# Patient Record
Sex: FEMALE | Race: WHITE | Employment: UNEMPLOYED | URBAN - NONMETROPOLITAN AREA
[De-identification: names, ages, dates, MRNs, and addresses within clinical notes are randomized per-mention and may not be internally consistent; named-entity substitution may affect disease eponyms.]

---

## 2018-04-07 ENCOUNTER — APPOINTMENT (OUTPATIENT)
Dept: GENERAL RADIOLOGY | Age: 28
End: 2018-04-07
Payer: MEDICAID

## 2018-04-07 ENCOUNTER — HOSPITAL ENCOUNTER (EMERGENCY)
Age: 28
Discharge: HOME | End: 2018-04-07
Payer: MEDICAID

## 2018-04-07 VITALS
OXYGEN SATURATION: 98 % | RESPIRATION RATE: 18 BRPM | HEIGHT: 68 IN | HEART RATE: 93 BPM | DIASTOLIC BLOOD PRESSURE: 100 MMHG | BODY MASS INDEX: 44.41 KG/M2 | TEMPERATURE: 98.4 F | WEIGHT: 293 LBS | SYSTOLIC BLOOD PRESSURE: 150 MMHG

## 2018-04-07 DIAGNOSIS — S83.91XA SPRAIN OF RIGHT KNEE, UNSPECIFIED LIGAMENT, INITIAL ENCOUNTER: Primary | ICD-10-CM

## 2018-04-07 PROCEDURE — 99283 EMERGENCY DEPT VISIT LOW MDM: CPT

## 2018-04-07 PROCEDURE — 73562 X-RAY EXAM OF KNEE 3: CPT

## 2018-04-07 PROCEDURE — 99283 EMERGENCY DEPT VISIT LOW MDM: CPT | Performed by: NURSE PRACTITIONER

## 2018-04-07 RX ORDER — NAPROXEN 500 MG/1
500 TABLET ORAL 2 TIMES DAILY WITH MEALS
Qty: 60 TABLET | Refills: 0 | Status: SHIPPED | OUTPATIENT
Start: 2018-04-07

## 2018-04-07 ASSESSMENT — PAIN DESCRIPTION - LOCATION: LOCATION: KNEE

## 2018-04-07 ASSESSMENT — PAIN SCALES - GENERAL: PAINLEVEL_OUTOF10: 5

## 2018-04-07 ASSESSMENT — PAIN DESCRIPTION - ORIENTATION: ORIENTATION: RIGHT

## 2018-05-02 ENCOUNTER — OFFICE VISIT (OUTPATIENT)
Dept: BARIATRICS/WEIGHT MGMT | Facility: HOSPITAL | Age: 28
End: 2018-05-02

## 2018-05-02 ENCOUNTER — OFFICE VISIT (OUTPATIENT)
Dept: BARIATRICS/WEIGHT MGMT | Facility: CLINIC | Age: 28
End: 2018-05-02

## 2018-05-02 VITALS
WEIGHT: 293 LBS | BODY MASS INDEX: 45.99 KG/M2 | TEMPERATURE: 96.8 F | HEART RATE: 95 BPM | SYSTOLIC BLOOD PRESSURE: 156 MMHG | DIASTOLIC BLOOD PRESSURE: 100 MMHG | OXYGEN SATURATION: 98 % | HEIGHT: 67 IN

## 2018-05-02 DIAGNOSIS — R06.2 WHEEZING: ICD-10-CM

## 2018-05-02 DIAGNOSIS — R53.83 FATIGUE, UNSPECIFIED TYPE: ICD-10-CM

## 2018-05-02 DIAGNOSIS — F17.200 SMOKER: ICD-10-CM

## 2018-05-02 DIAGNOSIS — Z13.220 SCREENING FOR LIPID DISORDERS: ICD-10-CM

## 2018-05-02 DIAGNOSIS — R63.5 WEIGHT GAIN: ICD-10-CM

## 2018-05-02 DIAGNOSIS — Z76.89 ESTABLISHING CARE WITH NEW DOCTOR, ENCOUNTER FOR: ICD-10-CM

## 2018-05-02 DIAGNOSIS — E66.01 OBESITY, MORBID, BMI 50 OR HIGHER (HCC): Primary | ICD-10-CM

## 2018-05-02 DIAGNOSIS — R03.0 BLOOD PRESSURE ELEVATED WITHOUT HISTORY OF HTN: ICD-10-CM

## 2018-05-02 PROCEDURE — 97802 MEDICAL NUTRITION INDIV IN: CPT

## 2018-05-02 PROCEDURE — 99204 OFFICE O/P NEW MOD 45 MIN: CPT | Performed by: NURSE PRACTITIONER

## 2018-05-02 RX ORDER — ALBUTEROL SULFATE 90 UG/1
2 AEROSOL, METERED RESPIRATORY (INHALATION) EVERY 4 HOURS PRN
Qty: 1 INHALER | Refills: 0 | Status: SHIPPED | OUTPATIENT
Start: 2018-05-02 | End: 2018-07-13 | Stop reason: SDUPTHER

## 2018-05-02 RX ORDER — NAPROXEN 500 MG/1
500 TABLET ORAL
COMMUNITY
Start: 2018-04-07 | End: 2018-05-02

## 2018-05-02 RX ORDER — RANITIDINE HCL 75 MG
75 TABLET ORAL 2 TIMES DAILY
COMMUNITY
End: 2018-05-14 | Stop reason: SDUPTHER

## 2018-05-02 NOTE — PATIENT INSTRUCTIONS
Magda Palm is a 27 y.o. who has a history of morbid obesity and desires medically supervised weight loss. Baseline labs will be ordered today. Patient may be interested in some of the weight loss medications (prescription drug management) at the next visit such as: metformin, topamax, wellbutrin.  Patient will see the dietician today for make specific goals for diet, exercise, and lifestyle. Patient has received intensive behavioral therapy for obesity today. A referral has been made today to get established with Dr. Falcon as new PCP. Office will arrange. You may use the inhaler as needed and directed for wheezing. Smoking cessation has been discussed and encouraged to improve overall health and well being. Patient may want bariatric surgery in the future, but would like to try medically weight loss first.I will have them follow up in one month for a weight recheck and to further discuss options.

## 2018-05-02 NOTE — PROGRESS NOTES
"Subjective   Magda Palm is a 27 y.o. female.     History of Present Illness   Magda Palm is a 27 y.o. year-old who has morbid obesity and is interested in having medically supervised weight loss versus surgical weight loss. Patient has been overweight for the past 5 years. The most weight ever lost was 5 pounds from using low sugar diet and walking. She has been able to keep the weight off for about 2 months. Unsupervised diet attempts include: low fat, fasting, calorie counting, and low carb diet. Supervised diet attempts include: none.  Patient has tried the following OTC or prescription medications for weight loss: garcinia cambogia. Patient states she tends to eat due to physical hunger, anxiety, and loneliness. Patient is limited in activities due to: knee pain.   Vitals:    05/02/18 1337   BP: 156/100   BP Location: Right arm   Patient Position: Sitting   Cuff Size: Adult   Pulse: 95   Temp: 96.8 °F (36 °C)   SpO2: 98%   Weight: (!) 153 kg (336 lb 9.6 oz)   Height: 168.9 cm (66.5\")     She  has a past medical history of ADD (attention deficit disorder); Anxiety; Constipation; Diarrhea; Heart burn; Insomnia; and Pain.  She  does not have a problem list on file.  She  has a past surgical history that includes Cholecystectomy and Floyd tooth extraction.  Her family history includes Heart disease in her maternal grandmother; No Known Problems in her father and mother.  She  reports that she has been smoking.  She started smoking about 16 years ago. She has a 10.00 pack-year smoking history. She has never used smokeless tobacco. She reports that she drinks alcohol. She reports that she does not use drugs.  Current Outpatient Prescriptions   Medication Sig Dispense Refill   • raNITIdine (ZANTAC) 75 MG tablet Take 75 mg by mouth 2 (Two) Times a Day.     • albuterol (PROVENTIL HFA;VENTOLIN HFA) 108 (90 Base) MCG/ACT inhaler Inhale 2 puffs Every 4 (Four) Hours As Needed for Wheezing. 1 inhaler 0     No " current facility-administered medications for this visit.      She is allergic to penicillins.      The following portions of the patient's history were reviewed and updated as appropriate: allergies, current medications, past family history, past medical history, past social history, past surgical history and problem list.    Review of Systems   Constitutional: Positive for fatigue. Negative for activity change and appetite change.   HENT: Positive for postnasal drip.    Eyes: Negative.         Wears corrective lenses   Respiratory: Positive for cough. Negative for apnea, chest tightness and shortness of breath.    Cardiovascular: Negative.  Negative for chest pain, palpitations and leg swelling.   Gastrointestinal: Positive for constipation and diarrhea. Negative for abdominal pain, anal bleeding, nausea and vomiting.        Heartburn   Endocrine: Negative.         Night sweats; abnormal facial hair   Genitourinary: Positive for menstrual problem.   Musculoskeletal: Positive for arthralgias and back pain.   Skin: Negative.    Allergic/Immunologic: Negative.    Neurological: Negative.  Negative for seizures and syncope.   Hematological: Negative.  Does not bruise/bleed easily.   Psychiatric/Behavioral: Positive for sleep disturbance. Negative for dysphoric mood and self-injury.       Objective   Physical Exam   Constitutional: She is oriented to person, place, and time. Vital signs are normal. She appears well-developed and well-nourished. She is cooperative. No distress.   HENT:   Head: Normocephalic and atraumatic.   Nose: Nose normal.   Mouth/Throat: Oropharynx is clear and moist. No oropharyngeal exudate or tonsillar abscesses.   Eyes: Conjunctivae, EOM and lids are normal. Pupils are equal, round, and reactive to light. Right eye exhibits no discharge. Left eye exhibits no discharge.   Neck: Trachea normal. Neck supple. No JVD present. Carotid bruit is not present. No no neck rigidity. No tracheal deviation  present. No thyromegaly present.   Cardiovascular: Normal rate, regular rhythm, S1 normal, S2 normal and normal heart sounds.    Pulmonary/Chest: Effort normal. No stridor. No respiratory distress. She has wheezes in the right upper field, the right middle field, the right lower field, the left upper field, the left middle field and the left lower field. She has no rales.   Abdominal: Soft. Bowel sounds are normal. She exhibits no distension. There is no tenderness.   obese   Musculoskeletal: She exhibits no edema.        Right shoulder: She exhibits normal strength.   Lymphadenopathy:     She has no cervical adenopathy.   Neurological: She is alert and oriented to person, place, and time. She has normal strength. No cranial nerve deficit.   Skin: Skin is warm, dry and intact. No rash noted.   Psychiatric: She has a normal mood and affect. Her speech is normal and behavior is normal.   Alert and oriented x 3   Vitals reviewed.      Assessment/Plan   Magda was seen today for consult, obesity, weight loss and nutrition counseling.    Diagnoses and all orders for this visit:    Obesity, morbid, BMI 50 or higher  -     Bariatric Nutritional Counseling; Standing  -     CBC (No Diff); Future  -     Comprehensive Metabolic Panel; Future  -     Lipid Panel; Future  -     TSH; Future  -     Vitamin B12; Future  -     Vitamin D 25 Hydroxy; Future  -     Cancel: Ambulatory Referral to Internal Medicine  -     Ambulatory Referral to Internal Medicine    Smoker  -     Cancel: Ambulatory Referral to Internal Medicine  -     Ambulatory Referral to Internal Medicine    Blood pressure elevated without history of HTN  -     Cancel: Ambulatory Referral to Internal Medicine  -     Ambulatory Referral to Internal Medicine    Wheezing  -     Cancel: Ambulatory Referral to Internal Medicine  -     Ambulatory Referral to Internal Medicine    Weight gain  -     CBC (No Diff); Future  -     Comprehensive Metabolic Panel; Future  -     TSH;  Future  -     Vitamin B12; Future  -     Vitamin D 25 Hydroxy; Future    Fatigue, unspecified type  -     CBC (No Diff); Future  -     Comprehensive Metabolic Panel; Future  -     TSH; Future  -     Vitamin B12; Future  -     Vitamin D 25 Hydroxy; Future    Establishing care with new doctor, encounter for  -     Cancel: Ambulatory Referral to Internal Medicine  -     Ambulatory Referral to Internal Medicine    Screening for lipid disorders  -     Lipid Panel; Future    Other orders  -     albuterol (PROVENTIL HFA;VENTOLIN HFA) 108 (90 Base) MCG/ACT inhaler; Inhale 2 puffs Every 4 (Four) Hours As Needed for Wheezing.          Magda Palm is a 27 y.o. who has a history of morbid obesity and desires medically supervised weight loss. Baseline labs will be ordered today. Patient may be interested in some of the weight loss medications (prescription drug management) at the next visit such as: metformin, topamax, wellbutrin.  Patient will see the dietician today for make specific goals for diet, exercise, and lifestyle. Patient has received intensive behavioral therapy for obesity today. A referral has been made today to get established with Dr. Falcon as new PCP. Office will arrange. You may use the inhaler as needed and directed for wheezing. Smoking cessation has been discussed and encouraged to improve overall health and well being. Patient may want bariatric surgery in the future, but would like to try medically weight loss first.I will have them follow up in one month for a weight recheck and to further discuss options.

## 2018-05-02 NOTE — PROGRESS NOTES
"Nutrition Bariatric/[facility] Note     Visit   Initial Assessment     Anthropometrics   Height: 66.5\"  Weight: 336.5#  BMI: 53.5    Waist: 59\"  Hip: 60\"  Chest: 56\"  Thigh: 31\"  Arm: 19\"  Body Fat: 49.7%    Nutrition Recall  24 Hour recall:  (B) skips meal (L) skips meal (D) salad w/chicken/egg/cheese OR bagel, water  Eating 1 meals daily   Protein lacking at some meals  Snacking: hard boiled eggs  Limited sweet intake.  Decreased during the past 3 months  Large portions  Drinking with meals   Decreased to drinking 1-2 carbonated beverages per week.  Drinking greater to or equal to 64 fluid ounces    Exercise   Walkin minutes daily.  Began during past 3 months.    Habits:  Smoking    Education    Goal Setting and Information Packet    Nutrition Goals   Continue diet changes  Eat 3-4 meals per day with protein  Eat protein first at meals  Eliminate snacks  Healthier food choices  Portion control / Use smaller plate or measuring cup   Eliminate soda    Exercise Goals  Continue current exercise routine     Marcia Castellano RD, LD  2018  1:48 PM    "

## 2018-05-03 ENCOUNTER — TELEPHONE (OUTPATIENT)
Dept: BARIATRICS/WEIGHT MGMT | Facility: CLINIC | Age: 28
End: 2018-05-03

## 2018-05-03 ENCOUNTER — LAB (OUTPATIENT)
Dept: LAB | Facility: HOSPITAL | Age: 28
End: 2018-05-03
Attending: NURSE PRACTITIONER

## 2018-05-03 DIAGNOSIS — E66.01 OBESITY, MORBID, BMI 50 OR HIGHER (HCC): ICD-10-CM

## 2018-05-03 DIAGNOSIS — R53.83 FATIGUE, UNSPECIFIED TYPE: ICD-10-CM

## 2018-05-03 DIAGNOSIS — Z13.220 SCREENING FOR LIPID DISORDERS: ICD-10-CM

## 2018-05-03 DIAGNOSIS — R63.5 WEIGHT GAIN: ICD-10-CM

## 2018-05-03 LAB
25(OH)D3 SERPL-MCNC: 19.2 NG/ML (ref 30–100)
ALBUMIN SERPL-MCNC: 3.8 G/DL (ref 3.5–5)
ALBUMIN/GLOB SERPL: 1.2 G/DL (ref 1.1–2.5)
ALP SERPL-CCNC: 60 U/L (ref 24–120)
ALT SERPL W P-5'-P-CCNC: 36 U/L (ref 0–54)
ANION GAP SERPL CALCULATED.3IONS-SCNC: 9 MMOL/L (ref 4–13)
ARTICHOKE IGE QN: 138 MG/DL (ref 0–99)
AST SERPL-CCNC: 28 U/L (ref 7–45)
BILIRUB SERPL-MCNC: 0.5 MG/DL (ref 0.1–1)
BUN BLD-MCNC: 14 MG/DL (ref 5–21)
BUN/CREAT SERPL: 20 (ref 7–25)
CALCIUM SPEC-SCNC: 9.1 MG/DL (ref 8.4–10.4)
CHLORIDE SERPL-SCNC: 109 MMOL/L (ref 98–110)
CHOLEST SERPL-MCNC: 200 MG/DL (ref 130–200)
CO2 SERPL-SCNC: 25 MMOL/L (ref 24–31)
CREAT BLD-MCNC: 0.7 MG/DL (ref 0.5–1.4)
DEPRECATED RDW RBC AUTO: 38.4 FL (ref 40–54)
ERYTHROCYTE [DISTWIDTH] IN BLOOD BY AUTOMATED COUNT: 12.7 % (ref 12–15)
GFR SERPL CREATININE-BSD FRML MDRD: 100 ML/MIN/1.73
GLOBULIN UR ELPH-MCNC: 3.1 GM/DL
GLUCOSE BLD-MCNC: 98 MG/DL (ref 70–100)
HCT VFR BLD AUTO: 43.4 % (ref 37–47)
HDLC SERPL-MCNC: 29 MG/DL
HGB BLD-MCNC: 14.6 G/DL (ref 12–16)
LDLC/HDLC SERPL: 4.01 {RATIO}
MCH RBC QN AUTO: 27.8 PG (ref 28–32)
MCHC RBC AUTO-ENTMCNC: 33.6 G/DL (ref 33–36)
MCV RBC AUTO: 82.5 FL (ref 82–98)
PLATELET # BLD AUTO: 373 10*3/MM3 (ref 130–400)
PMV BLD AUTO: 9.8 FL (ref 6–12)
POTASSIUM BLD-SCNC: 4 MMOL/L (ref 3.5–5.3)
PROT SERPL-MCNC: 6.9 G/DL (ref 6.3–8.7)
RBC # BLD AUTO: 5.26 10*6/MM3 (ref 4.2–5.4)
SODIUM BLD-SCNC: 143 MMOL/L (ref 135–145)
TRIGL SERPL-MCNC: 274 MG/DL (ref 0–149)
TSH SERPL DL<=0.05 MIU/L-ACNC: 6.01 MIU/ML (ref 0.47–4.68)
VIT B12 BLD-MCNC: 471 PG/ML (ref 239–931)
WBC NRBC COR # BLD: 9.57 10*3/MM3 (ref 4.8–10.8)

## 2018-05-03 PROCEDURE — 82306 VITAMIN D 25 HYDROXY: CPT | Performed by: NURSE PRACTITIONER

## 2018-05-03 PROCEDURE — 36415 COLL VENOUS BLD VENIPUNCTURE: CPT

## 2018-05-03 PROCEDURE — 80061 LIPID PANEL: CPT | Performed by: NURSE PRACTITIONER

## 2018-05-03 PROCEDURE — 82607 VITAMIN B-12: CPT | Performed by: NURSE PRACTITIONER

## 2018-05-03 PROCEDURE — 80050 GENERAL HEALTH PANEL: CPT | Performed by: NURSE PRACTITIONER

## 2018-05-03 RX ORDER — ERGOCALCIFEROL 1.25 MG/1
50000 CAPSULE ORAL WEEKLY
Qty: 4 CAPSULE | Refills: 2 | Status: SHIPPED | OUTPATIENT
Start: 2018-05-03 | End: 2018-08-27 | Stop reason: ALTCHOICE

## 2018-05-03 NOTE — TELEPHONE ENCOUNTER
TSH is slightly elevated.  We will have patient follow up with Dr. Falcon or his nurse practitioner at her first visit with them.  They might want to just recheck her level in about 6 weeks.  Vitamin D level is low.  We will send in prescription for vitamin D to be taken 50,000 units once a week.  On the other 6 days of the week she can take vitamin D3 2000 units daily. She can purchase that over the counter. We will recheck level in 3 months. Triglycerides are elevated. Diet, exercise, and weight loss will help improve this number. Specifically, reducing starches, sweets, and alcohol (if applicable) in her diet. Attempted to call patient, but no answer and no option to leave voice message. Please, send her a letter.

## 2018-05-14 ENCOUNTER — OFFICE VISIT (OUTPATIENT)
Dept: INTERNAL MEDICINE | Facility: CLINIC | Age: 28
End: 2018-05-14

## 2018-05-14 VITALS
OXYGEN SATURATION: 96 % | TEMPERATURE: 98.1 F | SYSTOLIC BLOOD PRESSURE: 164 MMHG | HEIGHT: 67 IN | RESPIRATION RATE: 16 BRPM | HEART RATE: 79 BPM | DIASTOLIC BLOOD PRESSURE: 108 MMHG | BODY MASS INDEX: 45.99 KG/M2 | WEIGHT: 293 LBS

## 2018-05-14 DIAGNOSIS — N92.6 IRREGULAR MENSTRUAL BLEEDING: ICD-10-CM

## 2018-05-14 DIAGNOSIS — Z23 NEED FOR TETANUS BOOSTER: ICD-10-CM

## 2018-05-14 DIAGNOSIS — K21.9 GASTROESOPHAGEAL REFLUX DISEASE WITHOUT ESOPHAGITIS: ICD-10-CM

## 2018-05-14 DIAGNOSIS — E03.8 OTHER SPECIFIED HYPOTHYROIDISM: ICD-10-CM

## 2018-05-14 DIAGNOSIS — I10 ESSENTIAL HYPERTENSION: Primary | ICD-10-CM

## 2018-05-14 DIAGNOSIS — IMO0001 CLASS 3 OBESITY DUE TO EXCESS CALORIES WITHOUT SERIOUS COMORBIDITY WITH BODY MASS INDEX (BMI) OF 50.0 TO 59.9 IN ADULT: ICD-10-CM

## 2018-05-14 DIAGNOSIS — Z71.6 ENCOUNTER FOR SMOKING CESSATION COUNSELING: ICD-10-CM

## 2018-05-14 DIAGNOSIS — F33.41 RECURRENT MAJOR DEPRESSIVE DISORDER, IN PARTIAL REMISSION (HCC): ICD-10-CM

## 2018-05-14 DIAGNOSIS — F17.210 CIGARETTE SMOKER: ICD-10-CM

## 2018-05-14 PROCEDURE — 99215 OFFICE O/P EST HI 40 MIN: CPT | Performed by: NURSE PRACTITIONER

## 2018-05-14 PROCEDURE — 90471 IMMUNIZATION ADMIN: CPT | Performed by: NURSE PRACTITIONER

## 2018-05-14 PROCEDURE — 90715 TDAP VACCINE 7 YRS/> IM: CPT | Performed by: NURSE PRACTITIONER

## 2018-05-14 PROCEDURE — 99406 BEHAV CHNG SMOKING 3-10 MIN: CPT | Performed by: NURSE PRACTITIONER

## 2018-05-14 RX ORDER — LEVOTHYROXINE SODIUM 0.2 MG/1
200 TABLET ORAL DAILY
Qty: 30 TABLET | Refills: 6 | Status: SHIPPED | OUTPATIENT
Start: 2018-05-14 | End: 2019-03-28 | Stop reason: SDUPTHER

## 2018-05-14 RX ORDER — RANITIDINE HCL 75 MG
75 TABLET ORAL NIGHTLY
Qty: 30 TABLET | Refills: 6 | Status: SHIPPED | OUTPATIENT
Start: 2018-05-14 | End: 2018-10-29

## 2018-05-14 RX ORDER — BUPROPION HYDROCHLORIDE 75 MG/1
75 TABLET ORAL 2 TIMES DAILY
Qty: 60 TABLET | Refills: 6 | Status: SHIPPED | OUTPATIENT
Start: 2018-05-14 | End: 2018-06-14

## 2018-05-14 RX ORDER — LISINOPRIL 10 MG/1
20 TABLET ORAL DAILY
Qty: 30 TABLET | Refills: 6 | Status: SHIPPED | OUTPATIENT
Start: 2018-05-14 | End: 2018-05-15 | Stop reason: SDUPTHER

## 2018-05-14 RX ORDER — PANTOPRAZOLE SODIUM 40 MG/1
40 TABLET, DELAYED RELEASE ORAL DAILY
Qty: 30 TABLET | Refills: 6 | Status: SHIPPED | OUTPATIENT
Start: 2018-05-14 | End: 2019-01-03 | Stop reason: SDUPTHER

## 2018-05-14 NOTE — PROGRESS NOTES
CC: follow up on thyroid, elevated blood pressure, establish care with PCP.    History:  Magda Palm is a 27 y.o. female who presents today for evaluation of the above problems.    She presents today to establish care with primary care physician.  She is seeing bariatric surgery for weight loss.  She did have labs done by their office and was found to have an elevated TSH level.  She was also found to have elevated blood pressure at that visit.  She has elevated blood pressure level today as well.  She reports that she has never taken blood pressure medicine in the past.  Patient also complains of worsening heartburn.  She does take Zantac nightly.  This previously did help heartburn but recently has not been keeping it controlled.  Patient is a current cigarette smoker.  She would like to quit.  She has tried patches and gums in the past with no success.  Never tried Wellbutrin before for smoking cessation.  Patient does have a history of mood disorder and depression.  She is not currently being treated for mental health.  She tried Lamictal in the past with no success.  She reports that she does have bipolar tendencies.   Patient does report irregular periods.  At times she will go months between periods.  Patient is due for pap. Declines at this time.     ROS:  Review of Systems   Constitutional: Negative for fatigue and unexpected weight change.   HENT: Negative.         Heartburn   Eyes: Negative.    Respiratory: Negative.    Cardiovascular: Negative.    Gastrointestinal: Positive for diarrhea. Negative for abdominal pain and constipation.   Endocrine: Negative.    Genitourinary: Positive for menstrual problem. Negative for difficulty urinating, dyspareunia, genital sores, pelvic pain, vaginal bleeding, vaginal discharge and vaginal pain.   Musculoskeletal: Negative.    Skin: Negative.    Neurological: Negative.    Psychiatric/Behavioral: Negative.        Allergies   Allergen Reactions   • Penicillins Hives  "    Past Medical History:   Diagnosis Date   • ADD (attention deficit disorder)    • Anxiety    • Constipation    • Depression    • Diarrhea    • Essential hypertension 5/14/2018   • Heart burn    • Insomnia    • Pain     shoulder, knee, back     Past Surgical History:   Procedure Laterality Date   • CHOLECYSTECTOMY      lap   • WISDOM TOOTH EXTRACTION       Family History   Problem Relation Age of Onset   • No Known Problems Mother    • No Known Problems Father    • Heart disease Maternal Grandmother    • Cancer Maternal Grandmother    • Stroke Maternal Grandmother    • Diabetes Maternal Grandmother       reports that she has been smoking.  She started smoking about 16 years ago. She has a 10.00 pack-year smoking history. She has never used smokeless tobacco. She reports that she uses drugs, including Marijuana. She reports that she does not drink alcohol.      Current Outpatient Prescriptions:   •  albuterol (PROVENTIL HFA;VENTOLIN HFA) 108 (90 Base) MCG/ACT inhaler, Inhale 2 puffs Every 4 (Four) Hours As Needed for Wheezing., Disp: 1 inhaler, Rfl: 0  •  Aspirin-Acetaminophen-Caffeine (EXCEDRIN PO), Take 2 tablets by mouth As Needed (for headaches and migraines)., Disp: , Rfl:   •  ergocalciferol (ERGOCALCIFEROL) 80359 units capsule, Take 1 capsule by mouth 1 (One) Time Per Week., Disp: 4 capsule, Rfl: 2  •  raNITIdine (ZANTAC) 75 MG tablet, Take 75 mg by mouth 2 (Two) Times a Day., Disp: , Rfl:     OBJECTIVE:  BP (!) 164/108 (BP Location: Left arm, Patient Position: Sitting, Cuff Size: Adult)   Pulse 79   Temp 98.1 °F (36.7 °C) (Oral)   Resp 16   Ht 168.9 cm (66.5\")   Wt (!) 152 kg (335 lb 6.4 oz)   SpO2 96%   BMI 53.32 kg/m²    Physical Exam   Constitutional: She is oriented to person, place, and time. She appears well-developed and well-nourished.   HENT:   Head: Normocephalic and atraumatic.   Eyes: EOM are normal. Pupils are equal, round, and reactive to light.   Neck: Normal range of motion. Neck " supple.   Cardiovascular: Normal rate, regular rhythm and normal heart sounds.    Pulmonary/Chest: Effort normal and breath sounds normal.   Abdominal: Soft. Bowel sounds are normal.   Musculoskeletal: Normal range of motion.   Neurological: She is alert and oriented to person, place, and time.   Skin: Skin is warm and dry.   Psychiatric: She has a normal mood and affect.   Vitals reviewed.      Assessment/Plan    Diagnoses and all orders for this visit:    Essential hypertension  -     lisinopril (PRINIVIL,ZESTRIL) 10 MG tablet; Take 2 tablets by mouth Daily.  Patient to keep a blood pressure log.  Goal is a blood pressure below 140/90.  We will follow-up for blood pressure check in one month.    Class 3 obesity due to excess calories without serious comorbidity with body mass index (BMI) of 50.0 to 59.9 in adult  Continue appointments with bariatric surgery.  We will start Wellbutrin for depression and smoking cessation this may also assist with make weight loss.    Gastroesophageal reflux disease without esophagitis  -     pantoprazole (PROTONIX) 40 MG EC tablet; Take 1 tablet by mouth Daily.  -     raNITIdine (ZANTAC) 75 MG tablet; Take 1 tablet by mouth Every Night.  Education provided for diet changes and discharge summary.    Cigarette smoker  -     pneumococcal polysaccharide 23-valent (PNEUMOVAX-23) vaccine 0.5 mL; Inject 0.5 mL into the shoulder, thigh, or buttocks 1 (One) Time.    Other specified hypothyroidism  -     levothyroxine (SYNTHROID) 200 MCG tablet; Take 1 tablet by mouth Daily.  -     TSH; Future  -     T4, free; Future  -     T3, free; Future  We will recheck thyroid labs in 6 weeks.    Recurrent major depressive disorder, in partial remission  -     buPROPion (WELLBUTRIN) 75 MG tablet; Take 1 tablet by mouth 2 (Two) Times a Day.    Irregular menstrual bleeding  Advised that thyroid disorder can also cause menstrual irregularity.  As well as being overweight.    Need for tetanus booster  -      Tdap Vaccine Greater Than or Equal To 6yo IM    Encounter for smoking cessation counseling  -     buPROPion (WELLBUTRIN) 75 MG tablet; Take 1 tablet by mouth 2 (Two) Times a Day.  I advised the patient of the risks in continuing to use tobacco, and I provided this patient with smoking cessation educational materials.    During this visit, I spent 8 minutes counseling the patient regarding smoking cessation.      An After Visit Summary was printed and given to the patient at discharge.  Return in about 1 month (around 6/14/2018) for b/p check.         Vangie Monique APRN 5/14/2018

## 2018-05-15 DIAGNOSIS — I10 ESSENTIAL HYPERTENSION: ICD-10-CM

## 2018-05-15 RX ORDER — LISINOPRIL 20 MG/1
20 TABLET ORAL DAILY
Qty: 30 TABLET | Refills: 5 | Status: SHIPPED | OUTPATIENT
Start: 2018-05-15 | End: 2018-06-14 | Stop reason: SINTOL

## 2018-05-15 RX ORDER — LISINOPRIL 20 MG/1
20 TABLET ORAL DAILY
Qty: 30 TABLET | Refills: 5 | Status: CANCELLED | OUTPATIENT
Start: 2018-05-15

## 2018-05-25 ENCOUNTER — RESULTS ENCOUNTER (OUTPATIENT)
Dept: BARIATRICS/WEIGHT MGMT | Facility: CLINIC | Age: 28
End: 2018-05-25

## 2018-05-25 DIAGNOSIS — E66.01 OBESITY, MORBID, BMI 50 OR HIGHER (HCC): ICD-10-CM

## 2018-06-04 ENCOUNTER — OFFICE VISIT (OUTPATIENT)
Dept: BARIATRICS/WEIGHT MGMT | Facility: CLINIC | Age: 28
End: 2018-06-04

## 2018-06-04 VITALS
HEART RATE: 97 BPM | BODY MASS INDEX: 45.99 KG/M2 | WEIGHT: 293 LBS | DIASTOLIC BLOOD PRESSURE: 85 MMHG | TEMPERATURE: 98.2 F | HEIGHT: 67 IN | SYSTOLIC BLOOD PRESSURE: 125 MMHG | OXYGEN SATURATION: 97 %

## 2018-06-04 DIAGNOSIS — F17.210 CIGARETTE SMOKER: Primary | ICD-10-CM

## 2018-06-04 DIAGNOSIS — K21.9 GASTROESOPHAGEAL REFLUX DISEASE WITHOUT ESOPHAGITIS: ICD-10-CM

## 2018-06-04 PROCEDURE — 99213 OFFICE O/P EST LOW 20 MIN: CPT | Performed by: SURGERY

## 2018-06-04 PROCEDURE — 99406 BEHAV CHNG SMOKING 3-10 MIN: CPT | Performed by: SURGERY

## 2018-06-04 NOTE — PROGRESS NOTES
Patient Care Team:  ANA LILIA Sorto as PCP - General (Family Medicine)    Reason for Visit:  Surgical Weight loss    Subjective     Patient is a 27 y.o. female presents with morbid obesity and her Body mass index is 53.42 kg/m².     She is here for discussion of surgical weight loss options.  She stated she has been with the disease of obesity for year(s).  She stated she suffers from joint pain, heartburn and hypertension due to her weight gain.  She stated that weight loss helps alleviate these symptoms.   She stated that she has tried changing her eating fair and stopping the use of sodas as well as exercise to help with weight loss.  She stated that she has attempted these conservative methods for weight loss without maintaining long term success.  Today she would like to discuss surgical weight loss options such as the Laparoscopic Sleeve Gastrectomy or the Laparoscopic R - Y Gastric Bypass.     Review of Systems  General ROS: positive for  - sleep disturbance  Respiratory ROS: positive for - cough  negative for - hemoptysis, shortness of breath, tachypnea or wheezing  Cardiovascular ROS: no chest pain or dyspnea on exertion  Gastrointestinal ROS: no abdominal pain, change in bowel habits, or black or bloody stools  Musculoskeletal ROS: positive for - joint pain    History  Past Medical History:   Diagnosis Date   • ADD (attention deficit disorder)    • Anxiety    • Constipation    • Depression    • Diarrhea    • Essential hypertension 5/14/2018   • Heart burn    • Insomnia    • Pain     shoulder, knee, back     Past Surgical History:   Procedure Laterality Date   • CHOLECYSTECTOMY      lap   • WISDOM TOOTH EXTRACTION       Family History   Problem Relation Age of Onset   • No Known Problems Mother    • No Known Problems Father    • Heart disease Maternal Grandmother    • Cancer Maternal Grandmother    • Stroke Maternal Grandmother    • Diabetes Maternal Grandmother      Social History   Substance Use  Topics   • Smoking status: Current Every Day Smoker     Packs/day: 0.50     Years: 10.00     Start date: 5/2/2002   • Smokeless tobacco: Never Used      Comment: it is hard, stresses her out   • Alcohol use No       (Not in a hospital admission)  Allergies:  Penicillins      Current Outpatient Prescriptions:   •  albuterol (PROVENTIL HFA;VENTOLIN HFA) 108 (90 Base) MCG/ACT inhaler, Inhale 2 puffs Every 4 (Four) Hours As Needed for Wheezing., Disp: 1 inhaler, Rfl: 0  •  Aspirin-Acetaminophen-Caffeine (EXCEDRIN PO), Take 2 tablets by mouth As Needed (for headaches and migraines)., Disp: , Rfl:   •  buPROPion (WELLBUTRIN) 75 MG tablet, Take 1 tablet by mouth 2 (Two) Times a Day., Disp: 60 tablet, Rfl: 6  •  ergocalciferol (ERGOCALCIFEROL) 32863 units capsule, Take 1 capsule by mouth 1 (One) Time Per Week., Disp: 4 capsule, Rfl: 2  •  levothyroxine (SYNTHROID) 200 MCG tablet, Take 1 tablet by mouth Daily., Disp: 30 tablet, Rfl: 6  •  lisinopril (PRINIVIL,ZESTRIL) 20 MG tablet, Take 1 tablet by mouth Daily., Disp: 30 tablet, Rfl: 5  •  pantoprazole (PROTONIX) 40 MG EC tablet, Take 1 tablet by mouth Daily., Disp: 30 tablet, Rfl: 6  •  raNITIdine (ZANTAC) 75 MG tablet, Take 1 tablet by mouth Every Night. (Patient taking differently: Take 75 mg by mouth As Needed.), Disp: 30 tablet, Rfl: 6    Objective     Vital Signs  Temp:  [98.2 °F (36.8 °C)] 98.2 °F (36.8 °C)  Heart Rate:  [97] 97  BP: (125)/(85) 125/85  Body mass index is 53.42 kg/m².  1    06/04/18  0904   Weight: (!) 152 kg (336 lb)       Physical Exam:     HEENT: extra ocular movement intact   Neurologic: alert, oriented, normal speech, no focal findings or movement disorder noted       Results Review:   None        Assessment/Plan   Encounter Diagnoses   Name Primary?   • Cigarette smoker Yes   • Gastroesophageal reflux disease without esophagitis        A total of 15 minutes was spent face-to-face with this patient during the encounter and over half of that time  was spent on counseling and coordination of care of her disease of obesity.  The patient and I discussed the causes of her obesity and conservative as well as surgical methods to help reverse this comorbid condition.  I encouraged her to continue to eat 3 meals a day and continue to measure her meals.  She is consuming 64 ounces of fluid daily and exercising for 30 minutes twice a week.  I have explained that it is necessary for her consideration for surgery to stop the use of nicotine products and marijuana.  She is attempting to stop smoking and is currently on Wellbutrin to help facilitate this.  She states that the Wellbutrin has not been effective and is following up with her primary care provider.  Of note approximately 5 minutes was spent face-to-face with this patient during this encounter on counseling and coordination of nicotine cessation.  I provided education and recommendations for nicotine cessation.  She is to follow-up with our program in 1 month's time.    I discussed the patients findings and my recommendations with patient.     Dr. Benny Rueda MD [facility]    06/04/18  9:37 AM  Patient Care Team:  ANA LILIA Sorto as PCP - General (Family Medicine)

## 2018-06-14 ENCOUNTER — OFFICE VISIT (OUTPATIENT)
Dept: INTERNAL MEDICINE | Facility: CLINIC | Age: 28
End: 2018-06-14

## 2018-06-14 VITALS
HEIGHT: 67 IN | RESPIRATION RATE: 12 BRPM | WEIGHT: 293 LBS | BODY MASS INDEX: 45.99 KG/M2 | DIASTOLIC BLOOD PRESSURE: 90 MMHG | OXYGEN SATURATION: 98 % | HEART RATE: 88 BPM | TEMPERATURE: 98.2 F | SYSTOLIC BLOOD PRESSURE: 128 MMHG

## 2018-06-14 DIAGNOSIS — F33.1 MODERATE EPISODE OF RECURRENT MAJOR DEPRESSIVE DISORDER (HCC): ICD-10-CM

## 2018-06-14 DIAGNOSIS — Z71.6 ENCOUNTER FOR SMOKING CESSATION COUNSELING: ICD-10-CM

## 2018-06-14 DIAGNOSIS — F17.210 CIGARETTE SMOKER: ICD-10-CM

## 2018-06-14 DIAGNOSIS — I10 ESSENTIAL HYPERTENSION: Primary | ICD-10-CM

## 2018-06-14 DIAGNOSIS — M79.641 RIGHT HAND PAIN: ICD-10-CM

## 2018-06-14 DIAGNOSIS — R20.0 NUMBNESS OF RIGHT HAND: ICD-10-CM

## 2018-06-14 PROCEDURE — 99214 OFFICE O/P EST MOD 30 MIN: CPT | Performed by: NURSE PRACTITIONER

## 2018-06-14 RX ORDER — NICOTINE 21 MG/24HR
1 PATCH, TRANSDERMAL 24 HOURS TRANSDERMAL EVERY 24 HOURS
Qty: 14 PATCH | Refills: 0 | Status: SHIPPED | OUTPATIENT
Start: 2018-06-14 | End: 2018-07-02

## 2018-06-14 RX ORDER — LOSARTAN POTASSIUM 50 MG/1
50 TABLET ORAL DAILY
Qty: 30 TABLET | Refills: 3 | Status: SHIPPED | OUTPATIENT
Start: 2018-06-14 | End: 2018-10-18 | Stop reason: SDUPTHER

## 2018-06-14 RX ORDER — VENLAFAXINE 50 MG/1
50 TABLET ORAL 2 TIMES DAILY
Qty: 60 TABLET | Refills: 3 | Status: SHIPPED | OUTPATIENT
Start: 2018-06-14 | End: 2018-07-25 | Stop reason: SINTOL

## 2018-06-14 NOTE — PATIENT INSTRUCTIONS
Steps to Quit Smoking  Smoking tobacco can be bad for your health. It can also affect almost every organ in your body. Smoking puts you and people around you at risk for many serious long-lasting (chronic) diseases. Quitting smoking is hard, but it is one of the best things that you can do for your health. It is never too late to quit.  What are the benefits of quitting smoking?  When you quit smoking, you lower your risk for getting serious diseases and conditions. They can include:  · Lung cancer or lung disease.  · Heart disease.  · Stroke.  · Heart attack.  · Not being able to have children (infertility).  · Weak bones (osteoporosis) and broken bones (fractures).  If you have coughing, wheezing, and shortness of breath, those symptoms may get better when you quit. You may also get sick less often. If you are pregnant, quitting smoking can help to lower your chances of having a baby of low birth weight.  What can I do to help me quit smoking?  Talk with your doctor about what can help you quit smoking. Some things you can do (strategies) include:  · Quitting smoking totally, instead of slowly cutting back how much you smoke over a period of time.  · Going to in-person counseling. You are more likely to quit if you go to many counseling sessions.  · Using resources and support systems, such as:  ¨ Online chats with a counselor.  ¨ Phone quitlines.  ¨ Printed self-help materials.  ¨ Support groups or group counseling.  ¨ Text messaging programs.  ¨ Mobile phone apps or applications.  · Taking medicines. Some of these medicines may have nicotine in them. If you are pregnant or breastfeeding, do not take any medicines to quit smoking unless your doctor says it is okay. Talk with your doctor about counseling or other things that can help you.  Talk with your doctor about using more than one strategy at the same time, such as taking medicines while you are also going to in-person counseling. This can help make quitting  easier.  What things can I do to make it easier to quit?  Quitting smoking might feel very hard at first, but there is a lot that you can do to make it easier. Take these steps:  · Talk to your family and friends. Ask them to support and encourage you.  · Call phone quitlines, reach out to support groups, or work with a counselor.  · Ask people who smoke to not smoke around you.  · Avoid places that make you want (trigger) to smoke, such as:  ¨ Bars.  ¨ Parties.  ¨ Smoke-break areas at work.  · Spend time with people who do not smoke.  · Lower the stress in your life. Stress can make you want to smoke. Try these things to help your stress:  ¨ Getting regular exercise.  ¨ Deep-breathing exercises.  ¨ Yoga.  ¨ Meditating.  ¨ Doing a body scan. To do this, close your eyes, focus on one area of your body at a time from head to toe, and notice which parts of your body are tense. Try to relax the muscles in those areas.  · Download or buy apps on your mobile phone or tablet that can help you stick to your quit plan. There are many free apps, such as QuitGuide from the CDC (Centers for Disease Control and Prevention). You can find more support from smokefree.gov and other websites.  This information is not intended to replace advice given to you by your health care provider. Make sure you discuss any questions you have with your health care provider.  Document Released: 10/14/2010 Document Revised: 08/15/2017 Document Reviewed: 05/03/2016  [employer] Interactive Patient Education © 2017 [employer]  Varenicline oral tablets  What is this medicine?  VARENICLINE (agatha EN i kleen) is used to help people quit smoking. It can reduce the symptoms caused by stopping smoking. It is used with a patient support program recommended by your physician.  This medicine may be used for other purposes; ask your health care provider or pharmacist if you have questions.  COMMON BRAND NAME(S): Chantix  What should I tell my health care  provider before I take this medicine?  They need to know if you have any of these conditions:  -bipolar disorder, depression, schizophrenia or other mental illness  -heart disease  -if you often drink alcohol  -kidney disease  -peripheral vascular disease  -seizures  -stroke  -suicidal thoughts, plans, or attempt; a previous suicide attempt by you or a family member  -an unusual or allergic reaction to varenicline, other medicines, foods, dyes, or preservatives  -pregnant or trying to get pregnant  -breast-feeding  How should I use this medicine?  Take this medicine by mouth after eating. Take with a full glass of water. Follow the directions on the prescription label. Take your doses at regular intervals. Do not take your medicine more often than directed.  There are 3 ways you can use this medicine to help you quit smoking; talk to your health care professional to decide which plan is right for you:  1) you can choose a quit date and start this medicine 1 week before the quit date, or,  2) you can start taking this medicine before you choose a quit date, and then pick a quit date between day 8 and 35 days of treatment, or,  3) if you are not sure that you are able or willing to quit smoking right away, start taking this medicine and slowly decrease the amount you smoke as directed by your health care professional with the goal of being cigarette-free by week 12 of treatment.  Stick to your plan; ask about support groups or other ways to help you remain cigarette-free. If you are motivated to quit smoking and did not succeed during a previous attempt with this medicine for reasons other than side effects, or if you returned to smoking after this treatment, speak with your health care professional about whether another course of this medicine may be right for you.  A special MedGuide will be given to you by the pharmacist with each prescription and refill. Be sure to read this information carefully each time.  Talk  to your pediatrician regarding the use of this medicine in children. This medicine is not approved for use in children.  Overdosage: If you think you have taken too much of this medicine contact a poison control center or emergency room at once.  NOTE: This medicine is only for you. Do not share this medicine with others.  What if I miss a dose?  If you miss a dose, take it as soon as you can. If it is almost time for your next dose, take only that dose. Do not take double or extra doses.  What may interact with this medicine?  -alcohol or any product that contains alcohol  -insulin  -other stop smoking aids  -theophylline  -warfarin  This list may not describe all possible interactions. Give your health care provider a list of all the medicines, herbs, non-prescription drugs, or dietary supplements you use. Also tell them if you smoke, drink alcohol, or use illegal drugs. Some items may interact with your medicine.  What should I watch for while using this medicine?  Visit your doctor or health care professional for regular check ups. Ask for ongoing advice and encouragement from your doctor or healthcare professional, friends, and family to help you quit. If you smoke while on this medication, quit again  Your mouth may get dry. Chewing sugarless gum or sucking hard candy, and drinking plenty of water may help. Contact your doctor if the problem does not go away or is severe.  You may get drowsy or dizzy. Do not drive, use machinery, or do anything that needs mental alertness until you know how this medicine affects you. Do not stand or sit up quickly, especially if you are an older patient. This reduces the risk of dizzy or fainting spells.  Sleepwalking can happen during treatment with this medicine, and can sometimes lead to behavior that is harmful to you, other people, or property. Stop taking this medicine and tell your doctor if you start sleepwalking or have other unusual sleep-related activity.  Decrease  the amount of alcoholic beverages that you drink during treatment with this medicine until you know if this medicine affects your ability to tolerate alcohol. Some people have experienced increased drunkenness (intoxication), unusual or sometimes aggressive behavior, or no memory of things that have happened (amnesia) during treatment with this medicine.  The use of this medicine may increase the chance of suicidal thoughts or actions. Pay special attention to how you are responding while on this medicine. Any worsening of mood, or thoughts of suicide or dying should be reported to your health care professional right away.  What side effects may I notice from receiving this medicine?  Side effects that you should report to your doctor or health care professional as soon as possible:  -allergic reactions like skin rash, itching or hives, swelling of the face, lips, tongue, or throat  -acting aggressive, being angry or violent, or acting on dangerous impulses  -breathing problems  -changes in vision  -chest pain or chest tightness  -confusion, trouble speaking or understanding  -new or worsening depression, anxiety, or panic attacks  -extreme increase in activity and talking (zach)  -fast, irregular heartbeat  -feeling faint or lightheaded, falls  -fever  -pain in legs when walking  -problems with balance, talking, walking  -redness, blistering, peeling or loosening of the skin, including inside the mouth  -ringing in ears  -seeing or hearing things that aren't there (hallucinations)  -seizures  -sleepwalking  -sudden numbness or weakness of the face, arm or leg  -thoughts about suicide or dying, or attempts to commit suicide  -trouble passing urine or change in the amount of urine  -unusual bleeding or bruising  -unusually weak or tired  Side effects that usually do not require medical attention (report to your doctor or health care professional if they continue or are  bothersome):  -constipation  -headache  -nausea, vomiting  -strange dreams  -stomach gas  -trouble sleeping  This list may not describe all possible side effects. Call your doctor for medical advice about side effects. You may report side effects to FDA at 9-338-FDA-2113.  Where should I keep my medicine?  Keep out of the reach of children.  Store at room temperature between 15 and 30 degrees C (59 and 86 degrees F). Throw away any unused medicine after the expiration date.  NOTE: This sheet is a summary. It may not cover all possible information. If you have questions about this medicine, talk to your doctor, pharmacist, or health care provider.  © 2018 Elsevier/Gold Standard (2016-09-01 16:14:23)    Carpal Tunnel Syndrome  Carpal tunnel syndrome is a condition that causes pain in your hand and arm. The carpal tunnel is a narrow area that is on the palm side of your wrist. Repeated wrist motion or certain diseases may cause swelling in the tunnel. This swelling can pinch the main nerve in the wrist (median nerve).  Follow these instructions at home:  If you have a splint:   · Wear it as told by your doctor. Remove it only as told by your doctor.  · Loosen the splint if your fingers:  ¨ Become numb and tingle.  ¨ Turn blue and cold.  · Keep the splint clean and dry.  General instructions   · Take over-the-counter and prescription medicines only as told by your doctor.  · Rest your wrist from any activity that may be causing your pain. If needed, talk to your employer about changes that can be made in your work, such as getting a wrist pad to use while typing.  · If directed, apply ice to the painful area:  ¨ Put ice in a plastic bag.  ¨ Place a towel between your skin and the bag.  ¨ Leave the ice on for 20 minutes, 2-3 times per day.  · Keep all follow-up visits as told by your doctor. This is important.  · Do any exercises as told by your doctor, physical therapist, or occupational therapist.  Contact a doctor  if:  · You have new symptoms.  · Medicine does not help your pain.  · Your symptoms get worse.  This information is not intended to replace advice given to you by your health care provider. Make sure you discuss any questions you have with your health care provider.  Document Released: 12/06/2012 Document Revised: 05/25/2017 Document Reviewed: 05/04/2016  [employer] Interactive Patient Education © 2017 [employer] Inc.

## 2018-06-14 NOTE — PROGRESS NOTES
CC: follow up on hypertension, hand pain    History:  Magda Palm is a 27 y.o. female who presents today for follow-up for evaluation of the above:  Patient presents today for follow-up on hypertension.  She states that she did start the lisinopril.  Shortly after she did report a dry cough occurring.  Patient states that she was also recommended by her bariatric provider to start Chantix for smoking cessation.  She states she has been using Wellbutrin without noticing any benefit.  Patient also reports right hand and wrist pain.  She states this has started recently.  Her right fifth digit remains did not even know she is relaxing it.  She can move it without issue.  She states that also has numbness.  This pain and numbness is worse sometimes upon waking.    ROS:  Review of Systems   Respiratory: Positive for cough.    Musculoskeletal: Positive for arthralgias.   Neurological: Positive for numbness.   Psychiatric/Behavioral: The patient is nervous/anxious.        Ms. Palm  reports that she has been smoking.  She started smoking about 16 years ago. She has a 5.00 pack-year smoking history. She has never used smokeless tobacco. She reports that she uses drugs, including Marijuana. She reports that she does not drink alcohol.      Current Outpatient Prescriptions:   •  albuterol (PROVENTIL HFA;VENTOLIN HFA) 108 (90 Base) MCG/ACT inhaler, Inhale 2 puffs Every 4 (Four) Hours As Needed for Wheezing., Disp: 1 inhaler, Rfl: 0  •  buPROPion (WELLBUTRIN) 75 MG tablet, Take 1 tablet by mouth 2 (Two) Times a Day., Disp: 60 tablet, Rfl: 6  •  ergocalciferol (ERGOCALCIFEROL) 75103 units capsule, Take 1 capsule by mouth 1 (One) Time Per Week., Disp: 4 capsule, Rfl: 2  •  levothyroxine (SYNTHROID) 200 MCG tablet, Take 1 tablet by mouth Daily., Disp: 30 tablet, Rfl: 6  •  pantoprazole (PROTONIX) 40 MG EC tablet, Take 1 tablet by mouth Daily., Disp: 30 tablet, Rfl: 6  •  raNITIdine (ZANTAC) 75 MG tablet, Take 1 tablet by  "mouth Every Night. (Patient taking differently: Take 75 mg by mouth As Needed.), Disp: 30 tablet, Rfl: 6  •  Aspirin-Acetaminophen-Caffeine (EXCEDRIN PO), Take 2 tablets by mouth As Needed (for headaches and migraines)., Disp: , Rfl:       OBJECTIVE:  /90 (BP Location: Left arm, Patient Position: Sitting, Cuff Size: Large Adult)   Pulse 88   Temp 98.2 °F (36.8 °C) (Oral)   Resp 12   Ht 168.9 cm (66.5\")   Wt (!) 151 kg (333 lb 6 oz)   LMP 06/12/2018 (Exact Date)   SpO2 98%   BMI 53.00 kg/m²    Physical Exam   Constitutional: She is oriented to person, place, and time. She appears well-developed and well-nourished.   HENT:   Head: Normocephalic and atraumatic.   Eyes: EOM are normal. Pupils are equal, round, and reactive to light.   Neck: Normal range of motion. Neck supple.   Cardiovascular: Normal rate, regular rhythm and normal heart sounds.    Pulmonary/Chest: Effort normal and breath sounds normal.   Abdominal: Soft. Bowel sounds are normal.   Musculoskeletal: Normal range of motion.        Right hand: She exhibits normal range of motion, no tenderness, no bony tenderness and no laceration. Decreased sensation noted.        Hands:  Neurological: She is alert and oriented to person, place, and time.   Skin: Skin is warm and dry.   Psychiatric: She has a normal mood and affect.   Vitals reviewed.      Assessment/Plan    Magda was seen today for follow-up, hypertension and hand pain.    Diagnoses and all orders for this visit:    Essential hypertension  -     losartan (COZAAR) 50 MG tablet; Take 1 tablet by mouth Daily.  Discontinue lisinopril and start started in due to cough.      Moderate episode of recurrent major depressive disorder  -     venlafaxine (EFFEXOR) 50 MG tablet; Take 1 tablet by mouth 2 (Two) Times a Day.  Will discontinue Wellbutrin.  Start Effexor.  We will hold off on Chantix at this time due to depression.  She is agreeable to trying nicotine replacement therapy.    Cigarette " smoker  Encounter for smoking cessation counseling  -     nicotine (NICOTINE STEP 1) 21 MG/24HR patch; Place 1 patch on the skin Daily.  I advised Magda of the risks of continuing to use tobacco, and I provided her with tobacco cessation educational materials in the After Visit Summary.     During this visit, I spent 10 minutes counseling the patient regarding tobacco cessation.    Right hand pain  Numbness of right hand  Over-the-counter Aleve or Motrin daily for 3 days.  Discussed rest and icing the right wrist and hand.  Patient can wear wrist brace made for carpal tunnel.  Patient states she already has one of these at home.  I will up if no improvement        An After Visit Summary was printed and given to the patient at discharge.  Return in about 1 month (around 7/14/2018). Sooner if problems arise.         Vangie Monique APRN. 6/14/2018

## 2018-06-22 ENCOUNTER — RESULTS ENCOUNTER (OUTPATIENT)
Dept: BARIATRICS/WEIGHT MGMT | Facility: CLINIC | Age: 28
End: 2018-06-22

## 2018-06-22 DIAGNOSIS — E66.01 OBESITY, MORBID, BMI 50 OR HIGHER (HCC): ICD-10-CM

## 2018-07-02 ENCOUNTER — TELEPHONE (OUTPATIENT)
Dept: INTERNAL MEDICINE | Facility: CLINIC | Age: 28
End: 2018-07-02

## 2018-07-02 DIAGNOSIS — F17.210 CIGARETTE SMOKER: Primary | ICD-10-CM

## 2018-07-02 RX ORDER — NICOTINE 21 MG/24HR
1 PATCH, TRANSDERMAL 24 HOURS TRANSDERMAL EVERY 24 HOURS
Qty: 21 PATCH | Refills: 0 | Status: SHIPPED | OUTPATIENT
Start: 2018-07-02 | End: 2018-08-27 | Stop reason: ALTCHOICE

## 2018-07-02 NOTE — TELEPHONE ENCOUNTER
Patient called and left a message stating she would like to start Step 2 of nicotine patches for smoking cessation.

## 2018-07-12 ENCOUNTER — OFFICE VISIT (OUTPATIENT)
Dept: BARIATRICS/WEIGHT MGMT | Facility: CLINIC | Age: 28
End: 2018-07-12

## 2018-07-12 VITALS
OXYGEN SATURATION: 100 % | DIASTOLIC BLOOD PRESSURE: 97 MMHG | HEIGHT: 67 IN | SYSTOLIC BLOOD PRESSURE: 137 MMHG | TEMPERATURE: 97.8 F | BODY MASS INDEX: 45.99 KG/M2 | WEIGHT: 293 LBS | HEART RATE: 98 BPM

## 2018-07-12 DIAGNOSIS — K21.9 GASTROESOPHAGEAL REFLUX DISEASE WITHOUT ESOPHAGITIS: ICD-10-CM

## 2018-07-12 PROCEDURE — 99213 OFFICE O/P EST LOW 20 MIN: CPT | Performed by: SURGERY

## 2018-07-12 NOTE — PROGRESS NOTES
Patient Care Team:  ANA LILIA Sorto as PCP - General (Family Medicine)    Reason for Visit:  Surgical Weight loss    Subjective     Patient is a 27 y.o. female presents with morbid obesity and her Body mass index is 52.91 kg/m².     She is here for discussion about the esophagogastroduodenoscopy procedure.  She stated she has been with the disease of obesity for year(s).  She stated she suffers from retention, heartburn and joint pain due to her weight gain.  She stated that weight loss helps alleviate these symptoms.   She stated that she has tried multiple diet regimens to help with weight loss.  She stated that she has attempted these conservative methods for weight loss without maintaining long term success.        Review of Systems  General ROS: positive for  - fatigue and sleep disturbance  Respiratory ROS: no cough, shortness of breath, or wheezing  Cardiovascular ROS: no chest pain or dyspnea on exertion  Gastrointestinal ROS: no abdominal pain, change in bowel habits, or black or bloody stools  Musculoskeletal ROS: positive for - joint pain    History  Past Medical History:   Diagnosis Date   • ADD (attention deficit disorder)    • Anxiety    • Constipation    • Depression    • Diarrhea    • Essential hypertension 5/14/2018   • Heart burn    • Insomnia    • Pain     shoulder, knee, back     Past Surgical History:   Procedure Laterality Date   • CHOLECYSTECTOMY      lap   • WISDOM TOOTH EXTRACTION       Family History   Problem Relation Age of Onset   • No Known Problems Mother    • No Known Problems Father    • Heart disease Maternal Grandmother    • Cancer Maternal Grandmother    • Stroke Maternal Grandmother    • Diabetes Maternal Grandmother      Social History   Substance Use Topics   • Smoking status: Former Smoker     Packs/day: 0.50     Years: 10.00     Start date: 5/2/2002     Quit date: 7/10/2018   • Smokeless tobacco: Never Used      Comment: it is hard, stresses her out   • Alcohol use No        (Not in a hospital admission)  Allergies:  Lisinopril; Penicillins; and Norco [hydrocodone-acetaminophen]      Current Outpatient Prescriptions:   •  albuterol (PROVENTIL HFA;VENTOLIN HFA) 108 (90 Base) MCG/ACT inhaler, Inhale 2 puffs Every 4 (Four) Hours As Needed for Wheezing., Disp: 1 inhaler, Rfl: 0  •  Aspirin-Acetaminophen-Caffeine (EXCEDRIN PO), Take 2 tablets by mouth As Needed (for headaches and migraines)., Disp: , Rfl:   •  levothyroxine (SYNTHROID) 200 MCG tablet, Take 1 tablet by mouth Daily., Disp: 30 tablet, Rfl: 6  •  losartan (COZAAR) 50 MG tablet, Take 1 tablet by mouth Daily., Disp: 30 tablet, Rfl: 3  •  nicotine (NICODERM CQ) 14 MG/24HR patch, Place 1 patch on the skin Daily., Disp: 21 patch, Rfl: 0  •  pantoprazole (PROTONIX) 40 MG EC tablet, Take 1 tablet by mouth Daily., Disp: 30 tablet, Rfl: 6  •  raNITIdine (ZANTAC) 75 MG tablet, Take 1 tablet by mouth Every Night. (Patient taking differently: Take 75 mg by mouth As Needed.), Disp: 30 tablet, Rfl: 6  •  venlafaxine (EFFEXOR) 50 MG tablet, Take 1 tablet by mouth 2 (Two) Times a Day., Disp: 60 tablet, Rfl: 3  •  ergocalciferol (ERGOCALCIFEROL) 77456 units capsule, Take 1 capsule by mouth 1 (One) Time Per Week., Disp: 4 capsule, Rfl: 2    Objective     Vital Signs  Temp:  [97.8 °F (36.6 °C)] 97.8 °F (36.6 °C)  Heart Rate:  [98] 98  BP: (137)/(97) 137/97  Body mass index is 52.91 kg/m².  1    07/12/18  1424   Weight: (!) 151 kg (332 lb 12.8 oz)       Physical Exam:     HEENT: extra ocular movement intact  Respiratory: appears well, vitals normal, no respiratory distress, acyanotic, normal RR, chest clear, no wheezing, crepitations, rhonchi, normal symmetric air entry  Cardiovascular: Regular rate and rhythm, S1, S2 normal, no murmur, click, rub or gallop  GI: Soft, non-tender, normal bowel sounds; no bruits, organomegaly or masses.  Abnormal shape: obese  Musculoskeletal: inspection - no abnormality  Neurologic: alert, oriented, normal  speech, no focal findings or movement disorder noted       Results Review:   None        Assessment/Plan   Encounter Diagnoses   Name Primary?   • Body mass index (BMI) of 50-59.9 in adult (CMS/formerly [facility]) Yes   • Gastroesophageal reflux disease without esophagitis              1.  I believe this patient will be a good candidate for weight loss surgery.  I have discussed the Jeison - Y Gastric Bypass, laparoscopic sleeve gastrectomy and the Laparoscopic Gastric Band procedures.  We discussed the benefits of the surgeries including the benefit of weight loss and the possible reversal of co-morbid conditions associated with morbid obesity. I explained to the patient that prior to making a definitive decision on the type of surgery she will require an esophagogastroduodenoscopy with biopsies to assess for any contraindications for surgical weight loss.  I explained the alternatives  include not doing anything, or pursuing an UGI series which only offers a diagnosis with potential less accuracy compared to EGD, the benefits of the EGD such as identifying the pathology and anatomy of the upper GI system, and the risks and complications of the endoscopy were discussed in detail as well. I discussed the risk of perforation (one out of [id number], riskier with dilation), bleeding (one out of 500), and the rare risks of infection, adverse reaction to anesthesia, respiratory failure, cardiac failure including MI and adverse reaction to medications such as allergic reactions. We discussed consequences that could occur if a risk were to develop such as the need for hospitalization, blood transfusion, surgical intervention, medications, pain, disability and death. The patient verbalizes understanding and agrees to proceed. such as bleeding, perforation, swallowing difficulties and gas bloat can occur after this procedure.    Upon completion of our discussion and addressing and answering her questions to her satisfation, informed consent  was obtained.  She will be scheduled accordingly for the esophagogastroduodenoscopy procedure.    I discussed the patients findings and my recommendations with patient.     Dr. Benny Rueda MD [facility]    07/12/18  4:42 PM  Patient Care Team:  ANA LILIA Sorto as PCP - General (Family Medicine)

## 2018-07-13 ENCOUNTER — TELEPHONE (OUTPATIENT)
Dept: BARIATRICS/WEIGHT MGMT | Facility: CLINIC | Age: 28
End: 2018-07-13

## 2018-07-13 NOTE — TELEPHONE ENCOUNTER
Called unable to leave [facility] 08-10-18 9am   EGD 07-31-18 arrival 8:30 am

## 2018-07-16 RX ORDER — ALBUTEROL SULFATE 90 UG/1
AEROSOL, METERED RESPIRATORY (INHALATION)
Qty: 18 G | Refills: 0 | Status: SHIPPED | OUTPATIENT
Start: 2018-07-16 | End: 2019-05-24

## 2018-07-17 ENCOUNTER — TELEPHONE (OUTPATIENT)
Dept: BARIATRICS/WEIGHT MGMT | Facility: CLINIC | Age: 28
End: 2018-07-17

## 2018-07-17 NOTE — TELEPHONE ENCOUNTER
[facility]         EGD  Apt 07-31-18 8:30 am   [facility] August 10th 9am       07-13-18 called both numbers-unavailable     07-17-18 called both numbers unavailable     Letter sent to patient    07-17-18 patient called back notified of appointments

## 2018-07-20 ENCOUNTER — RESULTS ENCOUNTER (OUTPATIENT)
Dept: BARIATRICS/WEIGHT MGMT | Facility: CLINIC | Age: 28
End: 2018-07-20

## 2018-07-20 DIAGNOSIS — E66.01 OBESITY, MORBID, BMI 50 OR HIGHER (HCC): ICD-10-CM

## 2018-07-25 ENCOUNTER — OFFICE VISIT (OUTPATIENT)
Dept: INTERNAL MEDICINE | Facility: CLINIC | Age: 28
End: 2018-07-25

## 2018-07-25 VITALS
OXYGEN SATURATION: 96 % | RESPIRATION RATE: 12 BRPM | BODY MASS INDEX: 45.99 KG/M2 | SYSTOLIC BLOOD PRESSURE: 140 MMHG | HEART RATE: 92 BPM | TEMPERATURE: 98.3 F | DIASTOLIC BLOOD PRESSURE: 98 MMHG | WEIGHT: 293 LBS | HEIGHT: 67 IN

## 2018-07-25 DIAGNOSIS — F33.1 MODERATE EPISODE OF RECURRENT MAJOR DEPRESSIVE DISORDER (HCC): Primary | ICD-10-CM

## 2018-07-25 DIAGNOSIS — H65.91 MIDDLE EAR EFFUSION, RIGHT: ICD-10-CM

## 2018-07-25 PROCEDURE — 99214 OFFICE O/P EST MOD 30 MIN: CPT | Performed by: NURSE PRACTITIONER

## 2018-07-25 RX ORDER — FLUTICASONE PROPIONATE 50 MCG
2 SPRAY, SUSPENSION (ML) NASAL DAILY
Qty: 15.8 ML | Refills: 3 | Status: SHIPPED | OUTPATIENT
Start: 2018-07-25 | End: 2019-05-24

## 2018-07-25 RX ORDER — FLUOXETINE 10 MG/1
10 CAPSULE ORAL DAILY
Qty: 30 CAPSULE | Refills: 3 | Status: SHIPPED | OUTPATIENT
Start: 2018-07-25 | End: 2018-12-28 | Stop reason: DRUGHIGH

## 2018-07-25 NOTE — PROGRESS NOTES
CC: f/u depression    History:  Magda Palm is a 27 y.o. female who presents today for follow-up for evaluation of the above:    Patient presents today for follow-up on depression.  Patient states that she has stopped her Effexor due to side effect of sleepiness.  She states that she did not take her losartan today and her blood pressure is elevated due to this.  She states that she was trying to figure out which medication was causing her sleepiness.  Patient states that this time she is smoke free.  She was congratulated on her success.    ROS:  Review of Systems   Constitutional: Negative for fatigue and unexpected weight change.   HENT: Positive for ear pain.    Eyes: Negative.    Respiratory: Negative.    Cardiovascular: Negative.    Gastrointestinal: Negative for abdominal pain, constipation and diarrhea.   Endocrine: Negative.    Genitourinary: Negative for difficulty urinating, dyspareunia, genital sores, menstrual problem, pelvic pain, vaginal bleeding, vaginal discharge and vaginal pain.   Musculoskeletal: Negative.    Skin: Negative.    Neurological: Negative.    Psychiatric/Behavioral: Positive for dysphoric mood.       Ms. Palm  reports that she quit smoking about 2 weeks ago. She started smoking about 16 years ago. She has a 5.00 pack-year smoking history. She has never used smokeless tobacco. She reports that she uses drugs, including Marijuana. She reports that she does not drink alcohol.      Current Outpatient Prescriptions:   •  Aspirin-Acetaminophen-Caffeine (EXCEDRIN PO), Take 2 tablets by mouth As Needed (for headaches and migraines)., Disp: , Rfl:   •  levothyroxine (SYNTHROID) 200 MCG tablet, Take 1 tablet by mouth Daily., Disp: 30 tablet, Rfl: 6  •  losartan (COZAAR) 50 MG tablet, Take 1 tablet by mouth Daily., Disp: 30 tablet, Rfl: 3  •  pantoprazole (PROTONIX) 40 MG EC tablet, Take 1 tablet by mouth Daily., Disp: 30 tablet, Rfl: 6  •  raNITIdine (ZANTAC) 75 MG tablet, Take 1 tablet  "by mouth Every Night. (Patient taking differently: Take 75 mg by mouth As Needed.), Disp: 30 tablet, Rfl: 6  •  ergocalciferol (ERGOCALCIFEROL) 75441 units capsule, Take 1 capsule by mouth 1 (One) Time Per Week., Disp: 4 capsule, Rfl: 2  •  nicotine (NICODERM CQ) 14 MG/24HR patch, Place 1 patch on the skin Daily., Disp: 21 patch, Rfl: 0  •  VENTOLIN  (90 Base) MCG/ACT inhaler, INHALE 2 PUFFS BY MOUTH EVERY 4 HOURS AS NEEDED FOR WHEEZING, Disp: 18 g, Rfl: 0      OBJECTIVE:  /98 (BP Location: Left arm, Patient Position: Sitting, Cuff Size: Large Adult)   Pulse 92   Temp 98.3 °F (36.8 °C) (Oral)   Resp 12   Ht 168.9 cm (66.5\")   Wt (!) 150 kg (330 lb 2 oz)   SpO2 96%   BMI 52.49 kg/m²    Physical Exam   Constitutional: She is oriented to person, place, and time. She appears well-developed and well-nourished.   HENT:   Head: Normocephalic and atraumatic.   Right Ear: A middle ear effusion is present.   Eyes: Pupils are equal, round, and reactive to light. EOM are normal.   Neck: Normal range of motion. Neck supple.   Cardiovascular: Normal rate, regular rhythm and normal heart sounds.    Pulmonary/Chest: Effort normal and breath sounds normal.   Abdominal: Soft. Bowel sounds are normal.   Musculoskeletal: Normal range of motion.   Neurological: She is alert and oriented to person, place, and time.   Skin: Skin is warm and dry.   Psychiatric: She has a normal mood and affect.   Vitals reviewed.      Assessment/Plan    Magda was seen today for follow-up.    Diagnoses and all orders for this visit:    Moderate episode of recurrent major depressive disorder (CMS/HCC)  -     FLUoxetine (PROZAC) 10 MG capsule; Take 1 capsule by mouth Daily.  Patient to stop effexor and will start prozac. I advised that it will take 3-4 weeks to see some effect and 6-8 weeks to see full effect.  If the dose is ineffective or suboptimal, the patient should notify the clinic for a consideration of a dose increase.  The " patient was advised that starting this medication could worsen symptoms of SI/HI. We discussed this as an emergency and reason to seek care immediately. The patient expressed understanding.     Middle ear effusion, right  -     fluticasone (FLONASE) 50 MCG/ACT nasal spray; 2 sprays into the nostril(s) as directed by provider Daily.    Patient has follow-up with bariatric surgery next week.      An After Visit Summary was printed and given to the patient at discharge.  Return in about 1 month (around 8/25/2018). Sooner if problems arise.         Vangie Monique APRN. 7/25/2018

## 2018-07-31 ENCOUNTER — ANESTHESIA EVENT (OUTPATIENT)
Dept: GASTROENTEROLOGY | Facility: HOSPITAL | Age: 28
End: 2018-07-31

## 2018-07-31 ENCOUNTER — HOSPITAL ENCOUNTER (OUTPATIENT)
Facility: HOSPITAL | Age: 28
Setting detail: HOSPITAL OUTPATIENT SURGERY
Discharge: HOME | End: 2018-07-31
Attending: SURGERY | Admitting: ANESTHESIOLOGY

## 2018-07-31 ENCOUNTER — ANESTHESIA (OUTPATIENT)
Dept: GASTROENTEROLOGY | Facility: HOSPITAL | Age: 28
End: 2018-07-31

## 2018-07-31 VITALS
TEMPERATURE: 98.5 F | BODY MASS INDEX: 45.99 KG/M2 | OXYGEN SATURATION: 97 % | RESPIRATION RATE: 17 BRPM | WEIGHT: 293 LBS | SYSTOLIC BLOOD PRESSURE: 113 MMHG | DIASTOLIC BLOOD PRESSURE: 62 MMHG | HEART RATE: 83 BPM | HEIGHT: 67 IN

## 2018-07-31 LAB — B-HCG UR QL: NEGATIVE

## 2018-07-31 PROCEDURE — 87081 CULTURE SCREEN ONLY: CPT | Performed by: SURGERY

## 2018-07-31 PROCEDURE — 81025 URINE PREGNANCY TEST: CPT | Performed by: ANESTHESIOLOGY

## 2018-07-31 PROCEDURE — 25010000002 PROPOFOL 10 MG/ML EMULSION: Performed by: NURSE ANESTHETIST, CERTIFIED REGISTERED

## 2018-07-31 PROCEDURE — 43239 EGD BIOPSY SINGLE/MULTIPLE: CPT | Performed by: SURGERY

## 2018-07-31 PROCEDURE — 88305 TISSUE EXAM BY PATHOLOGIST: CPT | Performed by: SURGERY

## 2018-07-31 RX ORDER — PROPOFOL 10 MG/ML
VIAL (ML) INTRAVENOUS AS NEEDED
Status: DISCONTINUED | OUTPATIENT
Start: 2018-07-31 | End: 2018-07-31 | Stop reason: SURG

## 2018-07-31 RX ORDER — LIDOCAINE HYDROCHLORIDE 20 MG/ML
INJECTION, SOLUTION INFILTRATION; PERINEURAL AS NEEDED
Status: DISCONTINUED | OUTPATIENT
Start: 2018-07-31 | End: 2018-07-31 | Stop reason: SURG

## 2018-07-31 RX ORDER — SODIUM CHLORIDE 9 MG/ML
500 INJECTION, SOLUTION INTRAVENOUS CONTINUOUS PRN
Status: DISCONTINUED | OUTPATIENT
Start: 2018-07-31 | End: 2018-07-31 | Stop reason: HOSPADM

## 2018-07-31 RX ORDER — SODIUM CHLORIDE 0.9 % (FLUSH) 0.9 %
3 SYRINGE (ML) INJECTION AS NEEDED
Status: DISCONTINUED | OUTPATIENT
Start: 2018-07-31 | End: 2018-07-31 | Stop reason: HOSPADM

## 2018-07-31 RX ADMIN — LIDOCAINE HYDROCHLORIDE 100 MG: 20 INJECTION, SOLUTION INFILTRATION; PERINEURAL at 09:30

## 2018-07-31 RX ADMIN — SODIUM CHLORIDE: 0.9 INJECTION, SOLUTION INTRAVENOUS at 09:24

## 2018-07-31 RX ADMIN — PROPOFOL 100 MG: 10 INJECTION, EMULSION INTRAVENOUS at 09:31

## 2018-07-31 RX ADMIN — PROPOFOL 100 MG: 10 INJECTION, EMULSION INTRAVENOUS at 09:30

## 2018-07-31 NOTE — ANESTHESIA POSTPROCEDURE EVALUATION
Patient: Magda Palm    Procedure Summary     Date:  07/31/18 Room / Location:   [facility]    Anesthesia Start:  0927 Anesthesia Stop:  0940    Procedure:  ESOPHAGOGASTRODUODENOSCOPY WITH ANESTHESIA (N/A ) Diagnosis:       Body mass index (BMI) of 50-59.9 in adult (CMS/HCC)      (Body mass index (BMI) of 50-59.9 in adult (CMS/HCC) [Z68.43])    Surgeon:  Benny Rueda MD Provider:  Efrain Naylor CRNA    Anesthesia Type:  general ASA Status:  3          Anesthesia Type: general  Last vitals  BP   118/76 (07/31/18 0945)   Temp   98.5 °F (36.9 °C) (07/31/18 0832)   Pulse   104 (07/31/18 0945)   Resp   15 (07/31/18 0945)     SpO2   99 % (07/31/18 0945)     Anesthesia Post Evaluation

## 2018-07-31 NOTE — ANESTHESIA PREPROCEDURE EVALUATION
Anesthesia Evaluation     Patient summary reviewed and Nursing notes reviewed   no history of anesthetic complications:  NPO Solid Status: > 8 hours  NPO Liquid Status: > 8 hours           Airway   Mallampati: I  TM distance: >3 FB  Neck ROM: full  No difficulty expected  Dental      Pulmonary     breath sounds clear to auscultation  (+) a smoker Current,   Cardiovascular   Exercise tolerance: good (4-7 METS)    Rhythm: regular  Rate: normal    (+) hypertension,   (-) CAD      Neuro/Psych  (+) psychiatric history Anxiety and Depression,     (-) seizures, TIA, CVA  GI/Hepatic/Renal/Endo    (+) morbid obesity, GERD,  hypothyroidism,     Musculoskeletal     Abdominal    Substance History      OB/GYN          Other                        Anesthesia Plan    ASA 3     general     intravenous induction   Anesthetic plan and risks discussed with patient.

## 2018-08-01 LAB
CYTO UR: NORMAL
LAB AP CASE REPORT: NORMAL
PATH REPORT.FINAL DX SPEC: NORMAL
PATH REPORT.GROSS SPEC: NORMAL
UREASE TISS QL: NEGATIVE

## 2018-08-17 ENCOUNTER — RESULTS ENCOUNTER (OUTPATIENT)
Dept: BARIATRICS/WEIGHT MGMT | Facility: CLINIC | Age: 28
End: 2018-08-17

## 2018-08-17 DIAGNOSIS — E66.01 OBESITY, MORBID, BMI 50 OR HIGHER (HCC): ICD-10-CM

## 2018-08-27 ENCOUNTER — OFFICE VISIT (OUTPATIENT)
Dept: BARIATRICS/WEIGHT MGMT | Facility: CLINIC | Age: 28
End: 2018-08-27

## 2018-08-27 ENCOUNTER — OFFICE VISIT (OUTPATIENT)
Dept: INTERNAL MEDICINE | Facility: CLINIC | Age: 28
End: 2018-08-27

## 2018-08-27 VITALS
OXYGEN SATURATION: 97 % | WEIGHT: 293 LBS | SYSTOLIC BLOOD PRESSURE: 122 MMHG | RESPIRATION RATE: 12 BRPM | HEIGHT: 66 IN | HEART RATE: 72 BPM | BODY MASS INDEX: 47.09 KG/M2 | TEMPERATURE: 98.6 F | DIASTOLIC BLOOD PRESSURE: 80 MMHG

## 2018-08-27 VITALS
HEIGHT: 66 IN | DIASTOLIC BLOOD PRESSURE: 77 MMHG | OXYGEN SATURATION: 99 % | SYSTOLIC BLOOD PRESSURE: 125 MMHG | HEART RATE: 75 BPM | BODY MASS INDEX: 47.09 KG/M2 | TEMPERATURE: 98.6 F | WEIGHT: 293 LBS

## 2018-08-27 DIAGNOSIS — R53.83 FATIGUE, UNSPECIFIED TYPE: ICD-10-CM

## 2018-08-27 DIAGNOSIS — F17.210 CIGARETTE SMOKER: Primary | ICD-10-CM

## 2018-08-27 DIAGNOSIS — K21.9 GASTROESOPHAGEAL REFLUX DISEASE WITHOUT ESOPHAGITIS: ICD-10-CM

## 2018-08-27 DIAGNOSIS — F17.200 SMOKER: ICD-10-CM

## 2018-08-27 DIAGNOSIS — F32.1 MODERATE SINGLE CURRENT EPISODE OF MAJOR DEPRESSIVE DISORDER (HCC): ICD-10-CM

## 2018-08-27 DIAGNOSIS — G47.00 INSOMNIA, UNSPECIFIED TYPE: ICD-10-CM

## 2018-08-27 PROCEDURE — 99213 OFFICE O/P EST LOW 20 MIN: CPT | Performed by: SURGERY

## 2018-08-27 PROCEDURE — 99214 OFFICE O/P EST MOD 30 MIN: CPT | Performed by: NURSE PRACTITIONER

## 2018-08-27 RX ORDER — ZOLPIDEM TARTRATE 5 MG/1
5 TABLET ORAL NIGHTLY PRN
Qty: 10 TABLET | Refills: 0 | Status: SHIPPED | OUTPATIENT
Start: 2018-08-27 | End: 2018-09-07 | Stop reason: SDUPTHER

## 2018-08-27 NOTE — PATIENT INSTRUCTIONS
Zolpidem tablets  What is this medicine?  ZOLPIDEM (zole PI dem) is used to treat insomnia. This medicine helps you to fall asleep and sleep through the night.  This medicine may be used for other purposes; ask your health care provider or pharmacist if you have questions.  COMMON BRAND NAME(S): Veronika  What should I tell my health care provider before I take this medicine?  They need to know if you have any of these conditions:  -depression  -history of drug abuse or addiction  -if you often drink alcohol  -liver disease  -lung or breathing disease  -myasthenia gravis  -sleep apnea  -suicidal thoughts, plans, or attempt; a previous suicide attempt by you or a family member  -an unusual or allergic reaction to zolpidem, other medicines, foods, dyes, or preservatives  -pregnant or trying to get pregnant  -breast-feeding  How should I use this medicine?  Take this medicine by mouth with a glass of water. Follow the directions on the prescription label. It is better to take this medicine on an empty stomach and only when you are ready for bed. Do not take your medicine more often than directed. If you have been taking this medicine for several weeks and suddenly stop taking it, you may get unpleasant withdrawal symptoms. Your doctor or health care professional may want to gradually reduce the dose. Do not stop taking this medicine on your own. Always follow your doctor or health care professional's advice.  A special MedGuide will be given to you by the pharmacist with each prescription and refill. Be sure to read this information carefully each time.  Talk to your pediatrician regarding the use of this medicine in children. Special care may be needed.  Overdosage: If you think you have taken too much of this medicine contact a poison control center or emergency room at once.  NOTE: This medicine is only for you. Do not share this medicine with others.  What if I miss a dose?  This does not apply. This medicine should  "only be taken immediately before going to sleep. Do not take double or extra doses.  What may interact with this medicine?  -alcohol  -antihistamines for allergy, cough and cold  -certain medicines for anxiety or sleep  -certain medicines for depression, like amitriptyline, fluoxetine, sertraline  -certain medicines for fungal infections like ketoconazole and itraconazole  -certain medicines for seizures like phenobarbital, primidone  -ciprofloxacin  -dietary supplements for sleep, like valerian or kava kava  -general anesthetics like halothane, isoflurane, methoxyflurane, propofol  -local anesthetics like lidocaine, pramoxine, tetracaine  -medicines that relax muscles for surgery  -narcotic medicines for pain  -phenothiazines like chlorpromazine, mesoridazine, prochlorperazine, thioridazine  -rifampin  This list may not describe all possible interactions. Give your health care provider a list of all the medicines, herbs, non-prescription drugs, or dietary supplements you use. Also tell them if you smoke, drink alcohol, or use illegal drugs. Some items may interact with your medicine.  What should I watch for while using this medicine?  Visit your doctor or health care professional for regular checks on your progress. Keep a regular sleep schedule by going to bed at about the same time each night. Avoid caffeine-containing drinks in the evening hours. When sleep medicines are used every night for more than a few weeks, they may stop working. Talk to your doctor if you still have trouble sleeping.  After taking this medicine for sleep, you may get up out of bed while not being fully awake and do an activity that you do not know you are doing. The next morning, you may have no memory of the event. Activities such as driving a car (\"sleep-driving\"), making and eating food, talking on the phone, sexual activity, and sleep-walking have been reported. Call your doctor right away if you find out you have done any of these " activities. Do not take this medicine if you have used alcohol that evening or before bed or taken another medicine for sleep since your risk of doing these sleep-related activities will be increased.  Wait for at least 8 hours after you take a dose before driving or doing other activities that require full mental alertness. Do not take this medicine unless you are able to stay in bed for a full night (7 to 8 hours) before you must be active again. You may have a decrease in mental alertness the day after use, even if you feel that you are fully awake. Tell your doctor if you will need to perform activities requiring full alertness, such as driving, the next day. Do not stand or sit up quickly after taking this medicine, especially if you are an older patient. This reduces the risk of dizzy or fainting spells.  If you or your family notice any changes in your behavior, such as new or worsening depression, thoughts of harming yourself, anxiety, other unusual or disturbing thoughts, or memory loss, call your doctor right away.  After you stop taking this medicine, you may have trouble falling asleep. This is called rebound insomnia. This problem usually goes away on its own after 1 or 2 nights.  What side effects may I notice from receiving this medicine?  Side effects that you should report to your doctor or health care professional as soon as possible:  -allergic reactions like skin rash, itching or hives, swelling of the face, lips, or tongue  -breathing problems  -changes in vision  -confusion  -depressed mood or other changes in moods or emotions  -feeling faint or lightheaded, falls  -hallucinations  -loss of balance or coordination  -loss of memory  -numbness or tingling of the tongue  -restlessness, excitability, or feelings of anxiety or agitation  -signs and symptoms of liver injury like dark yellow or brown urine; general ill feeling or flu-like symptoms; light-colored stools; loss of appetite; nausea;  right upper belly pain; unusually weak or tired; yellowing of the eyes or skin  -suicidal thoughts  -unusual activities while asleep like driving, eating, making phone calls, or sexual activity  Side effects that usually do not require medical attention (report to your doctor or health care professional if they continue or are bothersome):  -dizziness  -drowsiness the day after you take this medicine  -headache  This list may not describe all possible side effects. Call your doctor for medical advice about side effects. You may report side effects to FDA at 0-565-FDA-7820.  Where should I keep my medicine?  Keep out of the reach of children. This medicine can be abused. Keep your medicine in a safe place to protect it from theft. Do not share this medicine with anyone. Selling or giving away this medicine is dangerous and against the law.  This medicine may cause accidental overdose and death if taken by other adults, children, or pets. Mix any unused medicine with a substance like cat litter or coffee grounds. Then throw the medicine away in a sealed container like a sealed bag or a coffee can with a lid. Do not use the medicine after the expiration date.  Store at room temperature between 20 and 25 degrees C (68 and 77 degrees F).  NOTE: This sheet is a summary. It may not cover all possible information. If you have questions about this medicine, talk to your doctor, pharmacist, or health care provider.  © 2018 [employer] (2017-03-22 14:38:20)

## 2018-08-27 NOTE — PROGRESS NOTES
CC: f/u depression      History:  Magda Palm is a 28 y.o. female who presents today for follow-up for evaluation of the above:  Patient presents today for f/u on anxiety and depression. She started Prozac approximately one month ago and states she is doing well. She also c/o fatigue during the day and insomnia at night. She states she feels more tired after she sleeps.   She is trying to schedule weight loss surgery and needs to quit smoking.      ROS:  Review of Systems   Constitutional: Positive for fatigue. Negative for unexpected weight change.   HENT: Negative.    Eyes: Negative.    Respiratory: Negative.    Cardiovascular: Negative.    Gastrointestinal: Negative for abdominal pain, constipation and diarrhea.   Endocrine: Negative.    Genitourinary: Negative for difficulty urinating, dyspareunia, genital sores, menstrual problem, pelvic pain, vaginal bleeding, vaginal discharge and vaginal pain.   Musculoskeletal: Negative.    Skin: Negative.    Neurological: Negative.    Psychiatric/Behavioral: Positive for sleep disturbance.       Ms. Palm  reports that she has quit smoking. Her smoking use included Cigarettes. She started smoking about 16 years ago. She has a 2.50 pack-year smoking history. She has never used smokeless tobacco. She reports that she uses drugs, including Marijuana. She reports that she does not drink alcohol.      Current Outpatient Prescriptions:   •  FLUoxetine (PROZAC) 10 MG capsule, Take 1 capsule by mouth Daily., Disp: 30 capsule, Rfl: 3  •  fluticasone (FLONASE) 50 MCG/ACT nasal spray, 2 sprays into the nostril(s) as directed by provider Daily., Disp: 15.8 mL, Rfl: 3  •  levothyroxine (SYNTHROID) 200 MCG tablet, Take 1 tablet by mouth Daily., Disp: 30 tablet, Rfl: 6  •  pantoprazole (PROTONIX) 40 MG EC tablet, Take 1 tablet by mouth Daily., Disp: 30 tablet, Rfl: 6  •  raNITIdine (ZANTAC) 75 MG tablet, Take 1 tablet by mouth Every Night. (Patient taking differently: Take 75 mg  "by mouth As Needed.), Disp: 30 tablet, Rfl: 6  •  VENTOLIN  (90 Base) MCG/ACT inhaler, INHALE 2 PUFFS BY MOUTH EVERY 4 HOURS AS NEEDED FOR WHEEZING, Disp: 18 g, Rfl: 0  •  Aspirin-Acetaminophen-Caffeine (EXCEDRIN PO), Take 2 tablets by mouth As Needed (for headaches and migraines)., Disp: , Rfl:   •  losartan (COZAAR) 50 MG tablet, Take 1 tablet by mouth Daily., Disp: 30 tablet, Rfl: 3      OBJECTIVE:  /80 (BP Location: Left arm, Patient Position: Sitting, Cuff Size: Large Adult)   Pulse 72   Temp 98.6 °F (37 °C) (Oral)   Resp 12   Ht 166.4 cm (65.5\")   Wt (!) 153 kg (336 lb 6 oz)   SpO2 97%   BMI 55.12 kg/m²    Physical Exam   Constitutional: She is oriented to person, place, and time. She appears well-developed and well-nourished.   HENT:   Head: Normocephalic and atraumatic.   Eyes: Pupils are equal, round, and reactive to light. EOM are normal.   Neck: Normal range of motion. Neck supple.   Cardiovascular: Normal rate, regular rhythm and normal heart sounds.    Pulmonary/Chest: Effort normal and breath sounds normal.   Abdominal: Soft. Bowel sounds are normal.   Musculoskeletal: Normal range of motion.   Neurological: She is alert and oriented to person, place, and time.   Skin: Skin is warm and dry.   Psychiatric: She has a normal mood and affect.   Vitals reviewed.      Assessment/Plan    Magda was seen today for follow-up, depression and hypertension.    Diagnoses and all orders for this visit:    Cigarette smoker  -     varenicline (CHANTIX STARTING MONTH PAK) 0.5 MG X 11 & 1 MG X 42 tablet; Take 0.5 mg one daily on days 1-3 and and 0.5 mg twice daily on days 4-7.Then 1 mg twice daily for a total of 12 weeks.   The patient was advised that starting this medication could worsen symptoms of SI/HI. We discussed this as an emergency and reason to seek care immediately. The patient expressed understanding.     Moderate single current episode of major depressive disorder (CMS/HCC)  Continue " Prozac at this time.       Fatigue, unspecified type  Insomnia, unspecified type  -     zolpidem (AMBIEN) 5 MG tablet; Take 1 tablet by mouth At Night As Needed for Sleep.    If no improvement on Ambien would recommend a sleep study.    An After Visit Summary was printed and given to the patient at discharge.  Return in about 1 month (around 9/27/2018). Sooner if problems arise.         Vangie Monique APRN. 8/27/2018

## 2018-08-27 NOTE — PROGRESS NOTES
Patient Care Team:  Vangie Monique APRN as PCP - General (Family Medicine)  Benny Rueda MD as Referring Physician (General Surgery)  Bg Chester MD as Cardiologist (Cardiology)    Reason for Visit:  Surgical Weight loss    Subjective     Patient is a 28 y.o. female presents with morbid obesity and her Body mass index is 56.08 kg/m².     She is here for discussion of surgical weight loss options.  She stated she has been with the disease of obesity for year(s).  She stated she suffers from heartburn, joint pain and hypertension due to her weight gain.  She stated that weight loss helps alleviate these symptoms.   She stated that she has tried multiple diet regimens to help with weight loss.  She stated that she has attempted these conservative methods for weight loss without maintaining long term success.  Today she would like to discuss surgical weight loss options such as the Laparoscopic Sleeve Gastrectomy or the Laparoscopic R - Y Gastric Bypass.     Review of Systems  General ROS: positive for  - sleep disturbance  Respiratory ROS: positive for - cough  negative for - hemoptysis, pleuritic pain or shortness of breath  Cardiovascular ROS: no chest pain or dyspnea on exertion  Gastrointestinal ROS: no abdominal pain, change in bowel habits, or black or bloody stools    History  Past Medical History:   Diagnosis Date   • ADD (attention deficit disorder)    • Anxiety    • Constipation    • Depression    • Diarrhea    • Essential hypertension 5/14/2018   • Heart burn    • Insomnia    • Pain     shoulder, knee, back     Past Surgical History:   Procedure Laterality Date   • CHOLECYSTECTOMY      lap   • ENDOSCOPY N/A 7/31/2018    Procedure: ESOPHAGOGASTRODUODENOSCOPY WITH ANESTHESIA;  Surgeon: Benny Rueda MD;  Location: [facility] ENDOSCOPY;  Service: General   • WISDOM TOOTH EXTRACTION       Family History   Problem Relation Age of Onset   • No Known Problems Mother    • No Known Problems Father     • Heart disease Maternal Grandmother    • Cancer Maternal Grandmother    • Stroke Maternal Grandmother    • Diabetes Maternal Grandmother      Social History   Substance Use Topics   • Smoking status: Former Smoker     Packs/day: 0.25     Years: 10.00     Types: Cigarettes     Start date: 5/2/2002   • Smokeless tobacco: Never Used      Comment: it is hard, stresses her out   • Alcohol use No       (Not in a hospital admission)  Allergies:  Lisinopril; Penicillins; and Norco [hydrocodone-acetaminophen]      Current Outpatient Prescriptions:   •  Aspirin-Acetaminophen-Caffeine (EXCEDRIN PO), Take 2 tablets by mouth As Needed (for headaches and migraines)., Disp: , Rfl:   •  FLUoxetine (PROZAC) 10 MG capsule, Take 1 capsule by mouth Daily., Disp: 30 capsule, Rfl: 3  •  fluticasone (FLONASE) 50 MCG/ACT nasal spray, 2 sprays into the nostril(s) as directed by provider Daily., Disp: 15.8 mL, Rfl: 3  •  levothyroxine (SYNTHROID) 200 MCG tablet, Take 1 tablet by mouth Daily., Disp: 30 tablet, Rfl: 6  •  losartan (COZAAR) 50 MG tablet, Take 1 tablet by mouth Daily., Disp: 30 tablet, Rfl: 3  •  pantoprazole (PROTONIX) 40 MG EC tablet, Take 1 tablet by mouth Daily., Disp: 30 tablet, Rfl: 6  •  raNITIdine (ZANTAC) 75 MG tablet, Take 1 tablet by mouth Every Night. (Patient taking differently: Take 75 mg by mouth As Needed.), Disp: 30 tablet, Rfl: 6  •  VENTOLIN  (90 Base) MCG/ACT inhaler, INHALE 2 PUFFS BY MOUTH EVERY 4 HOURS AS NEEDED FOR WHEEZING, Disp: 18 g, Rfl: 0    Objective     Vital Signs  Temp:  [98.6 °F (37 °C)] 98.6 °F (37 °C)  Heart Rate:  [75] 75  BP: (125)/(77) 125/77  Body mass index is 56.08 kg/m².  1    08/27/18  1122   Weight: (!) 155 kg (342 lb 3.2 oz)       Physical Exam:     HEENT: extra ocular movement intact  Respiratory: appears well, vitals normal, no respiratory distress, acyanotic, normal RR, chest clear, no wheezing, crepitations, rhonchi, normal symmetric air entry  Cardiovascular: Regular  rate and rhythm, S1, S2 normal, no murmur, click, rub or gallop  GI: Soft, non-tender, normal bowel sounds; no bruits, organomegaly or masses.  Abnormal shape: obese  Musculoskeletal: inspection - no abnormality  Neurologic: alert, oriented, normal speech, no focal findings or movement disorder noted       Results Review:   I reviewed the patient's new clinical results.        Assessment/Plan   Encounter Diagnoses   Name Primary?   • Body mass index (BMI) of 50-59.9 in adult (CMS/[facility]) Yes   • Gastroesophageal reflux disease without esophagitis    • Smoker        I believe this patient will be a good candidate for weight loss surgery.    She has chosen laparoscopic sleeve gastrectomy. I agree with this decision.  I have discussed the Jeison - Y Gastric Bypass, laparoscopic sleeve gastrectomy and the Laparoscopic Gastric Band procedures to provide the alternatives which includes non surgical weight loss options as well.  We discussed the benefits of the surgeries including the benefit of weight loss and the possible reversal of co-morbid conditions associated with morbid obesity.  She is aware that the Sleeve procedure is not reversible.  We discussed the complications and risks which include the risk of perforation, leakage,bleeding, intra-abdominal organ injury, stenosis or ulcerations, the risk of venous thrombosis formation in the lungs, mesentery veins or lower extremities  leading to possible organ injury and death.  I explained to her the possibility that this procedure may not be performed laparoscopically and may require being converted to an opened procedure or aborted due to abnormal anatomy.  Also postoperatively there is a risk of increased GERD symptoms after this procedure.  I have explained if she develops intestinal metaplasia of her esophagus consideration for conversion to another weight loss procedure may be necessary.  Upon completion of our discussion and addressing and answering her questions to  her satisfation, informed consent was obtained.   She will be scheduled accordingly for a laparoscopic Sleeve gastrectomy procedure.      I discussed the patient's findings and my recommendations with patient.     I have also recommended that she obtain  nicotine levels  prior to surgery.    Dr. Benny Rueda MD [facility]    08/27/18  1:02 PM  Patient Care Team:  Vangie Monique APRN as PCP - General (Family Medicine)  Benny Rueda MD as Referring Physician (General Surgery)  Bg Chester MD as Cardiologist (Cardiology)

## 2018-09-07 DIAGNOSIS — G47.00 INSOMNIA, UNSPECIFIED TYPE: ICD-10-CM

## 2018-09-07 RX ORDER — ZOLPIDEM TARTRATE 5 MG/1
5 TABLET ORAL NIGHTLY PRN
Qty: 30 TABLET | Refills: 0 | OUTPATIENT
Start: 2018-09-07

## 2018-09-07 RX ORDER — ZOLPIDEM TARTRATE 5 MG/1
5 TABLET ORAL NIGHTLY PRN
Qty: 30 TABLET | Refills: 1 | Status: SHIPPED | OUTPATIENT
Start: 2018-09-07 | End: 2018-12-28 | Stop reason: ALTCHOICE

## 2018-09-07 NOTE — TELEPHONE ENCOUNTER
Patient called to report to Vangie the [facility] is working and she would like a refill of the medication.    Refill request sent.

## 2018-09-07 NOTE — TELEPHONE ENCOUNTER
Please advise that we will be happy to refill when it is time, but it is currently too soon.  Was just started on 08/27.  Advise her to call back 3 days before she is out of medication for refill.

## 2018-09-14 ENCOUNTER — OFFICE VISIT (OUTPATIENT)
Dept: PSYCHIATRY | Age: 28
End: 2018-09-14
Payer: MEDICAID

## 2018-09-14 ENCOUNTER — RESULTS ENCOUNTER (OUTPATIENT)
Dept: BARIATRICS/WEIGHT MGMT | Facility: CLINIC | Age: 28
End: 2018-09-14

## 2018-09-14 DIAGNOSIS — F43.23 ADJUSTMENT DISORDER WITH MIXED ANXIETY AND DEPRESSED MOOD: Primary | ICD-10-CM

## 2018-09-14 DIAGNOSIS — E66.01 OBESITY, MORBID, BMI 50 OR HIGHER (HCC): ICD-10-CM

## 2018-09-14 PROCEDURE — 90791 PSYCH DIAGNOSTIC EVALUATION: CPT | Performed by: COUNSELOR

## 2018-09-14 NOTE — PROGRESS NOTES
Initial Session Note  [facility]  9/14/2018  11:57 AM      Time spent with Patient: 47 minutes  This is patient's first  Therapy appointment. Reason for Consult: Pre-Surgical psychological assessment  Referring Provider: No referring provider defined for this encounter. Pt provided informed consent for the behavioral health program. Discussed with patient model of service to include the limits of confidentiality (i.e. abuse reporting, suicide intervention, etc.) and short-term intervention focused approach. Discussed no show and late cancellation policy. Pt indicated understanding. Christelle Monaco ,a 29 y.o. female, for initial evaluation visit. Reason:    Pt is here for pre-surgical psychological assessment for bariatric surgery. Pt has been in the program for a couple of months and has been trying to eat 3 meals per day and quit smoking 8 days ago with the help of Chantix. Pt states her wife and her in-laws are supportive of her. Pt expresses concern for post-surgery- having excess skin and worrying if she will gain it back over time. Therapist offered follow up therapy. Denies SI, HI and AVH at this time. Social History:  Born in [place] and raised in [place] then moved here when she was 25. Has been living off and on in [place] and here in [facility]. Raised by her mother and grandmother. Pt is technically  but has been  since 2009. She has a wife here now but they aren't technically  yet until her divorce goes through. Pt has 3 children ages 9, 10 and 2 (they're all her partners/wife's children). Current Medications:  Scheduled Meds:   Current Outpatient Prescriptions:     naproxen (NAPROXEN) 500 MG EC tablet, Take 1 tablet by mouth 2 times daily (with meals), Disp: 60 tablet, Rfl: 0      History:   Previous attempts at weight management:  Non-surgical attempts: Has tried all the fad diets and weight loss pills.   Weight and Diet history: states as a young child she was \"skinny\". In middle school and high school she remembers being bigger than everyone else but wasn't obese until 8 years ago. Behavioral/emotional factors of eating habits: \"I like don't eat at all and then eat late at night. \"    Maladaptive patterns of eating:  Binge eating: denies  Overeating: sometimes  Grazing: no  Night Eating (Syndrome): yes    Physical Activity and Inactivity:  Engaged in physical activity: was walking 30 minutes a day and is having knee issues now she it's decreased  What is the pt's physical activity plan pre-surgery: walk as much as possible  What is the pt's plan post-surgery: \"keep walking and do way more. I want to get into the gym. \"  Is it a reasonable/realistic plan: yes    Health-Related Risk-Taking Behavior:  Impulsive behaviors: no  Compulsive behaviors: no  Eating habit (mindful eating vs unregulated eating): trying to be more mindful but still getting educated on everything. History of Medication Compliance (psychiatric and/or medical):  Medication compliant. Knowledge of Morbid Obesity and Surgical Interventions:  Does the pt have a good understanding of the nature and mechanics of their particular surgery as well as the possible risks and complications of the procedure? \"yes. \"    Do they have a good understanding of what is expected post-op, including diet, exercise, follow-up, support group attendance, etc? \"yes. \"   How has the pt obtained this info: bariatric office  Reason pt wants surgery (health related issues vs cosmetic issues): \"So I can live for my kids and for my health. \"    History of Chronic Pain: denies    Body Dysmorphia: no    Past Psychiatric History:   Previous therapy: 1 time and stopped going  Previous psychiatric treatment and medication trials: yes for depression and ADD  Previous psychiatric hospitalizations: denies  Previous diagnoses: depression   Previous suicide attempts:no; self harmed by cutting.   Family history of mental where  Guilty/hopeless: \"yes, all the time. \"  S.I.B.s/risky behavior: no  any drugs: yes  alcohol: no     Mental Status Evaluation:     Appearance:  casually dressed, overweight, piercings and tattooed   Behavior:  Within Normal Limits   Speech:  normal pitch and normal volume   Mood:  anxious and depressed   Affect:  normal   Thought Process:  within normal limits   Thought Content: Within normal limits   Sensorium:  person, place, time/date and situation   Cognition:  grossly intact   Insight:  good   Judgment:  good     Suicidal Intentions: No  Suicidal Plan:  No    Other Pertinent Information:  Social Support system:   Current relationship:yes   Relies on others for help:no   Independent self care:yes   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 Scheduled to see NP for 2nd half of bariatric assessment  2.  Recommended therapy- pt did not schedule an appointment today but states she will call if she needs it in the future    Pt interventions:  Provided education, Discussed self-care (sleep, nutrition, rewarding activities, social support, exercise), Established rapport, Conducted functional assessment, [place]-setting to identify pt's primary goals for [facility] visit / overall health, Supportive techniques and Emphasized self-care as important for managing overall health       Curt Camargo, [facility]

## 2018-09-20 ENCOUNTER — OFFICE VISIT (OUTPATIENT)
Dept: CARDIOLOGY | Facility: CLINIC | Age: 28
End: 2018-09-20

## 2018-09-20 VITALS
HEIGHT: 68 IN | HEART RATE: 84 BPM | BODY MASS INDEX: 44.41 KG/M2 | WEIGHT: 293 LBS | DIASTOLIC BLOOD PRESSURE: 82 MMHG | OXYGEN SATURATION: 99 % | SYSTOLIC BLOOD PRESSURE: 130 MMHG

## 2018-09-20 DIAGNOSIS — I10 ESSENTIAL HYPERTENSION: ICD-10-CM

## 2018-09-20 DIAGNOSIS — Z01.818 PRE-OP EVALUATION: Primary | ICD-10-CM

## 2018-09-20 PROCEDURE — 93000 ELECTROCARDIOGRAM COMPLETE: CPT | Performed by: INTERNAL MEDICINE

## 2018-09-20 PROCEDURE — 99204 OFFICE O/P NEW MOD 45 MIN: CPT | Performed by: INTERNAL MEDICINE

## 2018-09-20 RX ORDER — MULTIVIT-MIN/IRON/FOLIC ACID/K 18-600-40
4000 CAPSULE ORAL DAILY
COMMUNITY
End: 2018-09-28

## 2018-09-20 NOTE — PROGRESS NOTES
Subjective:     Encounter Date:09/20/2018      Patient ID: Magda Palm is a 28 y.o. female with no prior cardiac history but with a history of hypertension and former tobacco abuse who is referred here for preoperative risk assessment    Referring provider: Justo Rueda M.D.    Reason for referral: Preoperative risk assessment    Chief Complaint: Preoperative examination    Hypertension   This is a chronic problem. The current episode started more than 1 month ago. The problem is unchanged. The problem is controlled. Associated symptoms include anxiety and shortness of breath. Pertinent negatives include no blurred vision, chest pain, headaches, neck pain, orthopnea, palpitations or PND. Agents associated with hypertension include NSAIDs and thyroid hormones. Risk factors for coronary artery disease include obesity, smoking/tobacco exposure and stress. Current antihypertension treatment includes angiotensin blockers. The current treatment provides significant improvement. There are no compliance problems.  There is no history of angina, kidney disease, CAD/MI, CVA, heart failure or PVD.     This is a 28-year-old female with no personal history of cardiovascular disease but with a history of hypertension and previous tobacco abuse who presents for preoperative risk assessment in the setting of possible upcoming gastric surgery.  The patient says that she has never had any personal history of cardiovascular disease.  She denies any significant chest pain.  She denies having any increase in what she describes as baseline mild shortness of breath and dyspnea on exertion which she attributes to obesity and previous smoking status.  As stated, patient was a previous smoker but has started Chantix and is no longer smoking.  The patient denies orthopnea, PND, edema, palpitations, lightheadedness, dizziness, syncope.  The patient says that she can easily climb up a flight of steps or even 2 without having any  significant symptoms other than her baseline shortness of breath.  She does not have any history of diabetes, renal disease, previous stroke.  She says that overall she has been feeling well lately.    The following portions of the patient's history were reviewed and updated as appropriate: allergies, current medications, past family history, past medical history, past social history, past surgical history and problem list.     Past Medical History:   Diagnosis Date   • ADD (attention deficit disorder)    • Anxiety    • Constipation    • Depression    • Diarrhea    • Essential hypertension 5/14/2018   • Heart burn    • Insomnia    • Pain     shoulder, knee, back     Past Surgical History:   Procedure Laterality Date   • CHOLECYSTECTOMY      lap   • ENDOSCOPY N/A 7/31/2018    Procedure: ESOPHAGOGASTRODUODENOSCOPY WITH ANESTHESIA;  Surgeon: Benny Rueda MD;  Location: [facility] ENDOSCOPY;  Service: General   • WISDOM TOOTH EXTRACTION         Current Outpatient Prescriptions:   •  Aspirin-Acetaminophen-Caffeine (EXCEDRIN PO), Take 2 tablets by mouth As Needed (for headaches and migraines)., Disp: , Rfl:   •  FLUoxetine (PROZAC) 10 MG capsule, Take 1 capsule by mouth Daily., Disp: 30 capsule, Rfl: 3  •  fluticasone (FLONASE) 50 MCG/ACT nasal spray, 2 sprays into the nostril(s) as directed by provider Daily., Disp: 15.8 mL, Rfl: 3  •  levothyroxine (SYNTHROID) 200 MCG tablet, Take 1 tablet by mouth Daily., Disp: 30 tablet, Rfl: 6  •  losartan (COZAAR) 50 MG tablet, Take 1 tablet by mouth Daily., Disp: 30 tablet, Rfl: 3  •  pantoprazole (PROTONIX) 40 MG EC tablet, Take 1 tablet by mouth Daily., Disp: 30 tablet, Rfl: 6  •  raNITIdine (ZANTAC) 75 MG tablet, Take 1 tablet by mouth Every Night. (Patient taking differently: Take 75 mg by mouth As Needed.), Disp: 30 tablet, Rfl: 6  •  varenicline (CHANTIX STARTING MONTH PAK) 0.5 MG X 11 & 1 MG X 42 tablet, Take 0.5 mg one daily on days 1-3 and and 0.5 mg twice daily on days  4-7.Then 1 mg twice daily for a total of 12 weeks., Disp: 53 tablet, Rfl: 0  •  VENTOLIN  (90 Base) MCG/ACT inhaler, INHALE 2 PUFFS BY MOUTH EVERY 4 HOURS AS NEEDED FOR WHEEZING, Disp: 18 g, Rfl: 0  •  Vitamin D, Cholecalciferol, 1000 units tablet, Take 4,000 Units by mouth Daily., Disp: , Rfl:   •  zolpidem (AMBIEN) 5 MG tablet, Take 1 tablet by mouth At Night As Needed for Sleep., Disp: 30 tablet, Rfl: 1    Allergies   Allergen Reactions   • Lisinopril Cough   • Norco [Hydrocodone-Acetaminophen] Nausea Only and Unknown (See Comments)     Stomach pain   • Penicillins Hives and Nausea And Vomiting     Social History   Substance Use Topics   • Smoking status: Former Smoker     Packs/day: 0.25     Years: 10.00     Types: Cigarettes     Start date: 5/2/2002     Quit date: 09/2018   • Smokeless tobacco: Never Used      Comment: it is hard, stresses her out   • Alcohol use No     Family History   Problem Relation Age of Onset   • No Known Problems Mother    • No Known Problems Father    • Heart disease Maternal Grandmother    • Cancer Maternal Grandmother    • Stroke Maternal Grandmother    • Diabetes Maternal Grandmother      Review of Systems   Constitution: Negative for chills, fever, night sweats and weight loss.   HENT: Negative for congestion and hearing loss.    Eyes: Negative for blurred vision and pain.   Cardiovascular: Positive for dyspnea on exertion. Negative for chest pain, claudication, irregular heartbeat, leg swelling, orthopnea, palpitations, paroxysmal nocturnal dyspnea and syncope.   Respiratory: Positive for shortness of breath. Negative for cough, hemoptysis and wheezing.    Endocrine: Negative for cold intolerance, heat intolerance, polydipsia and polyuria.   Hematologic/Lymphatic: Negative for adenopathy and bleeding problem. Does not bruise/bleed easily.   Skin: Negative for color change, poor wound healing and rash.   Musculoskeletal: Negative for arthritis, back pain, joint pain, joint  swelling, myalgias and neck pain.   Gastrointestinal: Negative for abdominal pain, change in bowel habit, constipation, diarrhea, heartburn, hematochezia, melena, nausea and vomiting.   Genitourinary: Negative for bladder incontinence, dysuria, frequency, hematuria and nocturia.   Neurological: Negative for dizziness, focal weakness, headaches, light-headedness, loss of balance, numbness and seizures.   Psychiatric/Behavioral: Negative for altered mental status, memory loss and substance abuse. The patient is nervous/anxious.    Allergic/Immunologic: Negative for hives and persistent infections.       ECG 12 Lead  Date/Time: 9/20/2018 11:08 AM  Performed by: AVIS GONZALES  Authorized by: AVIS GONZALES   Previous ECG: no previous ECG available  Rhythm: sinus rhythm  Rate: normal  Conduction: conduction normal  ST Segments: ST segments normal  T Waves: T waves normal  QRS axis: normal  Other: no other findings  Clinical impression: normal ECG           Objective:     Physical Exam   Constitutional: She is oriented to person, place, and time. Vital signs are normal. She appears well-developed and well-nourished. She is cooperative.  Non-toxic appearance. No distress.   HENT:   Head: Normocephalic and atraumatic.   Right Ear: External ear normal.   Left Ear: External ear normal.   Nose: Nose normal.   Mouth/Throat: Uvula is midline, oropharynx is clear and moist and mucous membranes are normal. Mucous membranes are not pale, not dry and not cyanotic. No oropharyngeal exudate.   Eyes: Pupils are equal, round, and reactive to light. EOM and lids are normal.   Neck: Normal range of motion. Neck supple. No hepatojugular reflux and no JVD present. Carotid bruit is not present. No tracheal deviation and no edema present. No thyroid mass and no thyromegaly present.   Cardiovascular: Normal rate, regular rhythm, S1 normal, S2 normal, normal heart sounds, intact distal pulses and normal pulses.   No  "extrasystoles are present. PMI is not displaced.  Exam reveals no gallop and no friction rub.    No murmur heard.  Pulses:       Radial pulses are 2+ on the right side, and 2+ on the left side.        Femoral pulses are 2+ on the right side, and 2+ on the left side.       Dorsalis pedis pulses are 2+ on the right side, and 2+ on the left side.        Posterior tibial pulses are 2+ on the right side, and 2+ on the left side.   Pulmonary/Chest: Effort normal and breath sounds normal. No accessory muscle usage. No respiratory distress. She has no wheezes. She has no rales. She exhibits no tenderness.   Abdominal: Soft. Normal appearance and bowel sounds are normal. She exhibits no distension, no abdominal bruit and no pulsatile midline mass. There is no hepatosplenomegaly. There is no tenderness.   Musculoskeletal: Normal range of motion. She exhibits no edema, tenderness or deformity.   Lymphadenopathy:     She has no cervical adenopathy.   Neurological: She is oriented to person, place, and time. She has normal strength. No cranial nerve deficit.   Skin: Skin is warm, dry and intact. No rash noted. She is not diaphoretic. No cyanosis or erythema. Nails show no clubbing.   Psychiatric: She has a normal mood and affect. Her speech is normal and behavior is normal. Thought content normal.   Vitals reviewed.    /82 (BP Location: Left arm, Patient Position: Sitting)   Pulse 84   Ht 172.7 cm (68\")   Wt (!) 155 kg (342 lb)   SpO2 99%   BMI 52.00 kg/m²     Lab Review:     Lab Results   Component Value Date    CHOL 200 05/03/2018    TRIG 274 (H) 05/03/2018    HDL 29 (L) 05/03/2018     (H) 05/03/2018         Assessment:          Diagnosis Plan   1. Pre-op evaluation  ECG 12 Lead   2. Essential hypertension     3. Body mass index (BMI) of 50-59.9 in adult (CMS/HCC)            Plan:       1.  Preoperative evaluation: The patient is to undergo intermediate risk surgery. Based on the Revised Cardiac Risk Index, " the patient has no clinical risk factors. The patient's estimated functional capacity is greater than 4 metabolic equivalents. At this level of activity, the patient does not have any significant symptoms. Therefore, given this patient's risk profile and current clinical stability with good functional capacity, I do not feel that any further preoperative cardiovascular testing is indicated at this time, as any such testing would not necessarily lower this patient's operative risk.    2.  Essential hypertension: Blood pressures currently well controlled on current medications    3.  Obesity: Patient's Body mass index is 52 kg/m². BMI is above normal parameters. Recommendations include: exercise counseling and nutrition counseling.  The patient continues to follow with the bariatrics clinic.    Follow-up: as needed

## 2018-09-24 RX ORDER — VARENICLINE TARTRATE 1 MG/1
1 TABLET, FILM COATED ORAL 2 TIMES DAILY
Qty: 60 TABLET | Refills: 1 | Status: SHIPPED | OUTPATIENT
Start: 2018-09-24 | End: 2018-12-28

## 2018-09-28 ENCOUNTER — OFFICE VISIT (OUTPATIENT)
Dept: INTERNAL MEDICINE | Facility: CLINIC | Age: 28
End: 2018-09-28

## 2018-09-28 ENCOUNTER — OFFICE VISIT (OUTPATIENT)
Dept: BARIATRICS/WEIGHT MGMT | Facility: CLINIC | Age: 28
End: 2018-09-28

## 2018-09-28 VITALS
WEIGHT: 293 LBS | OXYGEN SATURATION: 98 % | BODY MASS INDEX: 45.99 KG/M2 | HEART RATE: 73 BPM | RESPIRATION RATE: 16 BRPM | TEMPERATURE: 98.5 F | SYSTOLIC BLOOD PRESSURE: 148 MMHG | DIASTOLIC BLOOD PRESSURE: 90 MMHG | HEIGHT: 67 IN

## 2018-09-28 VITALS
WEIGHT: 293 LBS | SYSTOLIC BLOOD PRESSURE: 146 MMHG | HEART RATE: 86 BPM | DIASTOLIC BLOOD PRESSURE: 89 MMHG | TEMPERATURE: 97.8 F | OXYGEN SATURATION: 100 % | BODY MASS INDEX: 45.99 KG/M2 | HEIGHT: 67 IN

## 2018-09-28 DIAGNOSIS — H60.501 ACUTE OTITIS EXTERNA OF RIGHT EAR, UNSPECIFIED TYPE: ICD-10-CM

## 2018-09-28 DIAGNOSIS — E03.8 OTHER SPECIFIED HYPOTHYROIDISM: Primary | ICD-10-CM

## 2018-09-28 DIAGNOSIS — G89.29 CHRONIC INTRACTABLE HEADACHE, UNSPECIFIED HEADACHE TYPE: ICD-10-CM

## 2018-09-28 DIAGNOSIS — G47.30 SLEEP APNEA, UNSPECIFIED TYPE: ICD-10-CM

## 2018-09-28 DIAGNOSIS — E66.01 OBESITY, MORBID, BMI 50 OR HIGHER (HCC): Primary | ICD-10-CM

## 2018-09-28 DIAGNOSIS — Z87.891 FORMER SMOKER: ICD-10-CM

## 2018-09-28 DIAGNOSIS — R51.9 CHRONIC INTRACTABLE HEADACHE, UNSPECIFIED HEADACHE TYPE: ICD-10-CM

## 2018-09-28 DIAGNOSIS — G47.00 INSOMNIA, UNSPECIFIED TYPE: ICD-10-CM

## 2018-09-28 PROCEDURE — 99214 OFFICE O/P EST MOD 30 MIN: CPT | Performed by: NURSE PRACTITIONER

## 2018-09-28 PROCEDURE — 99212 OFFICE O/P EST SF 10 MIN: CPT | Performed by: NURSE PRACTITIONER

## 2018-09-28 RX ORDER — SUMATRIPTAN 25 MG/1
TABLET, FILM COATED ORAL
Qty: 12 TABLET | Refills: 3 | Status: SHIPPED | OUTPATIENT
Start: 2018-09-28 | End: 2018-12-28

## 2018-09-28 RX ORDER — CIPROFLOXACIN AND DEXAMETHASONE 3; 1 MG/ML; MG/ML
4 SUSPENSION/ DROPS AURICULAR (OTIC) 2 TIMES DAILY
Qty: 7.5 ML | Refills: 0 | Status: SHIPPED | OUTPATIENT
Start: 2018-09-28 | End: 2018-10-01

## 2018-09-28 NOTE — PROGRESS NOTES
"Subjective   Magda Palm is a 28 y.o. female.     History of Present Illness   Magda Palm is here with morbid obesity and desires to have surgery for weight loss. This is the patient's 5th visit to the office.  Patient has been cleared by cardiologist. She has one more appt to see [facility]. She has had her EGD done. Her PCP is monitoring her TSH. She quit smoking on 9/10/18. She has not smoked any marijuana in a few weeks. She will have nicotine and drug screen ordered at next visit.  Patient has been exercising by walking.  Patient has been making health dietary choices and eating 2-3 meals per day with protein at each. Patient has been eating  grams of protein per day. Patient is drinking 64-80 ounces of water per day. She is drinking some soda.   Vitals:    09/28/18 1135   BP: 146/89   BP Location: Right arm   Patient Position: Sitting   Cuff Size: Adult   Pulse: 86   Temp: 97.8 °F (36.6 °C)   SpO2: 100%   Weight: (!) 156 kg (344 lb 3.2 oz)   Height: 168.9 cm (66.5\")         The following portions of the patient's history were reviewed and updated as appropriate: allergies, current medications, past family history, past medical history, past social history, past surgical history and problem list.    Review of Systems   Constitutional: Positive for fatigue. Negative for activity change and appetite change.   HENT: Positive for sore throat.    Eyes: Negative.    Respiratory: Positive for cough. Negative for apnea, chest tightness and shortness of breath.    Cardiovascular: Negative.  Negative for chest pain, palpitations and leg swelling.   Gastrointestinal: Positive for nausea. Negative for abdominal pain, anal bleeding, constipation and vomiting.   Endocrine: Negative.    Genitourinary: Negative.    Musculoskeletal: Positive for arthralgias. Negative for back pain.   Skin: Negative.    Allergic/Immunologic: Negative.    Neurological: Positive for headaches. Negative for seizures and syncope. "   Hematological: Negative.  Does not bruise/bleed easily.   Psychiatric/Behavioral: Positive for dysphoric mood and sleep disturbance. Negative for self-injury and suicidal ideas.       Objective   Physical Exam   Constitutional: She is oriented to person, place, and time. Vital signs are normal. She appears well-developed and well-nourished. She is cooperative. No distress.   HENT:   Head: Normocephalic and atraumatic.   Nose: Nose normal.   Mouth/Throat: Oropharynx is clear and moist. No oropharyngeal exudate or tonsillar abscesses.   Eyes: Pupils are equal, round, and reactive to light. Conjunctivae, EOM and lids are normal. Right eye exhibits no discharge. Left eye exhibits no discharge.   Neck: Trachea normal. Neck supple. No JVD present. Carotid bruit is not present. No neck rigidity. No tracheal deviation present. No thyromegaly present.   Cardiovascular: Normal rate, regular rhythm, S1 normal, S2 normal and normal heart sounds.    Pulmonary/Chest: Effort normal. No stridor. No respiratory distress. She has decreased breath sounds in the right middle field, the right lower field, the left middle field and the left lower field. She has no wheezes. She has no rales.   Abdominal: Soft. Bowel sounds are normal. She exhibits no distension. There is no tenderness.   obese   Musculoskeletal: She exhibits no edema.        Right shoulder: She exhibits normal strength.   Lymphadenopathy:     She has no cervical adenopathy.   Neurological: She is alert and oriented to person, place, and time. She has normal strength. No cranial nerve deficit.   Skin: Skin is warm, dry and intact. No rash noted.   Psychiatric: She has a normal mood and affect. Her speech is normal and behavior is normal.   Alert and oriented x 3   Vitals reviewed.      Assessment/Plan   Magda was seen today for follow-up, obesity and nutrition counseling.    Diagnoses and all orders for this visit:    Obesity, morbid, BMI 50 or higher  (CMS/[facility])  Comments:  working toward bariatric surgery    Former smoker  Comments:  quit smoking this month; will order nicotine and drug testing at next visit          Magda Palm has done well this month with healthy changes. Patient has gained 2 pounds, but has quit smoking. Great job on smoking cessation!  Today we discussed healthy changes in lifestyle, diet, and exercise.   Handout provided on weight proofing your home.   Intensive behavioral therapy for obesity was done today.   Goals for this month are: 3 meals per day with protein at each; eat protein first, use smaller plate; exercise as advised.  Keep second psych appt.   Follow up in one month for a weight recheck.

## 2018-09-28 NOTE — PATIENT INSTRUCTIONS
Magda Palm has done well this month with healthy changes. Patient has gained 2 pounds, but has quit smoking. Great job on smoking cessation!  Today we discussed healthy changes in lifestyle, diet, and exercise.   Handout provided on weight proofing your home.   Intensive behavioral therapy for obesity was done today.   Goals for this month are: 3 meals per day with protein at each; eat protein first, use smaller plate; exercise as advised.  Keep second psych appt.   Follow up in one month for a weight recheck.

## 2018-09-28 NOTE — PROGRESS NOTES
CC: headache, insomnia, right ear pain.    History:  Magda Palm is a 28 y.o. female who presents today for follow-up for evaluation of the above:  Patient presents today for f/u on hypothyroidism. She needs for TSH to be normal for bariatric surgery.   She does c/o Right ear pain for a month. She has tried Flonase with no improvement. Denies discharge or popping.  She also c/o headache on the right side of her head. She states she has associated nausea and blurry vision in her right eye. She has tried OTC Excedrin with no relief. She is due for new glasses.   She states her insomnia has not improved with Ambien. She states she wakes up feeling like she stopped breathing. Only getting a couple hours of sleep each night. She has tried changing sleep hygiene with no improvement.        ROS:  Review of Systems   Constitutional: Negative for fatigue and unexpected weight change.   HENT: Positive for ear pain.    Eyes: Positive for visual disturbance.   Respiratory: Negative.    Cardiovascular: Negative.    Gastrointestinal: Negative for abdominal pain, constipation and diarrhea.   Endocrine: Negative.    Genitourinary: Negative for difficulty urinating, dyspareunia, genital sores, menstrual problem, pelvic pain, vaginal bleeding, vaginal discharge and vaginal pain.   Musculoskeletal: Negative.    Skin: Negative.    Neurological: Positive for headaches.   Psychiatric/Behavioral: Positive for sleep disturbance.       Ms. Palm  reports that she quit smoking about 2 weeks ago. Her smoking use included Cigarettes. She started smoking about 16 years ago. She has a 2.50 pack-year smoking history. She has never used smokeless tobacco. She reports that she uses drugs, including Marijuana. She reports that she does not drink alcohol.      Current Outpatient Prescriptions:   •  Aspirin-Acetaminophen-Caffeine (EXCEDRIN PO), Take 2 tablets by mouth As Needed (for headaches and migraines)., Disp: , Rfl:   •  Cholecalciferol  "(VITAMIN D PO), Take 2,000 Units by mouth Daily., Disp: , Rfl:   •  FLUoxetine (PROZAC) 10 MG capsule, Take 1 capsule by mouth Daily., Disp: 30 capsule, Rfl: 3  •  fluticasone (FLONASE) 50 MCG/ACT nasal spray, 2 sprays into the nostril(s) as directed by provider Daily., Disp: 15.8 mL, Rfl: 3  •  levothyroxine (SYNTHROID) 200 MCG tablet, Take 1 tablet by mouth Daily., Disp: 30 tablet, Rfl: 6  •  losartan (COZAAR) 50 MG tablet, Take 1 tablet by mouth Daily., Disp: 30 tablet, Rfl: 3  •  pantoprazole (PROTONIX) 40 MG EC tablet, Take 1 tablet by mouth Daily., Disp: 30 tablet, Rfl: 6  •  raNITIdine (ZANTAC) 75 MG tablet, Take 1 tablet by mouth Every Night. (Patient taking differently: Take 75 mg by mouth As Needed.), Disp: 30 tablet, Rfl: 6  •  varenicline (CHANTIX) 1 MG tablet, Take 1 tablet by mouth 2 (Two) Times a Day., Disp: 60 tablet, Rfl: 1  •  zolpidem (AMBIEN) 5 MG tablet, Take 1 tablet by mouth At Night As Needed for Sleep., Disp: 30 tablet, Rfl: 1  •  SUMAtriptan (IMITREX) 25 MG tablet, Take one tablet at onset of headache. May repeat dose one time in 2 hours if headache not relieved., Disp: 12 tablet, Rfl: 3  •  VENTOLIN  (90 Base) MCG/ACT inhaler, INHALE 2 PUFFS BY MOUTH EVERY 4 HOURS AS NEEDED FOR WHEEZING, Disp: 18 g, Rfl: 0      OBJECTIVE:  /90 (BP Location: Left arm, Patient Position: Sitting)   Pulse 73   Temp 98.5 °F (36.9 °C) (Oral)   Resp 16   Ht 168.9 cm (66.5\")   Wt (!) 154 kg (340 lb 1.6 oz)   SpO2 98%   BMI 54.07 kg/m²    Physical Exam   Constitutional: She is oriented to person, place, and time. She appears well-developed and well-nourished.   HENT:   Head: Normocephalic and atraumatic.   Right Ear: Tympanic membrane normal. There is swelling (of anterior ear canal. ).   Left Ear: Tympanic membrane and ear canal normal.   Eyes: Pupils are equal, round, and reactive to light. EOM are normal.   Neck: Normal range of motion. Neck supple.   Cardiovascular: Normal rate, regular " rhythm and normal heart sounds.    Pulmonary/Chest: Effort normal and breath sounds normal.   Abdominal: Soft. Bowel sounds are normal.   Musculoskeletal: Normal range of motion.   Neurological: She is alert and oriented to person, place, and time.   Skin: Skin is warm and dry.   Psychiatric: She has a normal mood and affect.   Vitals reviewed.      Assessment/Plan    Magda was seen today for medication problem, headache and earache.    Diagnoses and all orders for this visit:    Other specified hypothyroidism  -     TSH; Future  -     T4, free; Future    Insomnia, unspecified type  -     Ambulatory Referral to Sleep Medicine  -     Home Sleep Study; Future    Sleep apnea, unspecified type  -     Ambulatory Referral to Sleep Medicine  -     Home Sleep Study; Future    Chronic intractable headache, unspecified headache type  -     SUMAtriptan (IMITREX) 25 MG tablet; Take one tablet at onset of headache. May repeat dose one time in 2 hours if headache not relieved.    Acute otitis externa of right ear, unspecified type  -     ciprofloxacin-dexamethasone (CIPRODEX) 0.3-0.1 % otic suspension; Administer 4 drops to the right ear 2 (Two) Times a Day.    Klemme Sleepiness Scale (ESS)  Rate situations associated with sleepiness:      Sitting and reading    0  No chance of dozing (0 points)     Slight chance of dozing (1 point)     Moderate chance of dozing (2 points)     High chance of dozing (3 points)   Watching television    0  No chance of dozing (0 points)     Slight chance of dozing (1 point)     Moderate chance of dozing (2 points)     High chance of dozing (3 points)   Sitting inactive in a public place    0  No chance of dozing (0 points)     Slight chance of dozing (1 point)     Moderate chance of dozing (2 points)     High chance of dozing (3 points)   Sitting for an hour as a passenger in a car      No chance of dozing (0 points)     Slight chance of dozing (1 point)     Moderate chance of dozing (2 points)    3  High chance of dozing (3 points)   Lying down in the afternoon to rest      No chance of dozing (0 points)     Slight chance of dozing (1 point)   2  Moderate chance of dozing (2 points)     High chance of dozing (3 points)   Sitting and talking to another person    0  No chance of dozing (0 points)     Slight chance of dozing (1 point)     Moderate chance of dozing (2 points)     High chance of dozing (3 points)   Sitting quietly after a lunch (no alcohol at lunch)    0  No chance of dozing (0 points)     Slight chance of dozing (1 point)     Moderate chance of dozing (2 points)     High chance of dozing (3 points)   Sitting in a car, stopped for a few minutes due to traffic    0  No chance of dozing (0 points)     Slight chance of dozing (1 point)     Moderate chance of dozing (2 points)     High chance of dozing (3 points)         Total Criteria Point Count:   5    An After Visit Summary was printed and given to the patient at discharge.  Return in about 3 months (around 12/28/2018). Sooner if problems arise.         Vangie SUNG. 9/28/2018

## 2018-10-01 ENCOUNTER — TELEPHONE (OUTPATIENT)
Dept: INTERNAL MEDICINE | Facility: CLINIC | Age: 28
End: 2018-10-01

## 2018-10-01 RX ORDER — OFLOXACIN 3 MG/ML
5 SOLUTION AURICULAR (OTIC) DAILY
Qty: 5 ML | Refills: 0 | Status: SHIPPED | OUTPATIENT
Start: 2018-10-01 | End: 2018-12-28

## 2018-10-01 NOTE — TELEPHONE ENCOUNTER
Received [facility] denial for Ciprodex, see scan.  Please send Ofloxacin Otic solution for her.   MM

## 2018-10-02 NOTE — TELEPHONE ENCOUNTER
I tried calling the patient again, no answer.  I left a message for the patient to return my call.

## 2018-10-02 NOTE — TELEPHONE ENCOUNTER
I tried calling to inform patient of new medication that was ordered, no answer.  I left a message for the patient to return my call.

## 2018-10-03 NOTE — TELEPHONE ENCOUNTER
I called and LM for patient asking if she has gotten the new ear drop. I am going to call the pharmacy.    CHAS

## 2018-10-05 ENCOUNTER — LAB (OUTPATIENT)
Dept: LAB | Facility: HOSPITAL | Age: 28
End: 2018-10-05

## 2018-10-05 DIAGNOSIS — E03.8 OTHER SPECIFIED HYPOTHYROIDISM: ICD-10-CM

## 2018-10-05 LAB
T4 FREE SERPL-MCNC: 2.09 NG/DL (ref 0.78–2.19)
TSH SERPL DL<=0.05 MIU/L-ACNC: <0.02 MIU/ML (ref 0.47–4.68)

## 2018-10-05 PROCEDURE — 36415 COLL VENOUS BLD VENIPUNCTURE: CPT

## 2018-10-05 PROCEDURE — 84443 ASSAY THYROID STIM HORMONE: CPT | Performed by: NURSE PRACTITIONER

## 2018-10-05 PROCEDURE — 84439 ASSAY OF FREE THYROXINE: CPT | Performed by: NURSE PRACTITIONER

## 2018-10-05 PROCEDURE — 84481 FREE ASSAY (FT-3): CPT | Performed by: NURSE PRACTITIONER

## 2018-10-06 LAB — T3FREE SERPL-MCNC: 3.1 PG/ML (ref 2–4.4)

## 2018-10-12 ENCOUNTER — RESULTS ENCOUNTER (OUTPATIENT)
Dept: BARIATRICS/WEIGHT MGMT | Facility: CLINIC | Age: 28
End: 2018-10-12

## 2018-10-12 DIAGNOSIS — E66.01 OBESITY, MORBID, BMI 50 OR HIGHER (HCC): ICD-10-CM

## 2018-10-17 ENCOUNTER — HOSPITAL ENCOUNTER (OUTPATIENT)
Dept: SLEEP MEDICINE | Facility: HOSPITAL | Age: 28
Discharge: HOME | End: 2018-10-17

## 2018-10-17 DIAGNOSIS — G47.00 INSOMNIA, UNSPECIFIED TYPE: ICD-10-CM

## 2018-10-17 DIAGNOSIS — G47.30 SLEEP APNEA, UNSPECIFIED TYPE: ICD-10-CM

## 2018-10-17 PROCEDURE — 95806 SLEEP STUDY UNATT&RESP EFFT: CPT

## 2018-10-18 DIAGNOSIS — I10 ESSENTIAL HYPERTENSION: ICD-10-CM

## 2018-10-18 RX ORDER — LOSARTAN POTASSIUM 50 MG/1
50 TABLET ORAL DAILY
Qty: 30 TABLET | Refills: 3 | Status: SHIPPED | OUTPATIENT
Start: 2018-10-18 | End: 2019-03-28 | Stop reason: SDUPTHER

## 2018-10-25 ENCOUNTER — TELEPHONE (OUTPATIENT)
Dept: BARIATRICS/WEIGHT MGMT | Facility: CLINIC | Age: 28
End: 2018-10-25

## 2018-10-25 NOTE — TELEPHONE ENCOUNTER
[facility] Outstanding Labs     Called left message for the patient as that she still has two outstanding labs from  [facility] Drug test.     Patient returned call she has been nicotine free for 30 days, however she did smoke week last week and the week before.  Told patient to keep her apt on 10-29-18 with ZAKI SUNG as see as to when she suggest that the complete these labs.     oked per patient

## 2018-10-26 DIAGNOSIS — G47.00 INSOMNIA, UNSPECIFIED TYPE: Primary | ICD-10-CM

## 2018-10-29 ENCOUNTER — OFFICE VISIT (OUTPATIENT)
Dept: RETAIL CLINIC | Facility: CLINIC | Age: 28
End: 2018-10-29

## 2018-10-29 VITALS
SYSTOLIC BLOOD PRESSURE: 152 MMHG | OXYGEN SATURATION: 98 % | RESPIRATION RATE: 18 BRPM | TEMPERATURE: 97.7 F | HEART RATE: 90 BPM | DIASTOLIC BLOOD PRESSURE: 94 MMHG

## 2018-10-29 DIAGNOSIS — J03.90 TONSILLITIS: Primary | ICD-10-CM

## 2018-10-29 PROCEDURE — 99213 OFFICE O/P EST LOW 20 MIN: CPT | Performed by: NURSE PRACTITIONER

## 2018-10-29 RX ORDER — AZITHROMYCIN 500 MG/1
500 TABLET, FILM COATED ORAL DAILY
Qty: 5 TABLET | Refills: 0 | Status: SHIPPED | OUTPATIENT
Start: 2018-10-29 | End: 2018-11-03

## 2018-10-29 NOTE — PATIENT INSTRUCTIONS
Tonsillitis  Tonsillitis is an infection of the throat. This infection causes the tonsils to become red, tender, and swollen. Tonsils are tissues in the back of your throat. If bacteria caused your infection, antibiotic medicine will be given to you. Sometimes, symptoms of this infection can be helped with the use of steroid medicine. If your tonsillitis is very bad (severe) and happens often, you may need to get your tonsils removed (tonsillectomy).  Follow these instructions at home:  Medicines  · Take over-the-counter and prescription medicines only as told by your doctor.  · If you were prescribed an antibiotic, take it as told by your doctor. Do not stop taking the antibiotic even if you start to feel better.  Eating and drinking  · Drink enough fluid to keep your pee (urine) clear or pale yellow.  · While your throat is sore, eat soft or liquid foods like:  ? Soup.  ? Sherbert.  ? Instant breakfast drinks.  · Drink warm fluids.  · Eat frozen ice pops.  General instructions  · Rest as much as possible and get plenty of sleep.  · Gargle with a salt-water mixture 3-4 times a day or as needed. To make a salt-water mixture, completely dissolve ½-1 tsp of salt in 1 cup of warm water.  · Wash your hands often with soap and water. If there is no soap and water, use hand .  · Do not share cups, bottles, or other utensils until your symptoms are gone.  · Do not smoke. If you need help quitting, ask your doctor.  · Keep all follow-up visits as told by your doctor. This is important.  Contact a doctor if:  · You have large, tender lumps in your neck.  · You have a fever that does not go away after 2-3 days.  · You have a rash.  · You cough up green, yellow-brown, or bloody fluid.  · You cannot swallow liquids or food for 24 hours.  · Only one of your tonsils is swollen.  Get help right away if:  · You have any new symptoms such as:  ? Vomiting  ? Very bad headache  ? Stiff neck  ? Chest pain  ? Trouble breathing  or swallowing  · You have very bad throat pain and also have drooling or voice changes.  · You have very bad pain that is not helped by medicine.  · You cannot fully open your mouth.  · You have redness, swelling, or severe pain anywhere in your neck.  Summary  · Tonsillitis causes your tonsils to be red, tender, and swollen.  · While your throat is sore eat soft or liquid foods.  · Gargle with a salt-water mixture 3-4 times a day or as needed.  · Do not share cups, bottles, or other utensils until your symptoms are gone.  This information is not intended to replace advice given to you by your health care provider. Make sure you discuss any questions you have with your health care provider.  Document Released: 06/05/2009 Document Revised: 05/25/2017 Document Reviewed: 06/06/2014  [employer] Patient Education © 2017 [employer]

## 2018-10-29 NOTE — PROGRESS NOTES
Subjective     Magda Palm is a 28 y.o. male who presents to the clinic with: c/o having tonsillitis or strep throat. She says her child was treated for strep throat last week and now she has a very sore throat and swollen tonsils. She denies any recent antibiotic use.      Sore Throat    This is a new problem. The current episode started in the past 7 days. The problem has been rapidly worsening. Neither side of throat is experiencing more pain than the other. There has been no fever. The pain is moderate. Associated symptoms include a hoarse voice, swollen glands and trouble swallowing. Pertinent negatives include no drooling, headaches or shortness of breath. She has had exposure to strep. She has tried NSAIDs and acetaminophen for the symptoms. The treatment provided mild relief.          The following portions of the patient's history were reviewed and updated as appropriate: allergies, current medications, past family history, past medical history, past social history, past surgical history and problem list.      Review of Systems   Constitutional: Positive for fatigue. Negative for chills and fever.   HENT: Positive for hoarse voice, sore throat and trouble swallowing. Negative for drooling and facial swelling.    Respiratory: Negative for shortness of breath.    Gastrointestinal: Negative.    Musculoskeletal: Negative.    Skin: Negative.    Neurological: Negative for headaches.         Objective   Physical Exam   Constitutional: She is oriented to person, place, and time. She appears well-nourished. No distress.   HENT:   Head: Normocephalic.   Right Ear: Tympanic membrane and ear canal normal.   Left Ear: Tympanic membrane and ear canal normal.   Nose: Nose normal.   Mouth/Throat: Uvula is midline. Oropharyngeal exudate, posterior oropharyngeal edema and posterior oropharyngeal erythema present. No tonsillar abscesses. Tonsils are 2+ on the right. Tonsils are 2+ on the left. Tonsillar exudate.    Cardiovascular: Normal rate and regular rhythm.    Pulmonary/Chest: Effort normal and breath sounds normal.   Musculoskeletal: Normal range of motion.   Lymphadenopathy:        Head (right side): Tonsillar adenopathy present.        Head (left side): Tonsillar adenopathy present.   Neurological: She is alert and oriented to person, place, and time.   Skin: Skin is warm and dry.   Psychiatric: She has a normal mood and affect. Her behavior is normal.   Vitals reviewed.        Assessment/Plan   Magda was seen today for sore throat.    Diagnoses and all orders for this visit:    Tonsillitis    Other orders  -     azithromycin (ZITHROMAX) 500 MG tablet; Take 1 tablet by mouth Daily for 5 days.      Patient declined a strep test. Will treat for strep throat based on recent known exposure, presentation of symptoms, and exam findings. Follow treatment plan as discussed. Follow up as needed. Patient verbalized understanding.

## 2018-11-05 ENCOUNTER — OFFICE VISIT (OUTPATIENT)
Dept: BARIATRICS/WEIGHT MGMT | Facility: CLINIC | Age: 28
End: 2018-11-05

## 2018-11-05 VITALS
BODY MASS INDEX: 45.99 KG/M2 | TEMPERATURE: 97.8 F | HEART RATE: 79 BPM | HEIGHT: 67 IN | DIASTOLIC BLOOD PRESSURE: 79 MMHG | WEIGHT: 293 LBS | OXYGEN SATURATION: 92 % | SYSTOLIC BLOOD PRESSURE: 155 MMHG

## 2018-11-05 PROBLEM — IMO0001 CLASS 3 OBESITY DUE TO EXCESS CALORIES WITHOUT SERIOUS COMORBIDITY WITH BODY MASS INDEX (BMI) OF 50.0 TO 59.9 IN ADULT: Status: RESOLVED | Noted: 2018-05-14 | Resolved: 2018-11-05

## 2018-11-05 PROCEDURE — 99212 OFFICE O/P EST SF 10 MIN: CPT | Performed by: SURGERY

## 2018-11-05 NOTE — PROGRESS NOTES
"Subjective   Magda Palm is a 28 y.o. female.     Magda is here for follow-up and continued medical management of her morbid obesity.  She is currently on a regular diet.  Magda previously was to apply dietary changes such as increasing her meals to 4 meals a day, measuring her meals regularly, decrease in the current intake and increasing her protein intake.  She is also to increase her fluid intake..  She admits to only with food choices and has incorporated more carbs/sugar then recommended.  As a result she gained weight since her last visit.    Review Of Systems:  General ROS: positive for  - sleep disturbance  Gastrointestinal ROS: no abdominal pain, change in bowel habits, or black or bloody stools    The following portions of the patient's history were reviewed and updated as appropriate: allergies, current medications, past medical history, past surgical history and problem list.    Objective   /79 (BP Location: Right arm, Patient Position: Sitting, Cuff Size: Adult)   Pulse 79   Temp 97.8 °F (36.6 °C)   Ht 168.9 cm (66.5\")   Wt (!) 162 kg (357 lb 12.8 oz)   SpO2 92%   BMI 56.89 kg/m²     General Appearance:  awake, alert, oriented, in no acute distress  Abdomen:  Soft, non-tender, normal bowel sounds; no bruits, organomegaly or masses.  Abnormal shape: obese    Assessment/Plan     Encounter Diagnoses   Name Primary?   • Body mass index (BMI) of 50-59.9 in adult (CMS/[facility]) Yes       Magda Palm was seen today for follow-up, obesity, nutrition counseling and weight loss.  she has gained weight since her last visit.  Today we discussed healthy changes in lifestyle, diet, and exercise. Dietician consultation obtained.  Magda Palm had received handouts to her explaining the recommendation on portion sizes/appetite control/reading nutrition labels.   Intensive behavioral therapy for obesity was done today as well.   Goals for this month are: She has been provided a structured " dietary regimen based off of her behavior.  I discussed with the patient the etiology of the disease of obesity and the potential comorbid conditions associated with this disease.  She was instructed to follow the dietary regimen and follow-up with our program in 1 month's time with any additional questions as they may arise during this time.  We emphasized on focusing on proteins and meals high in fiber as well as adequate hydration that exceed 64 ounces of water daily.  Was placed on the 4 meals a day plan.  She stated follow-up in one month questions.    Follow up in one month for a weight recheck

## 2018-11-09 ENCOUNTER — RESULTS ENCOUNTER (OUTPATIENT)
Dept: BARIATRICS/WEIGHT MGMT | Facility: CLINIC | Age: 28
End: 2018-11-09

## 2018-11-09 ENCOUNTER — LAB (OUTPATIENT)
Dept: LAB | Facility: HOSPITAL | Age: 28
End: 2018-11-09
Attending: SURGERY

## 2018-11-09 ENCOUNTER — TELEPHONE (OUTPATIENT)
Dept: BARIATRICS/WEIGHT MGMT | Facility: CLINIC | Age: 28
End: 2018-11-09

## 2018-11-09 DIAGNOSIS — E66.01 OBESITY, MORBID, BMI 50 OR HIGHER (HCC): ICD-10-CM

## 2018-11-09 DIAGNOSIS — K21.9 GASTROESOPHAGEAL REFLUX DISEASE WITHOUT ESOPHAGITIS: ICD-10-CM

## 2018-11-09 PROCEDURE — G0480 DRUG TEST DEF 1-7 CLASSES: HCPCS | Performed by: SURGERY

## 2018-11-09 PROCEDURE — 80307 DRUG TEST PRSMV CHEM ANLYZR: CPT | Performed by: SURGERY

## 2018-11-09 PROCEDURE — 36415 COLL VENOUS BLD VENIPUNCTURE: CPT

## 2018-11-09 PROCEDURE — G0480 DRUG TEST DEF 1-7 CLASSES: HCPCS

## 2018-11-09 NOTE — TELEPHONE ENCOUNTER
[facility] Outstanding Lab Orders [facility]       Called spoke with patient she quit smoking in September.  She currently has [facility] Outstanding Lab Orders [facility].  Patient stated that she will come later today or tomorrow and have these test done.

## 2018-11-14 LAB
COTININE UR-MCNC: 3.9 NG/ML
NICOTINE SERPL-MCNC: NORMAL NG/ML

## 2018-11-17 LAB
11OH-THC SPEC-MCNC: NEGATIVE NG/ML
AMPHETAMINES SERPL QL SCN: NEGATIVE NG/ML
BARBITURATES SERPL QL SCN: NEGATIVE UG/ML
BENZODIAZ BLD QL: NEGATIVE NG/ML
CANNABIDIOL SERPLBLD CFM-MCNC: NEGATIVE NG/ML
CANNABINOIDS BLD QL SCN: ABNORMAL NG/ML
CANNABINOIDS SPEC QL CFM: POSITIVE
CANNABINOL: NEGATIVE NG/ML
CARBOXYTHC SPEC-MCNC: 10.7 NG/ML
COCAINE+BZE SERPLBLD QL SCN: NEGATIVE NG/ML
OPIATES, IA: NEGATIVE NG/ML
OXYCODONES, IA: NEGATIVE NG/ML
PCP SERPL QL SCN: NEGATIVE NG/ML
THC SERPLBLD CFM-MCNC: NEGATIVE NG/ML

## 2018-11-19 ENCOUNTER — HOSPITAL ENCOUNTER (OUTPATIENT)
Dept: SLEEP MEDICINE | Facility: HOSPITAL | Age: 28
Discharge: HOME | End: 2018-11-19
Admitting: NURSE PRACTITIONER

## 2018-11-19 DIAGNOSIS — G47.00 INSOMNIA, UNSPECIFIED TYPE: ICD-10-CM

## 2018-11-19 PROCEDURE — 95800 SLP STDY UNATTENDED: CPT | Performed by: PSYCHIATRY & NEUROLOGY

## 2018-11-19 PROCEDURE — 95800 SLP STDY UNATTENDED: CPT

## 2018-11-30 ENCOUNTER — OFFICE VISIT (OUTPATIENT)
Dept: PSYCHIATRY | Age: 28
End: 2018-11-30
Payer: MEDICAID

## 2018-11-30 VITALS
HEIGHT: 68 IN | SYSTOLIC BLOOD PRESSURE: 143 MMHG | WEIGHT: 293 LBS | HEART RATE: 73 BPM | BODY MASS INDEX: 44.41 KG/M2 | OXYGEN SATURATION: 96 % | DIASTOLIC BLOOD PRESSURE: 85 MMHG

## 2018-11-30 DIAGNOSIS — R45.1 AGITATION: ICD-10-CM

## 2018-11-30 DIAGNOSIS — F33.1 MODERATE EPISODE OF RECURRENT MAJOR DEPRESSIVE DISORDER (HCC): Primary | ICD-10-CM

## 2018-11-30 DIAGNOSIS — E66.01 CLASS 3 SEVERE OBESITY DUE TO EXCESS CALORIES WITHOUT SERIOUS COMORBIDITY WITH BODY MASS INDEX (BMI) OF 50.0 TO 59.9 IN ADULT (HCC): ICD-10-CM

## 2018-11-30 DIAGNOSIS — F41.9 ANXIETY: ICD-10-CM

## 2018-11-30 PROCEDURE — 99205 OFFICE O/P NEW HI 60 MIN: CPT | Performed by: NURSE PRACTITIONER

## 2018-11-30 PROCEDURE — G8427 DOCREV CUR MEDS BY ELIG CLIN: HCPCS | Performed by: NURSE PRACTITIONER

## 2018-11-30 PROCEDURE — G8484 FLU IMMUNIZE NO ADMIN: HCPCS | Performed by: NURSE PRACTITIONER

## 2018-11-30 PROCEDURE — G8417 CALC BMI ABV UP PARAM F/U: HCPCS | Performed by: NURSE PRACTITIONER

## 2018-11-30 PROCEDURE — 1036F TOBACCO NON-USER: CPT | Performed by: NURSE PRACTITIONER

## 2018-11-30 RX ORDER — ZOLPIDEM TARTRATE 5 MG/1
5 TABLET ORAL
COMMUNITY
Start: 2018-09-07

## 2018-11-30 RX ORDER — TRAZODONE HYDROCHLORIDE 50 MG/1
50 TABLET ORAL NIGHTLY PRN
Qty: 30 TABLET | Refills: 5 | Status: SHIPPED | OUTPATIENT
Start: 2018-11-30

## 2018-11-30 RX ORDER — OFLOXACIN 3 MG/ML
SOLUTION AURICULAR (OTIC)
COMMUNITY
Start: 2018-10-01

## 2018-11-30 RX ORDER — FLUOXETINE 10 MG/1
20 CAPSULE ORAL DAILY
Qty: 30 CAPSULE | Refills: 3 | Status: SHIPPED | OUTPATIENT
Start: 2018-11-30 | End: 2018-11-30 | Stop reason: SDUPTHER

## 2018-11-30 RX ORDER — ACETAMINOPHEN, ASPIRIN AND CAFFEINE 250; 250; 65 MG/1; MG/1; MG/1
2 TABLET, FILM COATED ORAL
COMMUNITY

## 2018-11-30 RX ORDER — ALBUTEROL SULFATE 90 UG/1
AEROSOL, METERED RESPIRATORY (INHALATION)
COMMUNITY
Start: 2018-07-16

## 2018-11-30 RX ORDER — PANTOPRAZOLE SODIUM 40 MG/1
40 TABLET, DELAYED RELEASE ORAL
COMMUNITY
Start: 2018-05-14

## 2018-11-30 RX ORDER — SUMATRIPTAN 25 MG/1
TABLET, FILM COATED ORAL
COMMUNITY
Start: 2018-09-28

## 2018-11-30 RX ORDER — LOSARTAN POTASSIUM 50 MG/1
50 TABLET ORAL
COMMUNITY
Start: 2018-10-18

## 2018-11-30 RX ORDER — LEVOTHYROXINE SODIUM 0.2 MG/1
200 TABLET ORAL
COMMUNITY
Start: 2018-05-14

## 2018-11-30 RX ORDER — FLUTICASONE PROPIONATE 50 MCG
2 SPRAY, SUSPENSION (ML) NASAL
COMMUNITY
Start: 2018-07-25

## 2018-11-30 RX ORDER — FLUOXETINE HYDROCHLORIDE 20 MG/1
20 CAPSULE ORAL DAILY
Qty: 30 CAPSULE | Refills: 3 | Status: SHIPPED | OUTPATIENT
Start: 2018-11-30

## 2018-11-30 RX ORDER — TRAZODONE HYDROCHLORIDE 50 MG/1
50 TABLET ORAL NIGHTLY
Qty: 30 TABLET | Refills: 5 | Status: SHIPPED | OUTPATIENT
Start: 2018-11-30

## 2018-11-30 RX ORDER — FLUOXETINE 10 MG/1
10 CAPSULE ORAL
COMMUNITY
Start: 2018-07-25 | End: 2018-11-30 | Stop reason: DRUGHIGH

## 2018-11-30 ASSESSMENT — ENCOUNTER SYMPTOMS
ALLERGIC/IMMUNOLOGIC NEGATIVE: 1
RESPIRATORY NEGATIVE: 1
GASTROINTESTINAL NEGATIVE: 1
EYES NEGATIVE: 1

## 2018-12-04 ENCOUNTER — APPOINTMENT (OUTPATIENT)
Dept: BARIATRICS/WEIGHT MGMT | Facility: HOSPITAL | Age: 28
End: 2018-12-04

## 2018-12-07 ENCOUNTER — RESULTS ENCOUNTER (OUTPATIENT)
Dept: BARIATRICS/WEIGHT MGMT | Facility: CLINIC | Age: 28
End: 2018-12-07

## 2018-12-07 DIAGNOSIS — E66.01 OBESITY, MORBID, BMI 50 OR HIGHER (HCC): ICD-10-CM

## 2018-12-10 ENCOUNTER — APPOINTMENT (OUTPATIENT)
Dept: BARIATRICS/WEIGHT MGMT | Facility: HOSPITAL | Age: 28
End: 2018-12-10

## 2018-12-23 DIAGNOSIS — K21.9 GASTROESOPHAGEAL REFLUX DISEASE WITHOUT ESOPHAGITIS: ICD-10-CM

## 2018-12-26 RX ORDER — PANTOPRAZOLE SODIUM 40 MG/1
40 TABLET, DELAYED RELEASE ORAL DAILY
Qty: 30 TABLET | Refills: 0 | OUTPATIENT
Start: 2018-12-26

## 2018-12-28 ENCOUNTER — OFFICE VISIT (OUTPATIENT)
Dept: INTERNAL MEDICINE | Facility: CLINIC | Age: 28
End: 2018-12-28

## 2018-12-28 VITALS
OXYGEN SATURATION: 97 % | SYSTOLIC BLOOD PRESSURE: 145 MMHG | RESPIRATION RATE: 16 BRPM | BODY MASS INDEX: 45.99 KG/M2 | DIASTOLIC BLOOD PRESSURE: 90 MMHG | TEMPERATURE: 98.4 F | HEART RATE: 76 BPM | WEIGHT: 293 LBS | HEIGHT: 67 IN

## 2018-12-28 DIAGNOSIS — R06.83 SNORING: ICD-10-CM

## 2018-12-28 DIAGNOSIS — F17.210 CIGARETTE SMOKER: ICD-10-CM

## 2018-12-28 DIAGNOSIS — H92.01 RIGHT EAR PAIN: ICD-10-CM

## 2018-12-28 DIAGNOSIS — Z71.6 ENCOUNTER FOR SMOKING CESSATION COUNSELING: Primary | ICD-10-CM

## 2018-12-28 DIAGNOSIS — H91.91 DECREASED HEARING OF RIGHT EAR: ICD-10-CM

## 2018-12-28 PROCEDURE — 99214 OFFICE O/P EST MOD 30 MIN: CPT | Performed by: NURSE PRACTITIONER

## 2018-12-28 RX ORDER — FLUOXETINE HYDROCHLORIDE 20 MG/1
20 CAPSULE ORAL DAILY
COMMUNITY
Start: 2018-11-30 | End: 2019-03-28 | Stop reason: SDUPTHER

## 2018-12-28 RX ORDER — TRAZODONE HYDROCHLORIDE 50 MG/1
50 TABLET ORAL NIGHTLY
COMMUNITY
Start: 2018-11-30

## 2019-01-03 DIAGNOSIS — K21.9 GASTROESOPHAGEAL REFLUX DISEASE WITHOUT ESOPHAGITIS: ICD-10-CM

## 2019-01-03 RX ORDER — PANTOPRAZOLE SODIUM 40 MG/1
40 TABLET, DELAYED RELEASE ORAL DAILY
Qty: 30 TABLET | Refills: 5 | Status: SHIPPED | OUTPATIENT
Start: 2019-01-03

## 2019-01-04 ENCOUNTER — RESULTS ENCOUNTER (OUTPATIENT)
Dept: BARIATRICS/WEIGHT MGMT | Facility: CLINIC | Age: 29
End: 2019-01-04

## 2019-01-04 DIAGNOSIS — E66.01 OBESITY, MORBID, BMI 50 OR HIGHER (HCC): ICD-10-CM

## 2019-01-18 DIAGNOSIS — E03.8 OTHER SPECIFIED HYPOTHYROIDISM: ICD-10-CM

## 2019-01-18 RX ORDER — LEVOTHYROXINE SODIUM 0.2 MG/1
200 TABLET ORAL DAILY
Qty: 30 TABLET | Refills: 5 | OUTPATIENT
Start: 2019-01-18

## 2019-01-29 DIAGNOSIS — E03.8 OTHER SPECIFIED HYPOTHYROIDISM: ICD-10-CM

## 2019-01-30 RX ORDER — LEVOTHYROXINE SODIUM 0.2 MG/1
200 TABLET ORAL DAILY
Qty: 30 TABLET | Refills: 0 | OUTPATIENT
Start: 2019-01-30

## 2019-01-31 DIAGNOSIS — F17.210 CIGARETTE SMOKER: ICD-10-CM

## 2019-01-31 DIAGNOSIS — Z71.6 ENCOUNTER FOR SMOKING CESSATION COUNSELING: ICD-10-CM

## 2019-02-01 ENCOUNTER — RESULTS ENCOUNTER (OUTPATIENT)
Dept: BARIATRICS/WEIGHT MGMT | Facility: CLINIC | Age: 29
End: 2019-02-01

## 2019-02-01 DIAGNOSIS — E66.01 OBESITY, MORBID, BMI 50 OR HIGHER (HCC): ICD-10-CM

## 2019-03-01 ENCOUNTER — RESULTS ENCOUNTER (OUTPATIENT)
Dept: BARIATRICS/WEIGHT MGMT | Facility: CLINIC | Age: 29
End: 2019-03-01

## 2019-03-01 DIAGNOSIS — E66.01 OBESITY, MORBID, BMI 50 OR HIGHER (HCC): ICD-10-CM

## 2019-03-28 ENCOUNTER — LAB (OUTPATIENT)
Dept: LAB | Facility: HOSPITAL | Age: 29
End: 2019-03-28

## 2019-03-28 ENCOUNTER — OFFICE VISIT (OUTPATIENT)
Dept: INTERNAL MEDICINE | Facility: CLINIC | Age: 29
End: 2019-03-28

## 2019-03-28 ENCOUNTER — TELEPHONE (OUTPATIENT)
Dept: INTERNAL MEDICINE | Facility: CLINIC | Age: 29
End: 2019-03-28

## 2019-03-28 VITALS
HEIGHT: 67 IN | BODY MASS INDEX: 45.99 KG/M2 | OXYGEN SATURATION: 99 % | HEART RATE: 89 BPM | TEMPERATURE: 98.6 F | RESPIRATION RATE: 16 BRPM | DIASTOLIC BLOOD PRESSURE: 91 MMHG | WEIGHT: 293 LBS | SYSTOLIC BLOOD PRESSURE: 150 MMHG

## 2019-03-28 DIAGNOSIS — I10 ESSENTIAL HYPERTENSION: Primary | ICD-10-CM

## 2019-03-28 DIAGNOSIS — J02.9 ACUTE PHARYNGITIS, UNSPECIFIED ETIOLOGY: ICD-10-CM

## 2019-03-28 DIAGNOSIS — E03.8 OTHER SPECIFIED HYPOTHYROIDISM: ICD-10-CM

## 2019-03-28 DIAGNOSIS — E03.8 OTHER SPECIFIED HYPOTHYROIDISM: Primary | ICD-10-CM

## 2019-03-28 DIAGNOSIS — F32.1 MODERATE SINGLE CURRENT EPISODE OF MAJOR DEPRESSIVE DISORDER (HCC): ICD-10-CM

## 2019-03-28 DIAGNOSIS — F17.210 CIGARETTE SMOKER: ICD-10-CM

## 2019-03-28 LAB
S PYO AG THROAT QL: NEGATIVE
TSH SERPL DL<=0.05 MIU/L-ACNC: 7.59 MIU/ML (ref 0.47–4.68)

## 2019-03-28 PROCEDURE — 84443 ASSAY THYROID STIM HORMONE: CPT | Performed by: NURSE PRACTITIONER

## 2019-03-28 PROCEDURE — 87081 CULTURE SCREEN ONLY: CPT | Performed by: NURSE PRACTITIONER

## 2019-03-28 PROCEDURE — 99214 OFFICE O/P EST MOD 30 MIN: CPT | Performed by: NURSE PRACTITIONER

## 2019-03-28 PROCEDURE — 87880 STREP A ASSAY W/OPTIC: CPT | Performed by: NURSE PRACTITIONER

## 2019-03-28 PROCEDURE — 36415 COLL VENOUS BLD VENIPUNCTURE: CPT

## 2019-03-28 RX ORDER — LEVOTHYROXINE SODIUM 0.2 MG/1
200 TABLET ORAL DAILY
Qty: 30 TABLET | Refills: 1 | Status: SHIPPED | OUTPATIENT
Start: 2019-03-28 | End: 2019-06-10 | Stop reason: SDUPTHER

## 2019-03-28 RX ORDER — FLUOXETINE HYDROCHLORIDE 40 MG/1
40 CAPSULE ORAL DAILY
Qty: 30 CAPSULE | Refills: 3 | Status: SHIPPED | OUTPATIENT
Start: 2019-03-28

## 2019-03-28 RX ORDER — LOSARTAN POTASSIUM 100 MG/1
100 TABLET ORAL DAILY
Qty: 30 TABLET | Refills: 3 | Status: SHIPPED | OUTPATIENT
Start: 2019-03-28

## 2019-03-28 NOTE — TELEPHONE ENCOUNTER
Patient informed of lab results and that levothyroxine will be refilled today.  Patient asked about her strep test results and was informed rapid strep test was negative but we are still awaiting results of culture.

## 2019-03-28 NOTE — PROGRESS NOTES
CC: f/u hypertension and depression    History:  Magda Palm is a 28 y.o. female who presents today for follow-up for evaluation of the above:  Patient presents today for follow up on depression and hypertension. She reports that she has been recently sick with URI and possible strep throat. Her daughter was diagnosed with strep. She does report a sore throat but does not have a fever at this time. She does have congestion and hearing loss in her left ear.  She has not kept her routine follow-ups with ENT.  She plans to reschedule an appointment with them  She reports that she has been seeing worsening of her depression and mood. She doesn't like to leave her house and has more frequent panic attacks. She was previously seeing [facility] and was started on prozac but admits she has not be consistent with her follow ups.   She reports she has been compliant with her b/p medications without adverse side effects.   She has been unsuccessful in smoking cessation. She is currently seeing Dr. Rueda in bariatrics and was working toward a goal of weight loss.  He admits that she has not kept her appointments in their office due to her depression.  Patient also admits to being noncompliant with her Synthroid dose.  She states that she did not have her TSH checked at her last visit and therefore her doses have not been reordered.    ROS:  Review of Systems   Constitutional: Positive for unexpected weight change. Negative for fatigue.   HENT: Positive for congestion, ear pain and sore throat.    Eyes: Negative.    Respiratory: Negative.    Cardiovascular: Negative.    Gastrointestinal: Negative for abdominal pain, constipation and diarrhea.   Endocrine: Negative.    Genitourinary: Negative for difficulty urinating, dyspareunia, genital sores, menstrual problem, pelvic pain, vaginal bleeding, vaginal discharge and vaginal pain.   Musculoskeletal: Negative.    Skin: Negative.    Neurological: Negative.   "  Psychiatric/Behavioral: Positive for dysphoric mood. The patient is nervous/anxious.        Ms. Palm  reports that she quit smoking about 6 months ago. Her smoking use included cigarettes. She started smoking about 16 years ago. She has a 2.50 pack-year smoking history. She has never used smokeless tobacco. She reports that she uses drugs. Drug: Marijuana. She reports that she does not drink alcohol.      Current Outpatient Medications:   •  FLUoxetine (PROzac) 40 MG capsule, Take 1 capsule by mouth Daily., Disp: 30 capsule, Rfl: 3  •  losartan (COZAAR) 100 MG tablet, Take 1 tablet by mouth Daily., Disp: 30 tablet, Rfl: 3  •  pantoprazole (PROTONIX) 40 MG EC tablet, Take 1 tablet by mouth Daily., Disp: 30 tablet, Rfl: 5  •  traZODone (DESYREL) 50 MG tablet, Take 50 mg by mouth Every Night., Disp: , Rfl:   •  VENTOLIN  (90 Base) MCG/ACT inhaler, INHALE 2 PUFFS BY MOUTH EVERY 4 HOURS AS NEEDED FOR WHEEZING, Disp: 18 g, Rfl: 0  •  Cholecalciferol (VITAMIN D PO), Take 2,000 Units by mouth Daily., Disp: , Rfl:   •  fluticasone (FLONASE) 50 MCG/ACT nasal spray, 2 sprays into the nostril(s) as directed by provider Daily., Disp: 15.8 mL, Rfl: 3  •  levothyroxine (SYNTHROID) 200 MCG tablet, Take 1 tablet by mouth Daily., Disp: 30 tablet, Rfl: 6      OBJECTIVE:  /91 (BP Location: Left arm, Patient Position: Sitting)   Pulse 89   Temp 98.6 °F (37 °C) (Oral)   Resp 16   Ht 168.9 cm (66.5\")   Wt (!) 161 kg (356 lb)   SpO2 99%   BMI 56.60 kg/m²    Physical Exam   Constitutional: She is oriented to person, place, and time. She appears well-developed and well-nourished.   HENT:   Head: Normocephalic and atraumatic.   Eyes: EOM are normal. Pupils are equal, round, and reactive to light.   Neck: Normal range of motion. Neck supple.   Cardiovascular: Normal rate, regular rhythm and normal heart sounds.   Pulmonary/Chest: Effort normal and breath sounds normal.   Abdominal: Soft. Bowel sounds are normal. "   Musculoskeletal: Normal range of motion.   Neurological: She is alert and oriented to person, place, and time.   Skin: Skin is warm and dry.   Psychiatric: She has a normal mood and affect.   Vitals reviewed.      Assessment/Plan    Diagnoses and all orders for this visit:    Essential hypertension  -     losartan (COZAAR) 100 MG tablet; Take 1 tablet by mouth Daily.  Poorly controlled, BP goal for age is <140/90 per JNC 8 guidelines will increase her Cozaar.    Acute pharyngitis, unspecified etiology  -     Rapid Strep A Screen - Swab, Throat; Future    Cigarette smoker  Patient was counseled on and understood the many dangers of continuing to use tobacco. Despite this patient states quitting is not an immediate priority at this time. I reminded the patient that if quitting becomes an increased priority to contact us for help with quitting including pharmacologic & nonpharmacologic options or any additional resources.    Moderate single current episode of major depressive disorder (CMS/HCC)  -     FLUoxetine (PROzac) 40 MG capsule; Take 1 capsule by mouth Daily.  We will increase her Prozac to 40 mg daily..  She is encouraged to follow-up with her counselor and psychiatrist at [facility].    Other specified hypothyroidism  TSH pending      An After Visit Summary was printed and given to the patient at discharge.  Return in about 3 months (around 6/28/2019). Sooner if problems arise.         Vangie SUNG. 3/28/2019

## 2019-03-28 NOTE — TELEPHONE ENCOUNTER
----- Message from ANA LILIA Xiong sent at 3/28/2019  3:04 PM CDT -----  Patient saw Vangie today.  Please advise that her thryoid medication has been reordered. She will need to remain compliant with medication and return to lab in 6 weeks for recheck.

## 2019-03-29 ENCOUNTER — RESULTS ENCOUNTER (OUTPATIENT)
Dept: BARIATRICS/WEIGHT MGMT | Facility: CLINIC | Age: 29
End: 2019-03-29

## 2019-03-29 DIAGNOSIS — E66.01 OBESITY, MORBID, BMI 50 OR HIGHER (HCC): ICD-10-CM

## 2019-03-30 LAB — BACTERIA SPEC AEROBE CULT: NORMAL

## 2019-05-24 ENCOUNTER — LAB (OUTPATIENT)
Dept: LAB | Facility: HOSPITAL | Age: 29
End: 2019-05-24

## 2019-05-24 ENCOUNTER — OFFICE VISIT (OUTPATIENT)
Dept: INTERNAL MEDICINE | Facility: CLINIC | Age: 29
End: 2019-05-24

## 2019-05-24 ENCOUNTER — HOSPITAL ENCOUNTER (OUTPATIENT)
Dept: GENERAL RADIOLOGY | Facility: HOSPITAL | Age: 29
Discharge: HOME | End: 2019-05-24
Admitting: NURSE PRACTITIONER

## 2019-05-24 VITALS
OXYGEN SATURATION: 97 % | RESPIRATION RATE: 16 BRPM | BODY MASS INDEX: 45.99 KG/M2 | HEART RATE: 81 BPM | DIASTOLIC BLOOD PRESSURE: 86 MMHG | HEIGHT: 67 IN | TEMPERATURE: 98.6 F | WEIGHT: 293 LBS | SYSTOLIC BLOOD PRESSURE: 130 MMHG

## 2019-05-24 DIAGNOSIS — R68.84 JAW PAIN: ICD-10-CM

## 2019-05-24 DIAGNOSIS — E03.8 OTHER SPECIFIED HYPOTHYROIDISM: ICD-10-CM

## 2019-05-24 DIAGNOSIS — R68.84 JAW PAIN, NON-TMJ: ICD-10-CM

## 2019-05-24 DIAGNOSIS — I10 ESSENTIAL HYPERTENSION: ICD-10-CM

## 2019-05-24 DIAGNOSIS — F32.1 MODERATE SINGLE CURRENT EPISODE OF MAJOR DEPRESSIVE DISORDER (HCC): ICD-10-CM

## 2019-05-24 DIAGNOSIS — E66.01 CLASS 3 SEVERE OBESITY DUE TO EXCESS CALORIES WITH SERIOUS COMORBIDITY AND BODY MASS INDEX (BMI) OF 50.0 TO 59.9 IN ADULT (HCC): ICD-10-CM

## 2019-05-24 DIAGNOSIS — R68.84 JAW PAIN: Primary | ICD-10-CM

## 2019-05-24 DIAGNOSIS — F32.1 MODERATE SINGLE CURRENT EPISODE OF MAJOR DEPRESSIVE DISORDER (HCC): Primary | ICD-10-CM

## 2019-05-24 LAB — TSH SERPL DL<=0.05 MIU/L-ACNC: 1.74 MIU/ML (ref 0.47–4.68)

## 2019-05-24 PROCEDURE — 36415 COLL VENOUS BLD VENIPUNCTURE: CPT

## 2019-05-24 PROCEDURE — 84443 ASSAY THYROID STIM HORMONE: CPT | Performed by: NURSE PRACTITIONER

## 2019-05-24 PROCEDURE — 99214 OFFICE O/P EST MOD 30 MIN: CPT | Performed by: NURSE PRACTITIONER

## 2019-05-24 PROCEDURE — 70110 X-RAY EXAM OF JAW 4/> VIEWS: CPT

## 2019-05-24 RX ORDER — ARIPIPRAZOLE 2 MG/1
2 TABLET ORAL DAILY
Qty: 30 TABLET | Refills: 3 | Status: SHIPPED | OUTPATIENT
Start: 2019-05-24

## 2019-05-24 RX ORDER — CYCLOBENZAPRINE HCL 10 MG
10 TABLET ORAL 3 TIMES DAILY PRN
Qty: 30 TABLET | Refills: 0 | Status: SHIPPED | OUTPATIENT
Start: 2019-05-24

## 2019-05-24 NOTE — PROGRESS NOTES
"CC: jaw pain, depression    History:  Magda Palm is a 28 y.o. female who presents today for follow-up for evaluation of the above:  Patient presents today with c/o severe jaw pain. It is located on the right. She reports that approx 2 weeks ago she playing with her son making \"funny faces\" and heard a loud pop and felt a crack like biting into a carrot. She has had pain with ROM of her jaw, difficulty talking for chewing, and a headache since that time. She has tried OTC pain relief with no improvement.  She also c/o worsening depression. She reports that at times she doesn't want to leave her house. She denies any SI or HI. She is currently taking her prozac which she states has worked well for her in the past. She does admit that she has not returned to see her psychiatrist as she was previously recommended at our last visit.        ROS:  Review of Systems   Constitutional: Negative for fatigue and unexpected weight change.   HENT:        Right jaw pain   Eyes: Negative.    Respiratory: Negative.    Cardiovascular: Negative.    Gastrointestinal: Negative for abdominal pain, constipation and diarrhea.   Endocrine: Negative.    Genitourinary: Negative for difficulty urinating, dyspareunia, genital sores, menstrual problem, pelvic pain, vaginal bleeding, vaginal discharge and vaginal pain.   Musculoskeletal: Negative.    Skin: Negative.    Neurological: Positive for headaches.   Psychiatric/Behavioral: Positive for dysphoric mood.       Ms. Palm  reports that she has been smoking cigarettes.  She started smoking about 17 years ago. She has a 2.50 pack-year smoking history. She has never used smokeless tobacco. She reports that she uses drugs. Drug: Marijuana. She reports that she does not drink alcohol.      Current Outpatient Medications:   •  FLUoxetine (PROzac) 40 MG capsule, Take 1 capsule by mouth Daily., Disp: 30 capsule, Rfl: 3  •  levothyroxine (SYNTHROID) 200 MCG tablet, Take 1 tablet by mouth " "Daily., Disp: 30 tablet, Rfl: 1  •  losartan (COZAAR) 100 MG tablet, Take 1 tablet by mouth Daily., Disp: 30 tablet, Rfl: 3  •  pantoprazole (PROTONIX) 40 MG EC tablet, Take 1 tablet by mouth Daily., Disp: 30 tablet, Rfl: 5  •  Cholecalciferol (VITAMIN D PO), Take 2,000 Units by mouth Daily., Disp: , Rfl:   •  traZODone (DESYREL) 50 MG tablet, Take 50 mg by mouth Every Night., Disp: , Rfl:       OBJECTIVE:  /86 (BP Location: Left arm, Patient Position: Sitting, Cuff Size: Large Adult)   Pulse 81   Temp 98.6 °F (37 °C) (Oral)   Resp 16   Ht 168.9 cm (66.5\")   Wt (!) 164 kg (361 lb)   SpO2 97%   BMI 57.39 kg/m²    Physical Exam   Constitutional: She is oriented to person, place, and time. She appears well-developed and well-nourished.   HENT:   Head: Normocephalic and atraumatic.   Eyes: EOM are normal. Pupils are equal, round, and reactive to light.   Neck: Normal range of motion. Neck supple.   Cardiovascular: Normal rate, regular rhythm and normal heart sounds.   Pulmonary/Chest: Effort normal and breath sounds normal.   Abdominal: Soft. Bowel sounds are normal.   Musculoskeletal: Normal range of motion.   Neurological: She is alert and oriented to person, place, and time.   Skin: Skin is warm and dry.   Psychiatric: She has a normal mood and affect.   Vitals reviewed.      Assessment/Plan    Magda was seen today for jaw pain and headache.    Diagnoses and all orders for this visit:    Jaw pain  -     XR mandible 4+ vw; Future  Will further evaluate with imaging and will provide pain relief medication.     Essential hypertension  Well controlled, BP goal for age is <140/90 per JNC 8 guidelines and continue current medications    Moderate single current episode of major depressive disorder (CMS/HCC)  Will add abilify to her prozac but I also highly encourage her to f/u with [facility] as previously recommended.     Class 3 severe obesity due to excess calories with serious comorbidity " and body mass index (BMI) of 50.0 to 59.9 in adult (CMS/HCC)  Recommended attention to portion control and being careful about the types and timing of meals for the purpose of weight management.      An After Visit Summary was printed and given to the patient at discharge.  Return for Next scheduled follow up. Sooner if problems arise.         Vangie Monique APRN. 5/28/2019

## 2019-05-28 PROBLEM — E66.813 CLASS 3 SEVERE OBESITY DUE TO EXCESS CALORIES WITH SERIOUS COMORBIDITY AND BODY MASS INDEX (BMI) OF 50.0 TO 59.9 IN ADULT: Status: ACTIVE | Noted: 2018-05-14

## 2019-05-28 PROBLEM — E66.01 CLASS 3 SEVERE OBESITY DUE TO EXCESS CALORIES WITH SERIOUS COMORBIDITY AND BODY MASS INDEX (BMI) OF 50.0 TO 59.9 IN ADULT (HCC): Status: ACTIVE | Noted: 2018-05-14

## 2019-06-05 NOTE — PROGRESS NOTES
YOB: 1990  Location: [facility]  Location Address: [address] [place] [address], [facility], [address] [place] [address]  Location Phone: [phone]    Chief Complaint   Patient presents with   • Ear Problem     right ear pain, snoring       History of Present Illness  Magda Palm is a 28 y.o. female.  Magda Palm is here for evaluation of ENT complaints. The patient has had problems with ear fullness, ear pressure, popping and cracking of the ear and muffled hearing  The symptoms are localized to the right ear. The patient has had mild to moderate and improving symptoms. The symptoms have been present for the last several weeks The symptoms are aggravated by  a recent upper respiratory infection. The symptoms are improved by antibiotics.    The patient also reports problems with snoring and having large tonsils. She denies sleep apnea and states other than the tonsils being enlarged they don't bother her otherwise.    The patient states that when her symptoms developed she had severe symptoms with very muffled hearing on the right with ear pain. This was treated with antibiotics and improved, but she continues to have right ear fullness, pressure, slightly muffled hearing.          Past Medical History:   Diagnosis Date   • ADD (attention deficit disorder)    • Anxiety    • Constipation    • Depression    • Diarrhea    • Essential hypertension 2018   • Heart burn    • Insomnia    • Pain     shoulder, knee, back       Past Surgical History:   Procedure Laterality Date   • CHOLECYSTECTOMY      lap   • ENDOSCOPY N/A 2018    Procedure: ESOPHAGOGASTRODUODENOSCOPY WITH ANESTHESIA;  Surgeon: Benny Rueda MD;  Location: [facility] ENDOSCOPY;  Service: General   • WISDOM TOOTH EXTRACTION         Outpatient Medications Marked as Taking for the 19 encounter (Office Visit) with Truman Kidd MD   Medication Sig Dispense Refill   • ARIPiprazole (ABILIFY) 2 MG tablet Take 1 tablet by mouth Daily.  30 tablet 3   • Cholecalciferol (VITAMIN D PO) Take 2,000 Units by mouth Daily.     • cyclobenzaprine (FLEXERIL) 10 MG tablet Take 1 tablet by mouth 3 (Three) Times a Day As Needed for Muscle Spasms. 30 tablet 0   • diclofenac (VOLTAREN) 50 MG EC tablet Take 1 tablet by mouth 2 (Two) Times a Day As Needed (jaw pain). 30 tablet 0   • FLUoxetine (PROzac) 40 MG capsule Take 1 capsule by mouth Daily. 30 capsule 3   • levothyroxine (SYNTHROID) 200 MCG tablet Take 1 tablet by mouth Daily. 30 tablet 1   • losartan (COZAAR) 100 MG tablet Take 1 tablet by mouth Daily. 30 tablet 3   • pantoprazole (PROTONIX) 40 MG EC tablet Take 1 tablet by mouth Daily. 30 tablet 5   • traZODone (DESYREL) 50 MG tablet Take 50 mg by mouth Every Night.         Lisinopril; Norco [hydrocodone-acetaminophen]; and Penicillins    Family History   Problem Relation Age of Onset   • No Known Problems Mother    • No Known Problems Father    • Heart disease Maternal Grandmother    • Cancer Maternal Grandmother    • Stroke Maternal Grandmother    • Diabetes Maternal Grandmother        Social History     Socioeconomic History   • Marital status: Legally      Spouse name: Not on file   • Number of children: Not on file   • Years of education: Not on file   • Highest education level: Not on file   Tobacco Use   • Smoking status: Current Every Day Smoker     Packs/day: 0.25     Years: 10.00     Pack years: 2.50     Types: Cigarettes     Start date: 2002     Last attempt to quit: 9/10/2018     Years since quittin.7   • Smokeless tobacco: Never Used   • Tobacco comment: it is hard, stresses her out   Substance and Sexual Activity   • Alcohol use: No   • Drug use: Yes     Types: Marijuana     Comment: daily    • Sexual activity: Yes     Partners: Female     Birth control/protection: None       Review of Systems   Constitutional: Negative for activity change, appetite change, chills, diaphoresis, fatigue, fever and unexpected weight change.    HENT: Positive for ear pain and hearing loss. Negative for congestion, dental problem, drooling, ear discharge, facial swelling, mouth sores, nosebleeds, postnasal drip, rhinorrhea, sinus pressure, sneezing, sore throat, tinnitus, trouble swallowing and voice change.         Enlarged tonsils   Eyes: Negative.    Respiratory: Negative.         Snoring   Cardiovascular: Negative.    Gastrointestinal: Negative.    Endocrine: Negative.    Musculoskeletal: Positive for arthralgias.   Skin: Negative.    Allergic/Immunologic: Positive for environmental allergies. Negative for food allergies and immunocompromised state.   Neurological: Negative.    Hematological: Negative.    Psychiatric/Behavioral: Negative.        Vitals:    06/06/19 1059   BP: 138/82   Temp: 97.9 °F (36.6 °C)       Body mass index is 56.76 kg/m².    Objective     Physical Exam  CONSTITUTIONAL: well nourished, alert, oriented, in no acute distress     COMMUNICATION AND VOICE: able to communicate normally, normal voice quality    HEAD: normocephalic, no lesions, atraumatic, right TMJ tenderness and popping, no masses     FACE: appearance normal, no lesions, no tenderness, no deformities, facial motion symmetric    SALIVARY GLANDS: parotid glands with no tenderness, no swelling, no masses, submandibular glands with normal size, nontender    EYES: ocular motility normal, eyelids normal, orbits normal, no proptosis, conjunctiva normal , pupils equal, round     EARS:  Hearing: response to conversational voice normal bilaterally   External Ears: auricles without lesions  Otoscopic: tympanic membrane appearance normal, no lesions, no perforation, normal mobility, no fluid    NOSE:  External Nose: structure normal, no tenderness on palpation, no nasal discharge, no lesions, no evidence of trauma, nostrils patent   Intranasal Exam: nasal mucosa edema and erythema, vestibule within normal limits, inferior turbinate normal, nasal septum midline   Nasopharynx:      ORAL:  Lips: upper and lower lips without lesion   Teeth: dentition within normal limits for age   Gums: gingivae healthy   Oral Mucosa: oral mucosa normal, no mucosal lesions   Floor of Mouth: Warthin’s duct patent, mucosa normal  Tongue: lingual mucosa normal without lesions, normal tongue mobility   Palate: soft and hard palates with normal mucosa and structure  Oropharynx: oropharyngeal mucosa normal with +3 tonsils    NECK: neck appearance normal, no mass,  noted without erythema or tenderness    THYROID: no overt thyromegaly, no tenderness, nodules or mass present on palpation, position midline     LYMPH NODES: no lymphadenopathy    CHEST/RESPIRATORY: respiratory effort normal, normal breath sounds     CARDIOVASCULAR: rate and rhythm normal, extremities without cyanosis or edema      NEUROLOGIC/PSYCHIATRIC: oriented to time, place and person, mood normal, affect appropriate, CN II-XII intact grossly    Assessment/Plan   Magda was seen today for ear problem.    Diagnoses and all orders for this visit:    Dysfunction of both eustachian tubes  -     Comprehensive Hearing Test  -     Tympanometry  -     fluticasone (FLONASE) 50 MCG/ACT nasal spray; 2 sprays into the nostril(s) as directed by provider Daily.  -     azelastine (ASTELIN) 0.1 % nasal spray; 2 sprays into the nostril(s) as directed by provider 2 (Two) Times a Day. Use in each nostril as directed    Allergic rhinitis, unspecified seasonality, unspecified trigger  -     fluticasone (FLONASE) 50 MCG/ACT nasal spray; 2 sprays into the nostril(s) as directed by provider Daily.  -     azelastine (ASTELIN) 0.1 % nasal spray; 2 sprays into the nostril(s) as directed by provider 2 (Two) Times a Day. Use in each nostril as directed    Sensorineural hearing loss (SNHL) of both ears  -     Comprehensive Hearing Test  -     Tympanometry    Arthralgia of right temporomandibular joint    Hypertrophy of tonsils    Gastroesophageal reflux disease without  esophagitis    Other specified hypothyroidism    Cigarette smoker      * Surgery not found *  Orders Placed This Encounter   Procedures   • Comprehensive Hearing Test     Order Specific Question:   Laterality     Answer:   Bilateral   • Tympanometry     Order Specific Question:   Laterality     Answer:   Bilateral     Return in about 2 months (around 8/6/2019) for Recheck ears.       Patient Instructions   Will start nasal sprays and TMJ precautions. Will recheck in 2 months. If not improvement will consider PE tube placement.    TemporoMandibular Joint Syndrome      TMJ is generally caused by the clenching or grinding of the teeth at night, but can happen during the day, also.    Symptoms of TMJ:    Pain in or around ear, jaw or neck area  Tenderness around ear or jaw  Popping/clicking from joint in jaw  Tension-type headaches  Stiffness/tightening of joint in jaw    Here are some things to avoid that can cause aggravation of your TMJ:    Do not chew gum.  Do not chew on hard candy.  Avoid eating hard to chew foods.  Decrease stress.    You will need to go on a soft food diet for two (2) weeks, especially when you are symptomatic of TMJ.    If taking any medicines for your TMJ, it is best to take those at night to decrease side effects of the medicine.    You will need to purchase a bite block or guard.  You can either purchase a custom fit guard from your dentist or you can purchase one from a local store in the sports section.    IF YOU SMOKE OR USE TOBACCO PLEASE READ THE FOLLOWING:  Why is smoking bad for me?  Smoking increases the risk of heart disease, lung disease, vascular disease, stroke, and cancer. If you smoke, STOP!    If you would like more information on quitting smoking, please visit the [employer] website: [email or web address]/healthier-[email or web address]   This link will provide additional resources including the QUIT line and the Beat the Pack support groups.     For more  information:  Quit Now [place]  1-800-QUIT-NOW  https://[place].quitlogix.org/en-US/

## 2019-06-06 ENCOUNTER — PROCEDURE VISIT (OUTPATIENT)
Dept: OTOLARYNGOLOGY | Facility: CLINIC | Age: 29
End: 2019-06-06

## 2019-06-06 ENCOUNTER — OFFICE VISIT (OUTPATIENT)
Dept: OTOLARYNGOLOGY | Facility: CLINIC | Age: 29
End: 2019-06-06

## 2019-06-06 VITALS
BODY MASS INDEX: 45.99 KG/M2 | HEIGHT: 67 IN | SYSTOLIC BLOOD PRESSURE: 138 MMHG | DIASTOLIC BLOOD PRESSURE: 82 MMHG | WEIGHT: 293 LBS | TEMPERATURE: 97.9 F

## 2019-06-06 DIAGNOSIS — H69.83 DYSFUNCTION OF BOTH EUSTACHIAN TUBES: Primary | ICD-10-CM

## 2019-06-06 DIAGNOSIS — J35.1 HYPERTROPHY OF TONSILS: ICD-10-CM

## 2019-06-06 DIAGNOSIS — H90.41 SENSORINEURAL HEARING LOSS (SNHL) OF RIGHT EAR WITH UNRESTRICTED HEARING OF LEFT EAR: Primary | ICD-10-CM

## 2019-06-06 DIAGNOSIS — J30.9 ALLERGIC RHINITIS, UNSPECIFIED SEASONALITY, UNSPECIFIED TRIGGER: ICD-10-CM

## 2019-06-06 DIAGNOSIS — K21.9 GASTROESOPHAGEAL REFLUX DISEASE WITHOUT ESOPHAGITIS: ICD-10-CM

## 2019-06-06 DIAGNOSIS — F17.210 CIGARETTE SMOKER: ICD-10-CM

## 2019-06-06 DIAGNOSIS — M26.621 ARTHRALGIA OF RIGHT TEMPOROMANDIBULAR JOINT: ICD-10-CM

## 2019-06-06 DIAGNOSIS — E03.8 OTHER SPECIFIED HYPOTHYROIDISM: ICD-10-CM

## 2019-06-06 DIAGNOSIS — H90.41 SENSORINEURAL HEARING LOSS (SNHL) OF RIGHT EAR WITH UNRESTRICTED HEARING OF LEFT EAR: ICD-10-CM

## 2019-06-06 PROCEDURE — 92557 COMPREHENSIVE HEARING TEST: CPT | Performed by: AUDIOLOGIST-HEARING AID FITTER

## 2019-06-06 PROCEDURE — 92567 TYMPANOMETRY: CPT | Performed by: AUDIOLOGIST-HEARING AID FITTER

## 2019-06-06 PROCEDURE — 99214 OFFICE O/P EST MOD 30 MIN: CPT | Performed by: PHYSICIAN ASSISTANT

## 2019-06-06 RX ORDER — FLUTICASONE PROPIONATE 50 MCG
2 SPRAY, SUSPENSION (ML) NASAL DAILY
Qty: 16 G | Refills: 11 | Status: SHIPPED | OUTPATIENT
Start: 2019-06-06

## 2019-06-06 RX ORDER — AZELASTINE 1 MG/ML
2 SPRAY, METERED NASAL 2 TIMES DAILY
Qty: 30 ML | Refills: 11 | Status: SHIPPED | OUTPATIENT
Start: 2019-06-06

## 2019-06-06 NOTE — PATIENT INSTRUCTIONS
Will start nasal sprays and TMJ precautions. Will recheck in 2 months. If not improvement will consider PE tube placement.    TemporoMandibular Joint Syndrome      TMJ is generally caused by the clenching or grinding of the teeth at night, but can happen during the day, also.    Symptoms of TMJ:    Pain in or around ear, jaw or neck area  Tenderness around ear or jaw  Popping/clicking from joint in jaw  Tension-type headaches  Stiffness/tightening of joint in jaw    Here are some things to avoid that can cause aggravation of your TMJ:    Do not chew gum.  Do not chew on hard candy.  Avoid eating hard to chew foods.  Decrease stress.    You will need to go on a soft food diet for two (2) weeks, especially when you are symptomatic of TMJ.    If taking any medicines for your TMJ, it is best to take those at night to decrease side effects of the medicine.    You will need to purchase a bite block or guard.  You can either purchase a custom fit guard from your dentist or you can purchase one from a local store in the sports section.    IF YOU SMOKE OR USE TOBACCO PLEASE READ THE FOLLOWING:  Why is smoking bad for me?  Smoking increases the risk of heart disease, lung disease, vascular disease, stroke, and cancer. If you smoke, STOP!    If you would like more information on quitting smoking, please visit the [facility] website: [email or web address]   This link will provide additional resources including the QUIT line and the Beat the Pack support groups.     For more information:  Quit Now [place]  1-800-QUIT-NOW  https://[email or web address].quitlogix.org/en-US/

## 2019-06-09 PROBLEM — H90.3 SENSORINEURAL HEARING LOSS (SNHL) OF BOTH EARS: Status: ACTIVE | Noted: 2019-06-09

## 2019-06-09 PROBLEM — J35.1 HYPERTROPHY OF TONSILS: Status: ACTIVE | Noted: 2019-06-09

## 2019-06-09 PROBLEM — J30.9 ALLERGIC RHINITIS: Status: ACTIVE | Noted: 2019-06-09

## 2019-06-09 PROBLEM — H69.83 DYSFUNCTION OF BOTH EUSTACHIAN TUBES: Status: ACTIVE | Noted: 2019-06-09

## 2019-06-09 PROBLEM — H69.93 DYSFUNCTION OF BOTH EUSTACHIAN TUBES: Status: ACTIVE | Noted: 2019-06-09

## 2019-06-09 PROBLEM — M26.621 ARTHRALGIA OF RIGHT TEMPOROMANDIBULAR JOINT: Status: ACTIVE | Noted: 2019-06-09

## 2019-06-10 DIAGNOSIS — E03.8 OTHER SPECIFIED HYPOTHYROIDISM: ICD-10-CM

## 2019-06-10 RX ORDER — LEVOTHYROXINE SODIUM 0.2 MG/1
200 TABLET ORAL DAILY
Qty: 30 TABLET | Refills: 5 | Status: SHIPPED | OUTPATIENT
Start: 2019-06-10

## 2019-06-28 ENCOUNTER — HOSPITAL ENCOUNTER (EMERGENCY)
Facility: HOSPITAL | Age: 29
Discharge: HOME | End: 2019-06-28
Attending: EMERGENCY MEDICINE | Admitting: EMERGENCY MEDICINE

## 2019-06-28 ENCOUNTER — APPOINTMENT (OUTPATIENT)
Dept: CT IMAGING | Facility: HOSPITAL | Age: 29
End: 2019-06-28

## 2019-06-28 VITALS
HEIGHT: 68 IN | HEART RATE: 89 BPM | BODY MASS INDEX: 44.41 KG/M2 | RESPIRATION RATE: 18 BRPM | OXYGEN SATURATION: 98 % | DIASTOLIC BLOOD PRESSURE: 99 MMHG | SYSTOLIC BLOOD PRESSURE: 140 MMHG | TEMPERATURE: 98.7 F | WEIGHT: 293 LBS

## 2019-06-28 DIAGNOSIS — N20.0 KIDNEY STONE ON LEFT SIDE: Primary | ICD-10-CM

## 2019-06-28 LAB
ALBUMIN SERPL-MCNC: 4.3 G/DL (ref 3.5–5)
ALBUMIN/GLOB SERPL: 1.3 G/DL (ref 1.1–2.5)
ALP SERPL-CCNC: 71 U/L (ref 24–120)
ALT SERPL W P-5'-P-CCNC: 44 U/L (ref 0–54)
ANION GAP SERPL CALCULATED.3IONS-SCNC: 9 MMOL/L (ref 4–13)
AST SERPL-CCNC: 44 U/L (ref 7–45)
B-HCG UR QL: NEGATIVE
BACTERIA UR QL AUTO: ABNORMAL /HPF
BASOPHILS # BLD AUTO: 0.12 10*3/MM3 (ref 0–0.2)
BASOPHILS NFR BLD AUTO: 1.1 % (ref 0–2)
BILIRUB SERPL-MCNC: 0.4 MG/DL (ref 0.1–1)
BILIRUB UR QL STRIP: ABNORMAL
BUN BLD-MCNC: 11 MG/DL (ref 5–21)
BUN/CREAT SERPL: 12.9 (ref 7–25)
CALCIUM SPEC-SCNC: 9.6 MG/DL (ref 8.4–10.4)
CHLORIDE SERPL-SCNC: 107 MMOL/L (ref 98–110)
CLARITY UR: ABNORMAL
CO2 SERPL-SCNC: 28 MMOL/L (ref 24–31)
COLOR UR: ABNORMAL
CREAT BLD-MCNC: 0.85 MG/DL (ref 0.5–1.4)
DEPRECATED RDW RBC AUTO: 38.8 FL (ref 40–54)
EOSINOPHIL # BLD AUTO: 0.35 10*3/MM3 (ref 0–0.7)
EOSINOPHIL NFR BLD AUTO: 3.2 % (ref 0–4)
ERYTHROCYTE [DISTWIDTH] IN BLOOD BY AUTOMATED COUNT: 14.2 % (ref 12–15)
GFR SERPL CREATININE-BSD FRML MDRD: 80 ML/MIN/1.73
GLOBULIN UR ELPH-MCNC: 3.4 GM/DL
GLUCOSE BLD-MCNC: 123 MG/DL (ref 70–100)
GLUCOSE UR STRIP-MCNC: NEGATIVE MG/DL
HCT VFR BLD AUTO: 42.6 % (ref 37–47)
HGB BLD-MCNC: 13.5 G/DL (ref 12–16)
HGB UR QL STRIP.AUTO: ABNORMAL
HYALINE CASTS UR QL AUTO: ABNORMAL /LPF
IMM GRANULOCYTES # BLD AUTO: 0.06 10*3/MM3 (ref 0–0.05)
IMM GRANULOCYTES NFR BLD AUTO: 0.6 % (ref 0–5)
INTERNAL NEGATIVE CONTROL: NEGATIVE
INTERNAL POSITIVE CONTROL: POSITIVE
KETONES UR QL STRIP: ABNORMAL
LEUKOCYTE ESTERASE UR QL STRIP.AUTO: NEGATIVE
LIPASE SERPL-CCNC: 42 U/L (ref 23–203)
LYMPHOCYTES # BLD AUTO: 3.51 10*3/MM3 (ref 0.72–4.86)
LYMPHOCYTES NFR BLD AUTO: 32.4 % (ref 15–45)
Lab: NORMAL
MCH RBC QN AUTO: 24.3 PG (ref 28–32)
MCHC RBC AUTO-ENTMCNC: 31.7 G/DL (ref 33–36)
MCV RBC AUTO: 76.6 FL (ref 82–98)
MONOCYTES # BLD AUTO: 0.55 10*3/MM3 (ref 0.19–1.3)
MONOCYTES NFR BLD AUTO: 5.1 % (ref 4–12)
NEUTROPHILS # BLD AUTO: 6.24 10*3/MM3 (ref 1.87–8.4)
NEUTROPHILS NFR BLD AUTO: 57.6 % (ref 39–78)
NITRITE UR QL STRIP: NEGATIVE
NRBC BLD AUTO-RTO: 0 /100 WBC (ref 0–0.2)
PH UR STRIP.AUTO: <=5 [PH] (ref 5–8)
PLATELET # BLD AUTO: 497 10*3/MM3 (ref 130–400)
PMV BLD AUTO: 9.3 FL (ref 6–12)
POTASSIUM BLD-SCNC: 3.9 MMOL/L (ref 3.5–5.3)
PROT SERPL-MCNC: 7.7 G/DL (ref 6.3–8.7)
PROT UR QL STRIP: ABNORMAL
RBC # BLD AUTO: 5.56 10*6/MM3 (ref 4.2–5.4)
RBC # UR: ABNORMAL /HPF
REF LAB TEST METHOD: ABNORMAL
SODIUM BLD-SCNC: 144 MMOL/L (ref 135–145)
SP GR UR STRIP: >1.03 (ref 1–1.03)
SQUAMOUS #/AREA URNS HPF: ABNORMAL /HPF
UROBILINOGEN UR QL STRIP: ABNORMAL
WBC NRBC COR # BLD: 10.83 10*3/MM3 (ref 4.8–10.8)
WBC UR QL AUTO: ABNORMAL /HPF

## 2019-06-28 PROCEDURE — 96374 THER/PROPH/DIAG INJ IV PUSH: CPT

## 2019-06-28 PROCEDURE — 83690 ASSAY OF LIPASE: CPT | Performed by: NURSE PRACTITIONER

## 2019-06-28 PROCEDURE — 80053 COMPREHEN METABOLIC PANEL: CPT | Performed by: NURSE PRACTITIONER

## 2019-06-28 PROCEDURE — 96375 TX/PRO/DX INJ NEW DRUG ADDON: CPT

## 2019-06-28 PROCEDURE — 74176 CT ABD & PELVIS W/O CONTRAST: CPT

## 2019-06-28 PROCEDURE — 25010000002 ONDANSETRON PER 1 MG: Performed by: NURSE PRACTITIONER

## 2019-06-28 PROCEDURE — 25010000002 HYDROMORPHONE PER 4 MG: Performed by: EMERGENCY MEDICINE

## 2019-06-28 PROCEDURE — 99284 EMERGENCY DEPT VISIT MOD MDM: CPT

## 2019-06-28 PROCEDURE — 81025 URINE PREGNANCY TEST: CPT | Performed by: NURSE PRACTITIONER

## 2019-06-28 PROCEDURE — 81001 URINALYSIS AUTO W/SCOPE: CPT | Performed by: NURSE PRACTITIONER

## 2019-06-28 PROCEDURE — 96361 HYDRATE IV INFUSION ADD-ON: CPT

## 2019-06-28 PROCEDURE — 85025 COMPLETE CBC W/AUTO DIFF WBC: CPT | Performed by: NURSE PRACTITIONER

## 2019-06-28 RX ORDER — TAMSULOSIN HYDROCHLORIDE 0.4 MG/1
1 CAPSULE ORAL NIGHTLY
Qty: 5 CAPSULE | Refills: 0 | Status: SHIPPED | OUTPATIENT
Start: 2019-06-28 | End: 2019-07-03

## 2019-06-28 RX ORDER — HYDROMORPHONE HYDROCHLORIDE 1 MG/ML
0.5 INJECTION, SOLUTION INTRAMUSCULAR; INTRAVENOUS; SUBCUTANEOUS ONCE
Status: COMPLETED | OUTPATIENT
Start: 2019-06-28 | End: 2019-06-28

## 2019-06-28 RX ORDER — ONDANSETRON 4 MG/1
4 TABLET, ORALLY DISINTEGRATING ORAL EVERY 8 HOURS PRN
Qty: 15 TABLET | Refills: 0 | Status: SHIPPED | OUTPATIENT
Start: 2019-06-28 | End: 2019-07-03

## 2019-06-28 RX ORDER — ONDANSETRON 2 MG/ML
4 INJECTION INTRAMUSCULAR; INTRAVENOUS ONCE
Status: COMPLETED | OUTPATIENT
Start: 2019-06-28 | End: 2019-06-28

## 2019-06-28 RX ORDER — OXYCODONE HYDROCHLORIDE AND ACETAMINOPHEN 5; 325 MG/1; MG/1
1 TABLET ORAL EVERY 6 HOURS PRN
Qty: 12 TABLET | Refills: 0 | Status: SHIPPED | OUTPATIENT
Start: 2019-06-28 | End: 2019-07-01

## 2019-06-28 RX ADMIN — HYDROMORPHONE HYDROCHLORIDE 0.5 MG: 1 INJECTION, SOLUTION INTRAMUSCULAR; INTRAVENOUS; SUBCUTANEOUS at 08:56

## 2019-06-28 RX ADMIN — SODIUM CHLORIDE 1000 ML: 9 INJECTION, SOLUTION INTRAVENOUS at 08:21

## 2019-06-28 RX ADMIN — ONDANSETRON 4 MG: 2 SOLUTION INTRAMUSCULAR; INTRAVENOUS at 08:19

## 2023-04-29 ENCOUNTER — HOSPITAL ENCOUNTER (EMERGENCY)
Facility: HOSPITAL | Age: 33
Discharge: HOME | End: 2023-04-29
Payer: COMMERCIAL

## 2023-04-29 ENCOUNTER — APPOINTMENT (OUTPATIENT)
Dept: CT IMAGING | Facility: HOSPITAL | Age: 33
End: 2023-04-29
Payer: COMMERCIAL

## 2023-04-29 VITALS
WEIGHT: 292 LBS | HEIGHT: 68 IN | HEART RATE: 73 BPM | SYSTOLIC BLOOD PRESSURE: 148 MMHG | RESPIRATION RATE: 16 BRPM | DIASTOLIC BLOOD PRESSURE: 78 MMHG | TEMPERATURE: 98 F | BODY MASS INDEX: 44.25 KG/M2 | OXYGEN SATURATION: 96 %

## 2023-04-29 DIAGNOSIS — N20.1 CALCULUS OF URETER: Primary | ICD-10-CM

## 2023-04-29 LAB
ALBUMIN SERPL-MCNC: 4.1 G/DL (ref 3.5–5.2)
ALBUMIN/GLOB SERPL: 1.4 G/DL
ALP SERPL-CCNC: 59 U/L (ref 39–117)
ALT SERPL W P-5'-P-CCNC: 16 U/L (ref 1–33)
ANION GAP SERPL CALCULATED.3IONS-SCNC: 12 MMOL/L (ref 5–15)
AST SERPL-CCNC: 16 U/L (ref 1–32)
BACTERIA UR QL AUTO: ABNORMAL /HPF
BASOPHILS # BLD AUTO: 0.12 10*3/MM3 (ref 0–0.2)
BASOPHILS NFR BLD AUTO: 1.6 % (ref 0–1.5)
BILIRUB SERPL-MCNC: 0.4 MG/DL (ref 0–1.2)
BILIRUB UR QL STRIP: NEGATIVE
BUN SERPL-MCNC: 11 MG/DL (ref 6–20)
BUN/CREAT SERPL: 13.9 (ref 7–25)
CALCIUM SPEC-SCNC: 9.4 MG/DL (ref 8.6–10.5)
CHLORIDE SERPL-SCNC: 104 MMOL/L (ref 98–107)
CLARITY UR: CLEAR
CO2 SERPL-SCNC: 23 MMOL/L (ref 22–29)
COLOR UR: ABNORMAL
CREAT SERPL-MCNC: 0.79 MG/DL (ref 0.57–1)
D-LACTATE SERPL-SCNC: 1.2 MMOL/L (ref 0.5–2)
DEPRECATED RDW RBC AUTO: 38.4 FL (ref 37–54)
EGFRCR SERPLBLD CKD-EPI 2021: 102.1 ML/MIN/1.73
EOSINOPHIL # BLD AUTO: 0.2 10*3/MM3 (ref 0–0.4)
EOSINOPHIL NFR BLD AUTO: 2.7 % (ref 0.3–6.2)
ERYTHROCYTE [DISTWIDTH] IN BLOOD BY AUTOMATED COUNT: 12.7 % (ref 12.3–15.4)
GLOBULIN UR ELPH-MCNC: 3 GM/DL
GLUCOSE SERPL-MCNC: 129 MG/DL (ref 65–99)
GLUCOSE UR STRIP-MCNC: NEGATIVE MG/DL
HCG SERPL QL: NEGATIVE
HCT VFR BLD AUTO: 45.8 % (ref 34–46.6)
HGB BLD-MCNC: 14.8 G/DL (ref 12–15.9)
HGB UR QL STRIP.AUTO: ABNORMAL
HYALINE CASTS UR QL AUTO: ABNORMAL /LPF
IMM GRANULOCYTES # BLD AUTO: 0.03 10*3/MM3 (ref 0–0.05)
IMM GRANULOCYTES NFR BLD AUTO: 0.4 % (ref 0–0.5)
KETONES UR QL STRIP: NEGATIVE
LEUKOCYTE ESTERASE UR QL STRIP.AUTO: NEGATIVE
LIPASE SERPL-CCNC: 25 U/L (ref 13–60)
LYMPHOCYTES # BLD AUTO: 2.23 10*3/MM3 (ref 0.7–3.1)
LYMPHOCYTES NFR BLD AUTO: 30.6 % (ref 19.6–45.3)
MCH RBC QN AUTO: 26.7 PG (ref 26.6–33)
MCHC RBC AUTO-ENTMCNC: 32.3 G/DL (ref 31.5–35.7)
MCV RBC AUTO: 82.7 FL (ref 79–97)
MONOCYTES # BLD AUTO: 0.43 10*3/MM3 (ref 0.1–0.9)
MONOCYTES NFR BLD AUTO: 5.9 % (ref 5–12)
NEUTROPHILS NFR BLD AUTO: 4.28 10*3/MM3 (ref 1.7–7)
NEUTROPHILS NFR BLD AUTO: 58.8 % (ref 42.7–76)
NITRITE UR QL STRIP: NEGATIVE
NRBC BLD AUTO-RTO: 0 /100 WBC (ref 0–0.2)
PH UR STRIP.AUTO: 5.5 [PH] (ref 5–8)
PLATELET # BLD AUTO: 398 10*3/MM3 (ref 140–450)
PMV BLD AUTO: 9.2 FL (ref 6–12)
POTASSIUM SERPL-SCNC: 4.1 MMOL/L (ref 3.5–5.2)
PROT SERPL-MCNC: 7.1 G/DL (ref 6–8.5)
PROT UR QL STRIP: ABNORMAL
RBC # BLD AUTO: 5.54 10*6/MM3 (ref 3.77–5.28)
RBC # UR STRIP: ABNORMAL /HPF
REF LAB TEST METHOD: ABNORMAL
SODIUM SERPL-SCNC: 139 MMOL/L (ref 136–145)
SP GR UR STRIP: 1.02 (ref 1–1.03)
SQUAMOUS #/AREA URNS HPF: ABNORMAL /HPF
UROBILINOGEN UR QL STRIP: ABNORMAL
WBC # UR STRIP: ABNORMAL /HPF
WBC NRBC COR # BLD: 7.29 10*3/MM3 (ref 3.4–10.8)

## 2023-04-29 PROCEDURE — 83605 ASSAY OF LACTIC ACID: CPT

## 2023-04-29 PROCEDURE — 83690 ASSAY OF LIPASE: CPT

## 2023-04-29 PROCEDURE — 81001 URINALYSIS AUTO W/SCOPE: CPT

## 2023-04-29 PROCEDURE — 96375 TX/PRO/DX INJ NEW DRUG ADDON: CPT

## 2023-04-29 PROCEDURE — 25010000002 ONDANSETRON PER 1 MG

## 2023-04-29 PROCEDURE — 25510000001 IOPAMIDOL 61 % SOLUTION

## 2023-04-29 PROCEDURE — 84703 CHORIONIC GONADOTROPIN ASSAY: CPT

## 2023-04-29 PROCEDURE — 99283 EMERGENCY DEPT VISIT LOW MDM: CPT

## 2023-04-29 PROCEDURE — 85025 COMPLETE CBC W/AUTO DIFF WBC: CPT

## 2023-04-29 PROCEDURE — 80053 COMPREHEN METABOLIC PANEL: CPT

## 2023-04-29 PROCEDURE — 25010000002 KETOROLAC TROMETHAMINE PER 15 MG

## 2023-04-29 PROCEDURE — 74177 CT ABD & PELVIS W/CONTRAST: CPT

## 2023-04-29 PROCEDURE — 0 HYDROMORPHONE 1 MG/ML SOLUTION: Performed by: EMERGENCY MEDICINE

## 2023-04-29 PROCEDURE — 96374 THER/PROPH/DIAG INJ IV PUSH: CPT

## 2023-04-29 RX ORDER — ONDANSETRON 4 MG/1
4 TABLET, ORALLY DISINTEGRATING ORAL EVERY 6 HOURS PRN
Qty: 40 TABLET | Refills: 0 | Status: SHIPPED | OUTPATIENT
Start: 2023-04-29

## 2023-04-29 RX ORDER — SODIUM CHLORIDE 0.9 % (FLUSH) 0.9 %
10 SYRINGE (ML) INJECTION AS NEEDED
Status: DISCONTINUED | OUTPATIENT
Start: 2023-04-29 | End: 2023-04-29 | Stop reason: HOSPADM

## 2023-04-29 RX ORDER — KETOROLAC TROMETHAMINE 10 MG/1
10 TABLET, FILM COATED ORAL EVERY 6 HOURS PRN
Qty: 12 TABLET | Refills: 0 | Status: SHIPPED | OUTPATIENT
Start: 2023-04-29

## 2023-04-29 RX ORDER — HYDROCODONE BITARTRATE AND ACETAMINOPHEN 5; 325 MG/1; MG/1
1 TABLET ORAL EVERY 6 HOURS PRN
Qty: 12 TABLET | Refills: 0 | Status: SHIPPED | OUTPATIENT
Start: 2023-04-29 | End: 2023-05-02

## 2023-04-29 RX ORDER — TAMSULOSIN HYDROCHLORIDE 0.4 MG/1
1 CAPSULE ORAL DAILY
Qty: 30 CAPSULE | Refills: 0 | Status: SHIPPED | OUTPATIENT
Start: 2023-04-29

## 2023-04-29 RX ORDER — KETOROLAC TROMETHAMINE 15 MG/ML
15 INJECTION, SOLUTION INTRAMUSCULAR; INTRAVENOUS ONCE
Status: COMPLETED | OUTPATIENT
Start: 2023-04-29 | End: 2023-04-29

## 2023-04-29 RX ORDER — ONDANSETRON 2 MG/ML
4 INJECTION INTRAMUSCULAR; INTRAVENOUS ONCE
Status: COMPLETED | OUTPATIENT
Start: 2023-04-29 | End: 2023-04-29

## 2023-04-29 RX ADMIN — HYDROMORPHONE HYDROCHLORIDE 1 MG: 1 INJECTION, SOLUTION INTRAMUSCULAR; INTRAVENOUS; SUBCUTANEOUS at 10:06

## 2023-04-29 RX ADMIN — SODIUM CHLORIDE 1000 ML: 9 INJECTION, SOLUTION INTRAVENOUS at 08:34

## 2023-04-29 RX ADMIN — KETOROLAC TROMETHAMINE 15 MG: 15 INJECTION, SOLUTION INTRAMUSCULAR; INTRAVENOUS at 08:32

## 2023-04-29 RX ADMIN — IOPAMIDOL 100 ML: 612 INJECTION, SOLUTION INTRAVENOUS at 09:12

## 2023-04-29 RX ADMIN — ONDANSETRON 4 MG: 2 INJECTION INTRAMUSCULAR; INTRAVENOUS at 08:32

## 2023-04-29 NOTE — Clinical Note
[facility] EMERGENCY DEPARTMENT  [facility] [address]  Phone: [phone]    Magda Palm was seen and treated in our emergency department on 4/29/2023.  She may return to work on 05/01/2023.         Thank you for choosing [facility].    Alyssa Garcia, APRN

## 2023-04-29 NOTE — DISCHARGE INSTRUCTIONS
Today your CT scan reveals a small 2 mm stone on the left side where you are having pain.  I am sending in a prescription for several different medications to help you pass this stone.  Please follow-up with your PCP and call the urology office on Monday to set up an appointment for follow-up.  I have included their phone number below.  Please return to the ED with any new, worsening, or persistent symptoms.

## 2023-04-29 NOTE — ED PROVIDER NOTES
Subjective   History of Present Illness  Patient is a 32-year-old female who presents to the ED today complaining of left-sided flank pain that woke her up this morning around 5 AM.  She reports history of stones that she has previously been able to pass, no history of stent or lithotripsy.  Patient denies any fever, body aches, or chills.  She denies any chest pain or shortness of breath.  Reports the pain in her left flank does wrap around to the front portion of her abdomen; upon entering the room the patient is lying on her left side.  She reports she has had nausea and vomiting with this pretty consistently since she woke up.  She does not currently see a urologist.  PMH is significant for ADHD, anxiety, depression, hypertension, GERD, and insomnia.  Differential diagnoses: Nephrolithiasis/ureterolithiasis, UTI, pyelonephritis, and other.    History provided by:  Patient   used: No        Review of Systems   Constitutional: Negative for chills, diaphoresis, fatigue and fever.   HENT: Negative.    Eyes: Negative.    Respiratory: Negative for cough, shortness of breath and wheezing.    Cardiovascular: Negative for chest pain and palpitations.   Gastrointestinal: Positive for abdominal pain, nausea and vomiting. Negative for abdominal distention, constipation and diarrhea.   Genitourinary: Positive for flank pain.   Musculoskeletal: Positive for back pain.   Skin: Negative.    Neurological: Negative.    Psychiatric/Behavioral: Negative.        Past Medical History:   Diagnosis Date   • ADD (attention deficit disorder)    • Anxiety    • Constipation    • Depression    • Diarrhea    • Essential hypertension 5/14/2018   • Heart burn    • Insomnia    • Pain     shoulder, knee, back       Allergies   Allergen Reactions   • Lisinopril Cough   • Norco [Hydrocodone-Acetaminophen] Nausea Only and Unknown (See Comments)     Stomach pain   • Penicillins Hives and Nausea And Vomiting       Past Surgical  History:   Procedure Laterality Date   • CHOLECYSTECTOMY      lap   • ENDOSCOPY N/A 2018    Procedure: ESOPHAGOGASTRODUODENOSCOPY WITH ANESTHESIA;  Surgeon: Benny Rueda MD;  Location: [facility] ENDOSCOPY;  Service: General   • WISDOM TOOTH EXTRACTION         Family History   Problem Relation Age of Onset   • No Known Problems Mother    • No Known Problems Father    • Heart disease Maternal Grandmother    • Cancer Maternal Grandmother    • Stroke Maternal Grandmother    • Diabetes Maternal Grandmother        Social History     Socioeconomic History   • Marital status:    Tobacco Use   • Smoking status: Every Day     Packs/day: 1.00     Years: 10.00     Pack years: 10.00     Types: Cigarettes     Start date: 2002     Last attempt to quit: 9/10/2018     Years since quittin.6   • Smokeless tobacco: Never   • Tobacco comments:     it is hard, stresses her out   Substance and Sexual Activity   • Alcohol use: No   • Drug use: Yes     Types: Marijuana     Comment: daily    • Sexual activity: Yes     Partners: Female     Birth control/protection: None     Objective   Physical Exam  Vitals and nursing note reviewed.   Constitutional:       General: She is not in acute distress.     Appearance: Normal appearance. She is not ill-appearing, toxic-appearing or diaphoretic.   HENT:      Head: Normocephalic and atraumatic.      Right Ear: External ear normal.      Left Ear: External ear normal.      Nose: Nose normal.   Eyes:      Extraocular Movements: Extraocular movements intact.      Conjunctiva/sclera: Conjunctivae normal.      Pupils: Pupils are equal, round, and reactive to light.   Cardiovascular:      Rate and Rhythm: Normal rate and regular rhythm.      Pulses: Normal pulses.      Heart sounds: Normal heart sounds.   Pulmonary:      Effort: Pulmonary effort is normal.      Breath sounds: Normal breath sounds.   Abdominal:      General: Bowel sounds are normal. There is no distension.      Palpations:  Abdomen is soft.      Tenderness: There is no abdominal tenderness. There is left CVA tenderness. There is no right CVA tenderness, guarding or rebound.   Musculoskeletal:      Cervical back: Normal range of motion.   Skin:     General: Skin is warm and dry.      Capillary Refill: Capillary refill takes less than 2 seconds.   Neurological:      Mental Status: She is alert and oriented to person, place, and time. Mental status is at baseline.      GCS: GCS eye subscore is 4. GCS verbal subscore is 5. GCS motor subscore is 6.   Psychiatric:         Mood and Affect: Mood normal.         Behavior: Behavior normal.         Thought Content: Thought content normal.         Judgment: Judgment normal.       Labs Reviewed   COMPREHENSIVE METABOLIC PANEL - Abnormal; Notable for the following components:       Result Value    Glucose 129 (*)     All other components within normal limits    Narrative:     GFR Normal >60  Chronic Kidney Disease <60  Kidney Failure <15     URINALYSIS W/ CULTURE IF INDICATED - Abnormal; Notable for the following components:    Color, UA Dark Yellow (*)     Blood, UA Large (3+) (*)     Protein, UA Trace (*)     All other components within normal limits    Narrative:     In absence of clinical symptoms, the presence of pyuria, bacteria, and/or nitrites on the urinalysis result does not correlate with infection.   CBC WITH AUTO DIFFERENTIAL - Abnormal; Notable for the following components:    RBC 5.54 (*)     Basophil % 1.6 (*)     All other components within normal limits   URINALYSIS, MICROSCOPIC ONLY - Abnormal; Notable for the following components:    RBC, UA Too Numerous to Count (*)     WBC, UA 0-2 (*)     All other components within normal limits   LIPASE - Normal   HCG, SERUM, QUALITATIVE - Normal   LACTIC ACID, PLASMA - Normal   CBC AND DIFFERENTIAL    Narrative:     The following orders were created for panel order CBC & Differential.  Procedure                               Abnormality          Status                     ---------                               -----------         ------                     CBC Auto Differential[[id number]]        Abnormal            Final result                 Please view results for these tests on the individual orders.     CT Abdomen Pelvis With Contrast   Final Result   1. Obstructive uropathy on the left secondary to a punctate 2 mm   calculus within the proximal ureter just below the UPJ. There is   associated asymmetric enhancement of the left kidney and perinephric   stranding. No evidence of nonobstructing nephrolithiasis within the   upper tracts of either kidney. The ureters are otherwise decompressed   and normal in appearance..   2. Small fat-containing periumbilical hernia. A small hiatal hernia is   demonstrated.   3. Status post cholecystectomy.           This report was finalized on 04/29/2023 09:29 by Dr. Nelson Hodge MD.        Medications   sodium chloride 0.9 % bolus 1,000 mL (0 mL Intravenous Stopped 4/29/23 0904)   ondansetron (ZOFRAN) injection 4 mg (4 mg Intravenous Given 4/29/23 0832)   ketorolac (TORADOL) injection 15 mg (15 mg Intravenous Given 4/29/23 0832)   iopamidol (ISOVUE-300) 61 % injection 100 mL (100 mL Intravenous Given 4/29/23 0912)   HYDROmorphone (DILAUDID) injection 1 mg (1 mg Intravenous Given 4/29/23 1006)     Procedures           ED Course  ED Course as of 05/01/23 1634   Sat Apr 29, 2023   1000 On reeval the patient is still in a significant amount of pain.  Will trial a dose of Dilaudid.  [HE]   1032 On reeval the patient is reporting significantly improved pain level following dose of Dilaudid, is 1 out of 10 at this time.  I did discuss the patient's case with urologist on-call, Dr. Dalal who does recommend a trial of passage at home with Flomax, Zofran, Norco, and oral Toradol.  He recommends the patient call the office on Monday morning to get an appointment set up for follow-up as I am not seeing any signs of  infection in the blood or urine.  Patient is agreeable and appreciative with this plan of care.  It appears the patient did have an episode of hypoxia while she was sleeping after having the dose of Dilaudid. [HE]      ED Course User Index  [HE] Alyssa Garcia APRN                                           Medical Decision Making  Patient is a 32-year-old female who presents to the ED today complaining of left-sided flank pain that woke her up this morning around 5 AM.  She reports history of stones that she has previously been able to pass, no history of stent or lithotripsy.  Patient denies any fever, body aches, or chills.  She denies any chest pain or shortness of breath.  Reports the pain in her left flank does wrap around to the front portion of her abdomen; upon entering the room the patient is lying on her left side.  She reports she has had nausea and vomiting with this pretty consistently since she woke up.  She does not currently see a urologist.  PMH is significant for ADHD, anxiety, depression, hypertension, GERD, and insomnia.  Differential diagnoses: Nephrolithiasis/ureterolithiasis, UTI, pyelonephritis, and other.    Patient was given Toradol, Dilaudid, IV fluids, and Zofran in the ED.  Lab work-up is grossly unremarkable, UA does not reveal any signs of infection.    CT reveals obstructive uropathy on the left secondary to a punctate 2 mm  calculus within the proximal ureter just below the UPJ. There is  associated asymmetric enhancement of the left kidney and perinephric  stranding. No evidence of nonobstructing nephrolithiasis within the  upper tracts of either kidney. The ureters are otherwise decompressed  and normal in appearance..  2. Small fat-containing periumbilical hernia. A small hiatal hernia is  demonstrated.  3. Status post cholecystectomy.    On reeval the patient is reporting significantly improved pain level following dose of Dilaudid, is 1 out of 10 at this time.  I did discuss  the patient's case with urologist on-call, Dr. Dalal who does recommend a trial of passage at home with Flomax, Zofran, Norco, and oral Toradol. He recommends the patient call the office on Monday morning to get an appointment set up for follow-up as I am not seeing any signs of infection in the blood or urine.  Patient is agreeable and appreciative with this plan of care.  It appears the patient did have an episode of hypoxia while she was sleeping after having the dose of Dilaudid.  This has subsequently improved on waking the patient, no oxygen required.    Patient was sent home with Norco, Flomax, Toradol, and Zofran for trial of passage.  I provided her with the phone number for Dr. Dalal office, she will call to set up an appointment.  She will also touch base with her PCP.  Return precautions provided.  Vital signs reassuring, patient agreeable and appreciative, patient discharged in stable condition.    Calculus of ureter: acute illness or injury  Amount and/or Complexity of Data Reviewed  Labs: ordered.  Radiology: ordered.      Risk  Prescription drug management.          Final diagnoses:   Calculus of ureter       ED Disposition  ED Disposition     ED Disposition   Discharge    Condition   Stable    Comment   --             Spencer Falcon,   [address]  [place] [id number]  [phone]          German Dalal MD  [address]  [place] [id number]  [phone]      Call the office on Monday morning to set up an appointment for follow-up         Medication List      New Prescriptions    HYDROcodone-acetaminophen 5-325 MG per tablet  Commonly known as: NORCO  Take 1 tablet by mouth Every 6 (Six) Hours As Needed for Mild Pain, Moderate Pain or Severe Pain for up to 3 days.     ketorolac 10 MG tablet  Commonly known as: TORADOL  Take 1 tablet by mouth Every 6 (Six) Hours As Needed for Moderate Pain.     ondansetron ODT 4 MG disintegrating tablet  Commonly known  as: ZOFRAN-ODT  Place 1 tablet on the tongue Every 6 (Six) Hours As Needed for Nausea.     tamsulosin 0.4 MG capsule 24 hr capsule  Commonly known as: FLOMAX  Take 1 capsule by mouth Daily.           Where to Get Your Medications      These medications [facility] to [id number] - [place], [place] - [address] AT [facility] [address] [facility] [address] - [phone] [facility] [phone]   [address], [facility] [address]    Phone: [phone]   · HYDROcodone-acetaminophen 5-325 MG per tablet  · ketorolac 10 MG tablet  · ondansetron ODT 4 MG disintegrating tablet  · tamsulosin 0.4 MG capsule 24 hr capsule          Alyssa Garcia, APRN  05/01/23 4826

## 2023-05-01 ENCOUNTER — TELEPHONE (OUTPATIENT)
Dept: UROLOGY | Facility: CLINIC | Age: 33
End: 2023-05-01
Payer: COMMERCIAL

## 2023-05-01 NOTE — TELEPHONE ENCOUNTER
Called patient and spoke to a family member.  Patient did not have a phone on them at the moment, so I asked that patient's family member to call the office back to schedule an appointment.  I've created a referral based on the information in patient's chart and Dr. Dalal' message.    If patient calls back, she may be scheduled in 1-2 weeks with [facility] or [facility] as a NEW PATIENT for ER F/U on kidney stones.  She will need a KUB 1 hour prior to the appointment.     Hub is okay to schedule this unless they have trouble finding a new patient slot, then please send to .

## 2023-05-01 NOTE — TELEPHONE ENCOUNTER
----- Message from German Dalal MD sent at 4/29/2023  5:08 PM CDT -----  Regarding: Needs new patient appt with [facility] or [facility] in 1-2 weeks  Thank you

## 2023-07-19 ENCOUNTER — HOSPITAL ENCOUNTER (EMERGENCY)
Facility: HOSPITAL | Age: 33
Discharge: HOME | End: 2023-07-20
Admitting: STUDENT IN AN ORGANIZED HEALTH CARE EDUCATION/TRAINING PROGRAM
Payer: COMMERCIAL

## 2023-07-19 ENCOUNTER — APPOINTMENT (OUTPATIENT)
Dept: ULTRASOUND IMAGING | Facility: HOSPITAL | Age: 33
End: 2023-07-19
Payer: COMMERCIAL

## 2023-07-19 DIAGNOSIS — O03.9 SPONTANEOUS ABORTION: Primary | ICD-10-CM

## 2023-07-19 LAB
ABO GROUP BLD: NORMAL
ABO GROUP BLD: NORMAL
ALBUMIN SERPL-MCNC: 4.2 G/DL (ref 3.5–5.2)
ALBUMIN/GLOB SERPL: 1.5 G/DL
ALP SERPL-CCNC: 43 U/L (ref 39–117)
ALT SERPL W P-5'-P-CCNC: 13 U/L (ref 1–33)
ANION GAP SERPL CALCULATED.3IONS-SCNC: 15 MMOL/L (ref 5–15)
APTT PPP: 30.7 SECONDS (ref 24.1–35)
AST SERPL-CCNC: 25 U/L (ref 1–32)
BASOPHILS # BLD AUTO: 0.09 10*3/MM3 (ref 0–0.2)
BASOPHILS NFR BLD AUTO: 0.6 % (ref 0–1.5)
BILIRUB SERPL-MCNC: 0.3 MG/DL (ref 0–1.2)
BLD GP AB SCN SERPL QL: NEGATIVE
BLD GP AB SCN SERPL QL: NEGATIVE
BUN SERPL-MCNC: 7 MG/DL (ref 6–20)
BUN/CREAT SERPL: 11.9 (ref 7–25)
CALCIUM SPEC-SCNC: 9.5 MG/DL (ref 8.6–10.5)
CHLORIDE SERPL-SCNC: 103 MMOL/L (ref 98–107)
CO2 SERPL-SCNC: 21 MMOL/L (ref 22–29)
CREAT SERPL-MCNC: 0.59 MG/DL (ref 0.57–1)
DEPRECATED RDW RBC AUTO: 39.2 FL (ref 37–54)
EGFRCR SERPLBLD CKD-EPI 2021: 123 ML/MIN/1.73
EOSINOPHIL # BLD AUTO: 0.13 10*3/MM3 (ref 0–0.4)
EOSINOPHIL NFR BLD AUTO: 0.8 % (ref 0.3–6.2)
ERYTHROCYTE [DISTWIDTH] IN BLOOD BY AUTOMATED COUNT: 12.7 % (ref 12.3–15.4)
GLOBULIN UR ELPH-MCNC: 2.8 GM/DL
GLUCOSE SERPL-MCNC: 146 MG/DL (ref 65–99)
HCG INTACT+B SERPL-ACNC: 1216 MIU/ML
HCT VFR BLD AUTO: 42.2 % (ref 34–46.6)
HGB BLD-MCNC: 13.9 G/DL (ref 12–15.9)
IMM GRANULOCYTES # BLD AUTO: 0.09 10*3/MM3 (ref 0–0.05)
IMM GRANULOCYTES NFR BLD AUTO: 0.6 % (ref 0–0.5)
INR PPP: 0.96 (ref 0.91–1.09)
LYMPHOCYTES # BLD AUTO: 2.38 10*3/MM3 (ref 0.7–3.1)
LYMPHOCYTES NFR BLD AUTO: 15.3 % (ref 19.6–45.3)
MCH RBC QN AUTO: 27.9 PG (ref 26.6–33)
MCHC RBC AUTO-ENTMCNC: 32.9 G/DL (ref 31.5–35.7)
MCV RBC AUTO: 84.6 FL (ref 79–97)
MONOCYTES # BLD AUTO: 0.71 10*3/MM3 (ref 0.1–0.9)
MONOCYTES NFR BLD AUTO: 4.6 % (ref 5–12)
NEUTROPHILS NFR BLD AUTO: 12.2 10*3/MM3 (ref 1.7–7)
NEUTROPHILS NFR BLD AUTO: 78.1 % (ref 42.7–76)
NRBC BLD AUTO-RTO: 0 /100 WBC (ref 0–0.2)
NUMBER OF DOSES: NORMAL
PLATELET # BLD AUTO: 399 10*3/MM3 (ref 140–450)
PMV BLD AUTO: 8.9 FL (ref 6–12)
POTASSIUM SERPL-SCNC: 3.9 MMOL/L (ref 3.5–5.2)
PROT SERPL-MCNC: 7 G/DL (ref 6–8.5)
PROTHROMBIN TIME: 12.9 SECONDS (ref 11.8–14.8)
RBC # BLD AUTO: 4.99 10*6/MM3 (ref 3.77–5.28)
RH BLD: POSITIVE
RH BLD: POSITIVE
SODIUM SERPL-SCNC: 139 MMOL/L (ref 136–145)
T&S EXPIRATION DATE: NORMAL
WBC NRBC COR # BLD: 15.6 10*3/MM3 (ref 3.4–10.8)

## 2023-07-19 PROCEDURE — 85610 PROTHROMBIN TIME: CPT

## 2023-07-19 PROCEDURE — 76815 OB US LIMITED FETUS(S): CPT

## 2023-07-19 PROCEDURE — 86850 RBC ANTIBODY SCREEN: CPT

## 2023-07-19 PROCEDURE — 96376 TX/PRO/DX INJ SAME DRUG ADON: CPT

## 2023-07-19 PROCEDURE — 80053 COMPREHEN METABOLIC PANEL: CPT

## 2023-07-19 PROCEDURE — 36415 COLL VENOUS BLD VENIPUNCTURE: CPT

## 2023-07-19 PROCEDURE — 25010000002 ONDANSETRON PER 1 MG

## 2023-07-19 PROCEDURE — 86901 BLOOD TYPING SEROLOGIC RH(D): CPT

## 2023-07-19 PROCEDURE — 96374 THER/PROPH/DIAG INJ IV PUSH: CPT

## 2023-07-19 PROCEDURE — 99284 EMERGENCY DEPT VISIT MOD MDM: CPT

## 2023-07-19 PROCEDURE — 96375 TX/PRO/DX INJ NEW DRUG ADDON: CPT

## 2023-07-19 PROCEDURE — 85025 COMPLETE CBC W/AUTO DIFF WBC: CPT

## 2023-07-19 PROCEDURE — 25010000002 DROPERIDOL PER 5 MG

## 2023-07-19 PROCEDURE — 85730 THROMBOPLASTIN TIME PARTIAL: CPT

## 2023-07-19 PROCEDURE — 84702 CHORIONIC GONADOTROPIN TEST: CPT

## 2023-07-19 PROCEDURE — 25010000002 MORPHINE PER 10 MG

## 2023-07-19 PROCEDURE — 25010000002 MORPHINE PER 10 MG: Performed by: STUDENT IN AN ORGANIZED HEALTH CARE EDUCATION/TRAINING PROGRAM

## 2023-07-19 PROCEDURE — 86900 BLOOD TYPING SEROLOGIC ABO: CPT

## 2023-07-19 RX ORDER — SODIUM CHLORIDE 0.9 % (FLUSH) 0.9 %
10 SYRINGE (ML) INJECTION AS NEEDED
Status: DISCONTINUED | OUTPATIENT
Start: 2023-07-19 | End: 2023-07-20 | Stop reason: HOSPADM

## 2023-07-19 RX ORDER — DROPERIDOL 2.5 MG/ML
2.5 INJECTION, SOLUTION INTRAMUSCULAR; INTRAVENOUS ONCE
Status: COMPLETED | OUTPATIENT
Start: 2023-07-19 | End: 2023-07-19

## 2023-07-19 RX ORDER — MISOPROSTOL 200 UG/1
1000 TABLET ORAL ONCE
Status: COMPLETED | OUTPATIENT
Start: 2023-07-19 | End: 2023-07-19

## 2023-07-19 RX ORDER — METHYLERGONOVINE MALEATE 0.2 MG/1
200 TABLET ORAL ONCE
Status: COMPLETED | OUTPATIENT
Start: 2023-07-19 | End: 2023-07-19

## 2023-07-19 RX ORDER — ONDANSETRON 2 MG/ML
4 INJECTION INTRAMUSCULAR; INTRAVENOUS ONCE
Status: COMPLETED | OUTPATIENT
Start: 2023-07-19 | End: 2023-07-19

## 2023-07-19 RX ADMIN — METHYLERGONOVINE MALEATE 200 MCG: 0.2 TABLET ORAL at 20:37

## 2023-07-19 RX ADMIN — Medication 4 MG: at 18:58

## 2023-07-19 RX ADMIN — DROPERIDOL 2.5 MG: 2.5 INJECTION, SOLUTION INTRAMUSCULAR; INTRAVENOUS at 20:37

## 2023-07-19 RX ADMIN — SODIUM CHLORIDE, POTASSIUM CHLORIDE, SODIUM LACTATE AND CALCIUM CHLORIDE 1000 ML: 600; 310; 30; 20 INJECTION, SOLUTION INTRAVENOUS at 19:34

## 2023-07-19 RX ADMIN — MORPHINE SULFATE 4 MG: 4 INJECTION, SOLUTION INTRAMUSCULAR; INTRAVENOUS at 18:10

## 2023-07-19 RX ADMIN — MORPHINE SULFATE 4 MG: 4 INJECTION, SOLUTION INTRAMUSCULAR; INTRAVENOUS at 18:58

## 2023-07-19 RX ADMIN — ONDANSETRON 4 MG: 2 INJECTION INTRAMUSCULAR; INTRAVENOUS at 18:09

## 2023-07-19 RX ADMIN — MISOPROSTOL 1000 MCG: 200 TABLET ORAL at 20:38

## 2023-07-19 NOTE — ED PROVIDER NOTES
Subjective   History of Present Illness  Patient is a 32-year-old female who presents to the ED today complaining of vaginal bleeding in pregnancy.  She reports she began bleeding on Saturday and presented to a facility in [place] where she had an ultrasound performed that confirmed that she is having a miscarriage.  She states today around 3 PM the bleeding became very heavy and she began having severe pelvic cramping.  On presentation she is in acute distress, writhing in pain.  It is difficult to get history from the patient at this time as she is in a significant amount of pain and is continually asking for pain relief.  Her blood pressure is elevated at 155/89, heart rate is normal.  States her vaginal bleeding has been dark red with clots noted. Bleeding through >1 pad/hr. Last menstrual period was on , she is about 12 weeks pregnant per her report.  She is .  Her spouse reports she is scheduled for a D&C on  with Dr. Cordero in [place] at [facility].  PMH is significant for anxiety, depression, hypertension, GERD, and insomnia.  Differential diagnoses: Incomplete miscarriage and other.     History provided by:  Patient   used: No      Review of Systems   Constitutional: Negative.    HENT: Negative.     Eyes: Negative.    Respiratory: Negative.     Cardiovascular: Negative.    Gastrointestinal:  Positive for abdominal pain and nausea. Negative for vomiting.   Genitourinary:  Positive for pelvic pain, vaginal bleeding and vaginal pain. Negative for dysuria and flank pain.   Musculoskeletal:  Positive for back pain.   Skin: Negative.    Neurological:  Negative for dizziness, syncope, weakness, light-headedness, numbness and headaches.   Psychiatric/Behavioral: Negative.       Past Medical History:   Diagnosis Date    ADD (attention deficit disorder)     Anxiety     Constipation     Depression     Diarrhea     Essential hypertension 2018    Heart burn      Insomnia     Pain     shoulder, knee, back       Allergies   Allergen Reactions    Lisinopril Cough    Norco [Hydrocodone-Acetaminophen] Nausea Only and Unknown (See Comments)     Stomach pain    Penicillins Hives and Nausea And Vomiting       Past Surgical History:   Procedure Laterality Date    CHOLECYSTECTOMY      lap    ENDOSCOPY N/A 2018    Procedure: ESOPHAGOGASTRODUODENOSCOPY WITH ANESTHESIA;  Surgeon: Benny Rueda MD;  Location: [facility] ENDOSCOPY;  Service: General    WISDOM TOOTH EXTRACTION         Family History   Problem Relation Age of Onset    No Known Problems Mother     No Known Problems Father     Heart disease Maternal Grandmother     Cancer Maternal Grandmother     Stroke Maternal Grandmother     Diabetes Maternal Grandmother        Social History     Socioeconomic History    Marital status:    Tobacco Use    Smoking status: Every Day     Packs/day: 0.10     Years: 10.00     Pack years: 1.00     Types: Cigarettes     Start date: 2002     Last attempt to quit: 9/10/2018     Years since quittin.8    Smokeless tobacco: Never    Tobacco comments:     it is hard, stresses her out   Substance and Sexual Activity    Alcohol use: No    Drug use: Not Currently    Sexual activity: Yes     Partners: Female     Birth control/protection: None       Objective   Physical Exam  Vitals and nursing note reviewed. Exam conducted with a chaperone present.   Constitutional:       General: She is in acute distress.      Appearance: Normal appearance. She is not toxic-appearing or diaphoretic.      Comments: Writhing in pain   HENT:      Head: Normocephalic and atraumatic.      Right Ear: External ear normal.      Left Ear: External ear normal.      Nose: Nose normal.   Eyes:      Extraocular Movements: Extraocular movements intact.      Conjunctiva/sclera: Conjunctivae normal.      Pupils: Pupils are equal, round, and reactive to light.   Cardiovascular:      Rate and Rhythm: Normal rate and  regular rhythm.      Pulses: Normal pulses.           Radial pulses are 2+ on the right side and 2+ on the left side.      Heart sounds: Normal heart sounds.   Pulmonary:      Effort: Pulmonary effort is normal.      Breath sounds: Normal breath sounds.   Abdominal:      General: Bowel sounds are normal. There is no distension.      Palpations: Abdomen is soft.      Tenderness: There is abdominal tenderness in the suprapubic area. There is no left CVA tenderness, guarding or rebound.   Genitourinary:     Exam position: Lithotomy position.      Vagina: Bleeding present.      Cervix: Dilated. Cervical bleeding present.      Uterus: Tender.       Adnexa:         Right: Tenderness present.         Left: Tenderness present.       Comments: Moderate amount of blood noted in the vaginal canal with clots; clots were cleared and cervical os was visualized, does appear to be open with a steady trickle of blood noted. Tenderness noted throughout exam.   Musculoskeletal:      Cervical back: Normal range of motion.   Skin:     General: Skin is warm and dry.      Capillary Refill: Capillary refill takes less than 2 seconds.   Neurological:      Mental Status: She is alert and oriented to person, place, and time. Mental status is at baseline.      GCS: GCS eye subscore is 4. GCS verbal subscore is 5. GCS motor subscore is 6.   Psychiatric:         Mood and Affect: Mood normal.         Behavior: Behavior normal.         Thought Content: Thought content normal.         Judgment: Judgment normal.     Labs Reviewed   COMPREHENSIVE METABOLIC PANEL - Abnormal; Notable for the following components:       Result Value    Glucose 146 (*)     CO2 21.0 (*)     All other components within normal limits    Narrative:     GFR Normal >60  Chronic Kidney Disease <60  Kidney Failure <15     CBC WITH AUTO DIFFERENTIAL - Abnormal; Notable for the following components:    WBC 15.60 (*)     Neutrophil % 78.1 (*)     Lymphocyte % 15.3 (*)     Monocyte  % 4.6 (*)     Immature Grans % 0.6 (*)     Neutrophils, Absolute 12.20 (*)     Immature Grans, Absolute 0.09 (*)     All other components within normal limits   APTT - Normal   PROTIME-INR - Normal   HCG, QUANTITATIVE, PREGNANCY    Narrative:     HCG Ranges by Gestational Age    Females - non-pregnant premenopausal   </= 1mIU/mL HCG  Females - postmenopausal               </= 7mIU/mL HCG    3 Weeks         5.8 -    71.2 mIU/mL  4 Weeks         9.5 -     750 mIU/mL  5 Weeks         217 -   7,138 mIU/mL  6 Weeks         158 -  31,795 mIU/mL  7 Weeks       3,697 - 163,563 mIU/mL  8 Weeks      32,065 - 149,571 mIU/mL  9 Weeks      63,803 - 151,410 mIU/mL  10 Weeks     46,509 - 186,977 mIU/mL  12 Weeks     27,832 - 210,612 mIU/mL  14 Weeks     13,950 -  62,530 mIU/mL  15 Weeks     12,039 -  70,971 mIU/mL  16 Weeks      9,040 -  56,451 mIU/mL  17 Weeks      8,175 -  55,868 mIU/mL  18 Weeks      8,099 -  58,176 mIU/mL    RHIG EVALUATION   DOSES OF RH IMMUNE GLOBULIN   TYPE AND SCREEN   CBC AND DIFFERENTIAL    Narrative:     The following orders were created for panel order CBC & Differential.  Procedure                               Abnormality         Status                     ---------                               -----------         ------                     CBC Auto Differential[[id number]]        Abnormal            Final result                 Please view results for these tests on the individual orders.     US Ob Limited 1 + Fetuses   Final Result   FINDING/IMPRESSION:       Significantly limited exam, of limited diagnostic value. This is   secondary to patient currently being in distress and refusing   transvaginal exam and requesting transabdominal exam be stopped prior to   obtaining all the necessary images.       The provided images demonstrate a fluid collection in the lower uterine   segment which is nonspecific but could represent miscarriage in   progress. No definite intrauterine fetus  identified on the provided   images.   This report was finalized on 07/19/2023 19:39 by Dr. Alejandro Dickerson MD.      US Ob Limited 1 + Fetuses    (Results Pending)     Medications   morphine injection 4 mg (4 mg Intravenous Given 7/19/23 1810)   ondansetron (ZOFRAN) injection 4 mg (4 mg Intravenous Given 7/19/23 1809)   morphine injection 4 mg (4 mg Intravenous Given 7/19/23 1858)   lactated ringers bolus 1,000 mL (0 mL Intravenous Stopped 7/19/23 2051)   methylergonovine (METHERGINE) tablet 200 mcg (200 mcg Oral Given 7/19/23 2037)   droperidol (INAPSINE) injection 2.5 mg (2.5 mg Intravenous Given 7/19/23 2037)   miSOPROStol (CYTOTEC) tablet 1,000 mcg (1,000 mcg Rectal Given 7/19/23 2038)     Procedures           ED Course  ED Course as of 07/20/23 0322   Wed Jul 19, 2023   1851 I performed a pelvic exam at the bedside at this time.  On exam the patient continues to writhing pain, there is tenderness noted during the entire procedure.  She does have a moderate amount of bleeding noted in the vaginal canal with multiple clots, I was able to clear these and visualize the cervical os which appears to be open.  She does have a steady trickle of blood coming from the cervical os at this time. [HE]   1933 I have received a call from the ultrasound tech at this time he reports that the patient has refused her transvaginal ultrasound, she did agree to a few pictures via external ultrasound.  Ultrasound tech states she has spoken to the radiologist and they do feel that the sac is low in the pelvis, likely about to pass based on the few images that they were able to obtain. [HE]   1954 I have discussed the pt's case with OBGYN on call, Dr. Funk who recommends Methergine, pain control, and repeat US.  [HE]   2115 Will obtain repeat US per Dr. Funk's request, on reeval patient reports she is feeling better as far as her pain is concerned.  She feels that she will be able to tolerate a transvaginal ultrasound at this  time.  Nursing staff is reported to me that they feel the patient has likely passed the gestational sac as they did visualize this about 30 minutes ago. [HE]   Thu 2023   0016 US OB Limited:  The patient describes that she previously had a fluid collection within the lower uterine segment on her prior ultrasound which is not available for review.  On the current study there is no intrauterine pregnancy.  Endometrium is slightly prominent measuring 1.4 cm which is within normal limits on a menstruating female.  The adnexa are unremarkable without sonographic evidence to suggest an ectopic pregnancy.  Impression:  Findings are concerning for a missed .  Interpreted by Pradip Ferrer MD. [HE]   0048 I have again discussed the patient's case with OB/GYN on-call, Dr. Funk who feels the patient can safely be discharged home at this time given her most recent ultrasound read.  Patient's bleeding has subsided significantly and her H&H is normal. Dr. Funk recommends ibuprofen for pain at home and close OB/GYN follow-up.  She will keep her appointment with her OB/GYN for her D&C scheduled for the . [HE]   0056 Results discussed at bedside with the patient.  She does feel comfortable going home at this point.  States her pain is definitely under better control at this time.  She will follow-up with her OB/GYN in the morning for further instruction. [HE]      ED Course User Index  [HE] Alyssa Garcia APRN                                           Medical Decision Making  Patient is a 32-year-old female who presents to the ED today complaining of vaginal bleeding in pregnancy.  She reports she began bleeding on Saturday and presented to a facility in [place] where she had an ultrasound performed that confirmed that she is having a miscarriage.  She states today around 3 PM the bleeding became very heavy and she began having severe pelvic cramping.  On presentation she is in acute  distress, writhing in pain.  It is difficult to get history from the patient at this time as she is in a significant amount of pain and is continually asking for pain relief.  Her blood pressure is elevated at 155/89, heart rate is normal.  States her vaginal bleeding has been dark red with clots noted. Bleeding through >1 pad/hr. Last menstrual period was on , she is about 12 weeks pregnant per her report.  She is .  Her spouse reports she is scheduled for a D&C on  with Dr. Cordero in [place] at [facility].  PMH is significant for anxiety, depression, hypertension, GERD, and insomnia.  Differential diagnoses: Incomplete miscarriage and other.     Patient was given IV fluids, zofran, morphine, methergine, droperidol, and cytotec in the ED.     Hcg quant is 1,216.   Blood type is O positive, no need for RhoGam.   Co-ags normal.   CMP reveals normal renal and hepatic function.   CBC reveals elevated WBC at 15.6, normal H&H and platelet count.   UA was ordered but not obtained by nursing staff due to specimen contaminated heavily with blood.    Initial US was unable to be adequately obtained via US tech report. She states the patient was unable to tolerate the procedure due to her pain level. The patient did agree to a few transabdominal US pictures, but the procedure was stopped prematurely.   Initial US reveals significantly limited exam, of limited diagnostic value. This is  secondary to patient currently being in distress and refusing  transvaginal exam and requesting transabdominal exam be stopped prior to  obtaining all the necessary images.  The provided images demonstrate a fluid collection in the lower uterine  segment which is nonspecific but could represent miscarriage in  progress. No definite intrauterine fetus identified on the provided  images.    I performed a pelvic exam at the bedside at this time.  On exam the patient continues to writhing pain, there is tenderness  noted during the entire procedure.  She does have a moderate amount of bleeding noted in the vaginal canal with multiple clots, I was able to clear these and visualize the cervical os which appears to be open.  She does have a steady trickle of blood coming from the cervical os at this time.    I have discussed the pt's case with OBGYN on call, Dr. Funk who recommends Methergine, Cytotec, further pain control, and repeat US.     Will obtain repeat US per Dr. Funk's request, on reeval patient reports she is feeling better as far as her pain is concerned.  She feels that she will be able to tolerate a transvaginal ultrasound at this time.  Nursing staff is reported to me that they feel the patient has likely passed the gestational sac as they did visualize this about 30 minutes ago.    US OB Limited (subsequent):  The patient describes that she previously had a fluid collection within the lower uterine segment on her prior ultrasound which is not available for review.  On the current study there is no intrauterine pregnancy.  Endometrium is slightly prominent measuring 1.4 cm which is within normal limits on a menstruating female.  The adnexa are unremarkable without sonographic evidence to suggest an ectopic pregnancy.  Impression:  Findings are concerning for a missed .  Interpreted by Pradip Ferrer MD.    I have again discussed the patient's case with OB/GYN on-call, Dr. Funk who feels the patient can safely be discharged home at this time given her most recent ultrasound read.  Patient's bleeding has subsided significantly and her H&H is normal. Dr. Funk recommends ibuprofen for pain at home and close OB/GYN follow-up.  She will keep her appointment with her OB/GYN for her D&C scheduled for the .  Results discussed at bedside with the patient. Case discussed with Dr. Betancur. She does feel comfortable going home at this point.  States her pain is definitely under better control  at this time.  She will follow-up with her OB/GYN in the morning for further instruction.  VS reassuring, return precautions given, pt agreeable and appreciative, discharged in stable condition.     Problems Addressed:  Spontaneous : complicated acute illness or injury    Amount and/or Complexity of Data Reviewed  Labs: ordered.    Risk  Prescription drug management.        Final diagnoses:   Spontaneous        ED Disposition  ED Disposition       ED Disposition   Discharge    Condition   Stable    Comment   --               Spencer Falcon,   [address]  [address]  [facility] [id number] [phone]               Medication List        New Prescriptions      ibuprofen 600 MG tablet  Commonly known as: ADVIL,MOTRIN  Take 1 tablet by mouth 3 (Three) Times a Day As Needed for Mild Pain.               Where to Get Your Medications        These medications were sent to [id number] - [place], [place] - [address] AT [facility] [address] - [phone]  - [phone] FX  [address], [facility] [address]      Phone: [phone]   ibuprofen 600 MG tablet            Alyssa Garcia, APRN  23 [id number]

## 2023-07-20 VITALS
TEMPERATURE: 98.3 F | HEART RATE: 70 BPM | DIASTOLIC BLOOD PRESSURE: 69 MMHG | BODY MASS INDEX: 44.41 KG/M2 | HEIGHT: 68 IN | WEIGHT: 293 LBS | RESPIRATION RATE: 18 BRPM | OXYGEN SATURATION: 95 % | SYSTOLIC BLOOD PRESSURE: 151 MMHG

## 2023-07-20 RX ORDER — IBUPROFEN 600 MG/1
600 TABLET ORAL 3 TIMES DAILY PRN
Qty: 30 TABLET | Refills: 0 | Status: SHIPPED | OUTPATIENT
Start: 2023-07-20

## 2023-07-20 NOTE — DISCHARGE INSTRUCTIONS
Today you were seen in the ED for your symptoms.  I have discussed your case with the OB/GYN on-call.  She recommends outpatient follow-up with your established OB/GYN, you should keep your appointment for your D&C.  I am sending in ibuprofen for pain.  Please return to the ED with any new, worsening, or persistent symptoms and touch base with your PCP in the next 24 to 48 hours.  I do also recommend pelvic rest.

## 2023-07-20 NOTE — ED NOTES
Upon returning from US pt reports increasing pain and requesting pain medicine at this time, Alyssa IVY  notified, no new orders at this time

## 2024-07-29 LAB
HEP B, EXTERNAL RESULT: NEGATIVE
HEPATITIS C ANTIBODY, EXTERNAL RESULT: NEGATIVE
HIV, EXTERNAL RESULT: NON REACTIVE
RPR, EXTERNAL RESULT: NON REACTIVE
RUBELLA TITER, EXTERNAL RESULT: NORMAL

## 2024-08-01 LAB
C. TRACHOMATIS, EXTERNAL RESULT: NEGATIVE
N. GONORRHOEAE, EXTERNAL RESULT: NEGATIVE

## 2024-12-30 ENCOUNTER — HOSPITAL ENCOUNTER (OUTPATIENT)
Age: 34
Discharge: HOME | End: 2024-12-30
Attending: OBSTETRICS & GYNECOLOGY | Admitting: OBSTETRICS & GYNECOLOGY
Payer: MEDICAID

## 2024-12-30 PROBLEM — Z3A.32 32 WEEKS GESTATION OF PREGNANCY: Status: ACTIVE | Noted: 2024-12-30

## 2024-12-30 PROCEDURE — 59025 FETAL NON-STRESS TEST: CPT

## 2024-12-30 PROCEDURE — 99212 OFFICE O/P EST SF 10 MIN: CPT

## 2024-12-30 RX ORDER — NIFEDIPINE 30 MG/1
30 TABLET, EXTENDED RELEASE ORAL DAILY
COMMUNITY

## 2024-12-30 NOTE — DISCHARGE INSTRUCTIONS
LABOR AND DELIVERY - OBSERVATION DISCHARGE INSTRUCTIONS    TERM LABOR: RETURN TO HOSPITAL IF:  _x_There is a leaking or sudden gush of fluid from the vagina (note color, odor, time and amount of fluid)    _x_ You have bright red vaginal bleeding    _x_You begin to have regular contractions, occuring every 4-5 minutes, each contraction lasting 45-60 seconds for one hour. Time contractions from beginning of one to the beginning of the next. True contractions have a regular rate and become progressively closer. They are not changed by position change and are usually more uncomfortable when walking.    PRE-TERM LABOR  _x_Your gestational age is 32 weeks: term pregnancy starts at 37 weeks.  __Fill your prescription and take as directed.  __Bed rest (left lying position is best).  __Refrain from sexual intercourse until you see your physician again.  __No heavy lifting or strenuous activity.    RETURN TO HOSPITAL IF:  _x_Contractions occur 3-4 in any hour (every 10-15 minutes), maybe with or without pain.  _x_Rhythmic or constant menstrual-like cramps  _x_Rhythmic or constant lower, dull backache may radiate to the sides or front. Increase or change in vaginal discharge, may be watery, pink, red or brown-tinged.    PRE-ECLAMPSIA  __Bed rest, left side lying position  __Elevate legs while sitting  __Maintain quiet, peaceful environment  __Eat well-balanced meals, no added salt, avoid processed lunch meats, canned soups, potato chips, etc.    SYMPTOMS THAT REQUIRE PROMPT REPORTING:  __Rapid gain in weight  __Persistent or severe headache  __Visual disturbances (spots, blurred)  __Nausea/vomiting, with or without upper abdominal pain.  __Increase or persistent swelling (face puffiness, hands, legs, ankles)  __Decreased urinary output  __Drowsiness listlessness        OTHER INSTRUCTIONS  _x_Keep appointment to see your attending physician        NOTE: If you do not begin to feel better, or you have any questions, contact your

## 2024-12-30 NOTE — PROGRESS NOTES
Pt  here for scheduled NST.  Denies vaginal leaking or bleeding, reports positive fetal movement.    EFM applied.

## 2025-01-09 ENCOUNTER — HOSPITAL ENCOUNTER (OUTPATIENT)
Age: 35
Discharge: HOME | End: 2025-01-09
Attending: OBSTETRICS & GYNECOLOGY | Admitting: OBSTETRICS & GYNECOLOGY
Payer: COMMERCIAL

## 2025-01-09 VITALS
TEMPERATURE: 97.1 F | HEIGHT: 70 IN | WEIGHT: 293 LBS | RESPIRATION RATE: 18 BRPM | OXYGEN SATURATION: 100 % | HEART RATE: 79 BPM | BODY MASS INDEX: 41.95 KG/M2 | DIASTOLIC BLOOD PRESSURE: 81 MMHG | SYSTOLIC BLOOD PRESSURE: 131 MMHG

## 2025-01-09 PROBLEM — Z3A.34 34 WEEKS GESTATION OF PREGNANCY: Status: ACTIVE | Noted: 2025-01-09

## 2025-01-09 LAB
ALBUMIN SERPL-MCNC: 3.3 G/DL (ref 3.5–5.2)
ALP SERPL-CCNC: 90 U/L (ref 35–104)
ALT SERPL-CCNC: 9 U/L (ref 5–33)
ANION GAP SERPL CALCULATED.3IONS-SCNC: 15 MMOL/L (ref 7–19)
AST SERPL-CCNC: 12 U/L (ref 5–32)
BASOPHILS # BLD: 0.1 K/UL (ref 0–0.2)
BASOPHILS NFR BLD: 0.8 % (ref 0–1)
BILIRUB SERPL-MCNC: 0.2 MG/DL (ref 0.2–1.2)
BUN SERPL-MCNC: 7 MG/DL (ref 6–20)
CALCIUM SERPL-MCNC: 9.1 MG/DL (ref 8.6–10)
CHLORIDE SERPL-SCNC: 103 MMOL/L (ref 98–111)
CO2 SERPL-SCNC: 19 MMOL/L (ref 22–29)
CREAT SERPL-MCNC: 0.4 MG/DL (ref 0.5–0.9)
CREAT UR-MCNC: 203.6 MG/DL (ref 28–217)
EOSINOPHIL # BLD: 0.3 K/UL (ref 0–0.6)
EOSINOPHIL NFR BLD: 2.1 % (ref 0–5)
ERYTHROCYTE [DISTWIDTH] IN BLOOD BY AUTOMATED COUNT: 11.9 % (ref 11.5–14.5)
GLUCOSE BLD-MCNC: 89 MG/DL (ref 70–99)
GLUCOSE SERPL-MCNC: 99 MG/DL (ref 70–99)
HCT VFR BLD AUTO: 38 % (ref 37–47)
HGB BLD-MCNC: 12.9 G/DL (ref 12–16)
IMM GRANULOCYTES # BLD: 0.1 K/UL
LYMPHOCYTES # BLD: 2.6 K/UL (ref 1.1–4.5)
LYMPHOCYTES NFR BLD: 20.3 % (ref 20–40)
MCH RBC QN AUTO: 28.9 PG (ref 27–31)
MCHC RBC AUTO-ENTMCNC: 33.9 G/DL (ref 33–37)
MCV RBC AUTO: 85.2 FL (ref 81–99)
MONOCYTES # BLD: 0.8 K/UL (ref 0–0.9)
MONOCYTES NFR BLD: 6.2 % (ref 0–10)
NEUTROPHILS # BLD: 9.1 K/UL (ref 1.5–7.5)
NEUTS SEG NFR BLD: 69.9 % (ref 50–65)
PERFORMED ON: NORMAL
PLATELET # BLD AUTO: 366 K/UL (ref 130–400)
PMV BLD AUTO: 9.3 FL (ref 9.4–12.3)
POTASSIUM SERPL-SCNC: 3.9 MMOL/L (ref 3.5–5)
PROT SERPL-MCNC: 6.4 G/DL (ref 6.4–8.3)
PROT UR-MCNC: 25 MG/DL (ref 0–12)
RBC # BLD AUTO: 4.46 M/UL (ref 4.2–5.4)
SODIUM SERPL-SCNC: 137 MMOL/L (ref 136–145)
URATE SERPL-MCNC: 4.8 MG/DL (ref 2.4–5.7)
WBC # BLD AUTO: 13 K/UL (ref 4.8–10.8)

## 2025-01-09 PROCEDURE — 82570 ASSAY OF URINE CREATININE: CPT

## 2025-01-09 PROCEDURE — 85025 COMPLETE CBC W/AUTO DIFF WBC: CPT

## 2025-01-09 PROCEDURE — 36415 COLL VENOUS BLD VENIPUNCTURE: CPT

## 2025-01-09 PROCEDURE — 82962 GLUCOSE BLOOD TEST: CPT

## 2025-01-09 PROCEDURE — 80053 COMPREHEN METABOLIC PANEL: CPT

## 2025-01-09 PROCEDURE — 84550 ASSAY OF BLOOD/URIC ACID: CPT

## 2025-01-09 PROCEDURE — 6370000000 HC RX 637 (ALT 250 FOR IP): Performed by: OBSTETRICS & GYNECOLOGY

## 2025-01-09 PROCEDURE — 99213 OFFICE O/P EST LOW 20 MIN: CPT

## 2025-01-09 PROCEDURE — 84156 ASSAY OF PROTEIN URINE: CPT

## 2025-01-09 RX ORDER — NIFEDIPINE 30 MG/1
30 TABLET, EXTENDED RELEASE ORAL ONCE
Status: COMPLETED | OUTPATIENT
Start: 2025-01-09 | End: 2025-01-09

## 2025-01-09 RX ORDER — ACETAMINOPHEN 325 MG/1
650 TABLET ORAL EVERY 4 HOURS PRN
Status: DISCONTINUED | OUTPATIENT
Start: 2025-01-09 | End: 2025-01-09 | Stop reason: HOSPADM

## 2025-01-09 RX ORDER — INSULIN GLARGINE 100 [IU]/ML
40 INJECTION, SOLUTION SUBCUTANEOUS NIGHTLY
COMMUNITY
Start: 2024-11-14

## 2025-01-09 RX ADMIN — NIFEDIPINE 30 MG: 30 TABLET, EXTENDED RELEASE ORAL at 13:24

## 2025-01-09 RX ADMIN — ACETAMINOPHEN 650 MG: 325 TABLET ORAL at 11:44

## 2025-01-09 ASSESSMENT — PAIN DESCRIPTION - DESCRIPTORS: DESCRIPTORS: ACHING

## 2025-01-09 ASSESSMENT — PAIN SCALES - GENERAL: PAINLEVEL_OUTOF10: 9

## 2025-01-09 ASSESSMENT — PAIN DESCRIPTION - LOCATION: LOCATION: HEAD

## 2025-01-09 NOTE — FLOWSHEET NOTE
Discharge teaching completed, all questions and concerns addressed. Pt instructed on how to complete 24 hour urine collection.

## 2025-01-09 NOTE — FLOWSHEET NOTE
Dr. Wilde at desk reviewing chart. Orders to admit for 24 hour urine collection. This nurse to bedside to inform patient of POC. Pt reports that she is unable to stay overnight tonight due to weather forecast and that she has the only car with her, they live an hour away and her  does not have a car with him at work. Dr. Wilde to bedside to discuss POC with patient. Dr. Wilde explained to patient the risks of pre-eclampsia to her and her baby and that if she were to leave it would be against medical advice. After discussing further with patient, Dr. Wilde ordered procardia XL 30mg to be given now to attempt to get BP under control. Pt agrees to stay for procardia and to watch until pressures are better controlled.

## 2025-01-09 NOTE — FLOWSHEET NOTE
Pt sent to L&D from [facility] after having elevated BP at her appointment this morning. Orders from Dr. Wilde for PI labs. Pt reports no contractions, LOF, or vaginal bleeding and +fm. Pt has headache since this morning rating pain 5/10. No blurry vision, N/V, or epigastric pain reported. EFM and toco applied to soft nontender abdomen. FHT audible in the 160s. No contractions palpated. VS obtained and cuff set to take serial BP q 15min. Call light within reach.

## 2025-01-09 NOTE — DISCHARGE INSTRUCTIONS
LABOR AND DELIVERY - OBSERVATION DISCHARGE INSTRUCTIONS    TERM LABOR: RETURN TO HOSPITAL IF:  __There is a leaking or sudden gush of fluid from the vagina (note color, odor, time and amount of fluid)    __ You have bright red vaginal bleeding    __You begin to have regular contractions, occuring every 10 minutes, each contraction lasting 45-60 seconds for one hour. Time contractions from beginning of one to the beginning of the next. True contractions have a regular rate and become progressively closer. They are not changed by position change and are usually more uncomfortable when walking.    PRE-TERM LABOR  _x_Your gestational age is 34 weeks: term pregnancy starts at 37 weeks.  __Fill your prescription and take as directed.  __Bed rest (left lying position is best).  __Refrain from sexual intercourse until you see your physician again.  __No heavy lifting or strenuous activity.    RETURN TO HOSPITAL IF:  _x_Contractions occur 3-4 in any hour (every 10-15 minutes), maybe with or without pain.  _x_Rhythmic or constant menstrual-like cramps  _x_Rhythmic or constant lower, dull backache may radiate to the sides or front. Increase or change in vaginal discharge, may be watery, pink, red or brown-tinged.    PRE-ECLAMPSIA  __Bed rest, left side lying position  _x_Elevate legs while sitting  _x_Maintain quiet, peaceful environment  _x_Eat well-balanced meals, no added salt, avoid processed lunch meats, canned soups, potato chips, etc.    SYMPTOMS THAT REQUIRE PROMPT REPORTING:  _x_Rapid gain in weight  _x_Persistent or severe headache  _x_Visual disturbances (spots, blurred)  _x_Nausea/vomiting, with or without upper abdominal pain.  _x_Increase or persistent swelling (face puffiness, hands, legs, ankles)  _x_Decreased urinary output  _x_Drowsiness listlessness    NAUSEA/VOMITING HYPEREMESIS  __Eat small, frequent meals instead of three large meals  __Drink liquids (such as 7-up or ginger ale) between meals instead of

## 2025-01-13 DIAGNOSIS — O13.3 GESTATIONAL HTN, THIRD TRIMESTER: ICD-10-CM

## 2025-01-13 DIAGNOSIS — Z3A.34 34 WEEKS GESTATION OF PREGNANCY: Primary | ICD-10-CM

## 2025-01-13 DIAGNOSIS — Z3A.34 34 WEEKS GESTATION OF PREGNANCY: ICD-10-CM

## 2025-01-13 LAB
CREAT 24H UR-MRATE: 2 G/24HR (ref 1–2)
PROT 24H UR-MRATE: 234 MG/24HR (ref 50–100)
SPECIMEN VOL 24H UR: 1950 ML

## 2025-01-20 ENCOUNTER — HOSPITAL ENCOUNTER (OUTPATIENT)
Age: 35
Discharge: HOME | End: 2025-01-20
Attending: OBSTETRICS & GYNECOLOGY | Admitting: OBSTETRICS & GYNECOLOGY
Payer: COMMERCIAL

## 2025-01-20 ENCOUNTER — APPOINTMENT (OUTPATIENT)
Dept: ULTRASOUND IMAGING | Age: 35
End: 2025-01-20
Payer: COMMERCIAL

## 2025-01-20 VITALS
RESPIRATION RATE: 18 BRPM | HEIGHT: 70 IN | OXYGEN SATURATION: 97 % | WEIGHT: 293 LBS | DIASTOLIC BLOOD PRESSURE: 85 MMHG | TEMPERATURE: 98.5 F | HEART RATE: 96 BPM | BODY MASS INDEX: 41.95 KG/M2 | SYSTOLIC BLOOD PRESSURE: 133 MMHG

## 2025-01-20 PROBLEM — O16.3 HYPERTENSION COMPLICATING PREGNANCY, THIRD TRIMESTER: Status: ACTIVE | Noted: 2025-01-20

## 2025-01-20 LAB
ALBUMIN SERPL-MCNC: 3.1 G/DL (ref 3.5–5.2)
ALP SERPL-CCNC: 87 U/L (ref 35–104)
ALT SERPL-CCNC: 9 U/L (ref 5–33)
ANION GAP SERPL CALCULATED.3IONS-SCNC: 15 MMOL/L (ref 7–19)
AST SERPL-CCNC: 12 U/L (ref 5–32)
BASOPHILS # BLD: 0.1 K/UL (ref 0–0.2)
BASOPHILS NFR BLD: 0.6 % (ref 0–1)
BILIRUB SERPL-MCNC: <0.2 MG/DL (ref 0.2–1.2)
BUN SERPL-MCNC: 10 MG/DL (ref 6–20)
CALCIUM SERPL-MCNC: 9.2 MG/DL (ref 8.6–10)
CHLORIDE SERPL-SCNC: 102 MMOL/L (ref 98–111)
CO2 SERPL-SCNC: 19 MMOL/L (ref 22–29)
CREAT SERPL-MCNC: 0.5 MG/DL (ref 0.5–0.9)
CREAT UR-MCNC: 267.3 MG/DL (ref 28–217)
EOSINOPHIL # BLD: 0.3 K/UL (ref 0–0.6)
EOSINOPHIL NFR BLD: 2.3 % (ref 0–5)
ERYTHROCYTE [DISTWIDTH] IN BLOOD BY AUTOMATED COUNT: 12.2 % (ref 11.5–14.5)
GLUCOSE SERPL-MCNC: 156 MG/DL (ref 70–99)
HCT VFR BLD AUTO: 37.9 % (ref 37–47)
HGB BLD-MCNC: 12.6 G/DL (ref 12–16)
IMM GRANULOCYTES # BLD: 0.1 K/UL
LYMPHOCYTES # BLD: 2.5 K/UL (ref 1.1–4.5)
LYMPHOCYTES NFR BLD: 19.8 % (ref 20–40)
MCH RBC QN AUTO: 28.9 PG (ref 27–31)
MCHC RBC AUTO-ENTMCNC: 33.2 G/DL (ref 33–37)
MCV RBC AUTO: 86.9 FL (ref 81–99)
MONOCYTES # BLD: 0.7 K/UL (ref 0–0.9)
MONOCYTES NFR BLD: 5.6 % (ref 0–10)
NEUTROPHILS # BLD: 8.8 K/UL (ref 1.5–7.5)
NEUTS SEG NFR BLD: 71.1 % (ref 50–65)
PLATELET # BLD AUTO: 345 K/UL (ref 130–400)
PMV BLD AUTO: 9.4 FL (ref 9.4–12.3)
POTASSIUM SERPL-SCNC: 3.6 MMOL/L (ref 3.5–5)
PROT SERPL-MCNC: 5.9 G/DL (ref 6.4–8.3)
PROT UR-MCNC: 33 MG/DL (ref 0–12)
RBC # BLD AUTO: 4.36 M/UL (ref 4.2–5.4)
SODIUM SERPL-SCNC: 136 MMOL/L (ref 136–145)
URATE SERPL-MCNC: 5 MG/DL (ref 2.4–5.7)
WBC # BLD AUTO: 12.4 K/UL (ref 4.8–10.8)

## 2025-01-20 PROCEDURE — 99213 OFFICE O/P EST LOW 20 MIN: CPT

## 2025-01-20 PROCEDURE — 82570 ASSAY OF URINE CREATININE: CPT

## 2025-01-20 PROCEDURE — 36415 COLL VENOUS BLD VENIPUNCTURE: CPT

## 2025-01-20 PROCEDURE — 84156 ASSAY OF PROTEIN URINE: CPT

## 2025-01-20 PROCEDURE — 80053 COMPREHEN METABOLIC PANEL: CPT

## 2025-01-20 PROCEDURE — 85025 COMPLETE CBC W/AUTO DIFF WBC: CPT

## 2025-01-20 PROCEDURE — 76819 FETAL BIOPHYS PROFIL W/O NST: CPT

## 2025-01-20 PROCEDURE — 84550 ASSAY OF BLOOD/URIC ACID: CPT

## 2025-01-20 NOTE — FLOWSHEET NOTE
Went over all discharge instructions with pt. Pt verbalized understanding. Pt ambulatory out of OB with her significant other.

## 2025-01-27 ENCOUNTER — HOSPITAL ENCOUNTER (INPATIENT)
Age: 35
LOS: 4 days | Discharge: HOME | End: 2025-01-31
Attending: OBSTETRICS & GYNECOLOGY | Admitting: OBSTETRICS & GYNECOLOGY
Payer: COMMERCIAL

## 2025-01-27 DIAGNOSIS — Z98.891 S/P C-SECTION: Primary | ICD-10-CM

## 2025-01-27 PROBLEM — Z3A.36 36 WEEKS GESTATION OF PREGNANCY: Status: ACTIVE | Noted: 2025-01-27

## 2025-01-27 LAB
ABO/RH: NORMAL
ALBUMIN SERPL-MCNC: 3.4 G/DL (ref 3.5–5.2)
ALP SERPL-CCNC: 92 U/L (ref 35–104)
ALT SERPL-CCNC: 10 U/L (ref 5–33)
AMPHET UR QL SCN: NEGATIVE
ANION GAP SERPL CALCULATED.3IONS-SCNC: 16 MMOL/L (ref 8–16)
ANTIBODY SCREEN: NORMAL
AST SERPL-CCNC: 21 U/L (ref 5–32)
BACTERIA URNS QL MICRO: NEGATIVE /HPF
BARBITURATES UR QL SCN: NEGATIVE
BENZODIAZ UR QL SCN: NEGATIVE
BILIRUB SERPL-MCNC: <0.2 MG/DL (ref 0.2–1.2)
BILIRUB UR QL STRIP: NEGATIVE
BUN SERPL-MCNC: 7 MG/DL (ref 6–20)
BUPRENORPHINE URINE: NEGATIVE
CALCIUM SERPL-MCNC: 9.2 MG/DL (ref 8.6–10)
CANNABINOIDS UR QL SCN: NEGATIVE
CHLORIDE SERPL-SCNC: 104 MMOL/L (ref 98–107)
CLARITY UR: CLEAR
CO2 SERPL-SCNC: 17 MMOL/L (ref 22–29)
COCAINE UR QL SCN: NEGATIVE
COLOR UR: YELLOW
CREAT SERPL-MCNC: 0.5 MG/DL (ref 0.5–0.9)
CREAT UR-MCNC: 115.5 MG/DL (ref 28–217)
CRYSTALS URNS MICRO: NORMAL /HPF
DRUG SCREEN COMMENT UR-IMP: NORMAL
EPI CELLS #/AREA URNS AUTO: 4 /HPF (ref 0–5)
ERYTHROCYTE [DISTWIDTH] IN BLOOD BY AUTOMATED COUNT: 12.1 % (ref 11.5–14.5)
FENTANYL SCREEN, URINE: NEGATIVE
GLUCOSE BLD-MCNC: 100 MG/DL (ref 70–99)
GLUCOSE SERPL-MCNC: 124 MG/DL (ref 70–99)
GLUCOSE UR STRIP.AUTO-MCNC: NEGATIVE MG/DL
HCT VFR BLD AUTO: 38.7 % (ref 37–47)
HGB BLD-MCNC: 13.1 G/DL (ref 12–16)
HGB UR STRIP.AUTO-MCNC: NEGATIVE MG/L
HYALINE CASTS #/AREA URNS AUTO: 5 /HPF (ref 0–8)
KETONES UR STRIP.AUTO-MCNC: NEGATIVE MG/DL
LEUKOCYTE ESTERASE UR QL STRIP.AUTO: ABNORMAL
MCH RBC QN AUTO: 29 PG (ref 27–31)
MCHC RBC AUTO-ENTMCNC: 33.9 G/DL (ref 33–37)
MCV RBC AUTO: 85.8 FL (ref 81–99)
METHADONE UR QL SCN: NEGATIVE
METHAMPHETAMINE, URINE: NEGATIVE
NITRITE UR QL STRIP.AUTO: NEGATIVE
OPIATES UR QL SCN: NEGATIVE
OXYCODONE UR QL SCN: NEGATIVE
PCP UR QL SCN: NEGATIVE
PERFORMED ON: ABNORMAL
PH UR STRIP.AUTO: 6.5 [PH] (ref 5–8)
PLATELET # BLD AUTO: 376 K/UL (ref 130–400)
PMV BLD AUTO: 9.4 FL (ref 9.4–12.3)
POTASSIUM SERPL-SCNC: 3.9 MMOL/L (ref 3.5–5.1)
PROT SERPL-MCNC: 6.5 G/DL (ref 6.4–8.3)
PROT UR STRIP.AUTO-MCNC: NEGATIVE MG/DL
PROT UR-MCNC: 16 MG/DL (ref 0–12)
RBC # BLD AUTO: 4.51 M/UL (ref 4.2–5.4)
RBC #/AREA URNS AUTO: 3 /HPF (ref 0–4)
SODIUM SERPL-SCNC: 137 MMOL/L (ref 136–145)
SP GR UR STRIP.AUTO: 1.02 (ref 1–1.03)
TRICYCLIC ANTIDEPRESSANTS, UR: NEGATIVE
URATE SERPL-MCNC: 4.8 MG/DL (ref 2.4–5.7)
UROBILINOGEN UR STRIP.AUTO-MCNC: 0.2 E.U./DL
WBC # BLD AUTO: 13.2 K/UL (ref 4.8–10.8)
WBC #/AREA URNS AUTO: 4 /HPF (ref 0–5)

## 2025-01-27 PROCEDURE — 1220000000 HC SEMI PRIVATE OB R&B

## 2025-01-27 PROCEDURE — 82962 GLUCOSE BLOOD TEST: CPT

## 2025-01-27 PROCEDURE — 2500000003 HC RX 250 WO HCPCS: Performed by: OBSTETRICS & GYNECOLOGY

## 2025-01-27 PROCEDURE — 86850 RBC ANTIBODY SCREEN: CPT

## 2025-01-27 PROCEDURE — G0480 DRUG TEST DEF 1-7 CLASSES: HCPCS

## 2025-01-27 PROCEDURE — 84156 ASSAY OF PROTEIN URINE: CPT

## 2025-01-27 PROCEDURE — 86901 BLOOD TYPING SEROLOGIC RH(D): CPT

## 2025-01-27 PROCEDURE — 82570 ASSAY OF URINE CREATININE: CPT

## 2025-01-27 PROCEDURE — 80307 DRUG TEST PRSMV CHEM ANLYZR: CPT

## 2025-01-27 PROCEDURE — 36415 COLL VENOUS BLD VENIPUNCTURE: CPT

## 2025-01-27 PROCEDURE — 6370000000 HC RX 637 (ALT 250 FOR IP): Performed by: OBSTETRICS & GYNECOLOGY

## 2025-01-27 PROCEDURE — 86900 BLOOD TYPING SEROLOGIC ABO: CPT

## 2025-01-27 PROCEDURE — 80053 COMPREHEN METABOLIC PANEL: CPT

## 2025-01-27 PROCEDURE — 6360000002 HC RX W HCPCS: Performed by: OBSTETRICS & GYNECOLOGY

## 2025-01-27 PROCEDURE — 81001 URINALYSIS AUTO W/SCOPE: CPT

## 2025-01-27 PROCEDURE — 86592 SYPHILIS TEST NON-TREP QUAL: CPT

## 2025-01-27 PROCEDURE — 85027 COMPLETE CBC AUTOMATED: CPT

## 2025-01-27 PROCEDURE — 84550 ASSAY OF BLOOD/URIC ACID: CPT

## 2025-01-27 RX ORDER — SODIUM CHLORIDE, SODIUM LACTATE, POTASSIUM CHLORIDE, AND CALCIUM CHLORIDE .6; .31; .03; .02 G/100ML; G/100ML; G/100ML; G/100ML
500 INJECTION, SOLUTION INTRAVENOUS PRN
Status: DISCONTINUED | OUTPATIENT
Start: 2025-01-27 | End: 2025-01-29 | Stop reason: ALTCHOICE

## 2025-01-27 RX ORDER — TRANEXAMIC ACID 10 MG/ML
1000 INJECTION, SOLUTION INTRAVENOUS
Status: ACTIVE | OUTPATIENT
Start: 2025-01-27 | End: 2025-01-28

## 2025-01-27 RX ORDER — ASPIRIN 81 MG/1
81 TABLET, CHEWABLE ORAL DAILY
Status: ON HOLD | COMMUNITY
End: 2025-01-31 | Stop reason: HOSPADM

## 2025-01-27 RX ORDER — INSULIN GLARGINE 100 [IU]/ML
40 INJECTION, SOLUTION SUBCUTANEOUS NIGHTLY
Status: DISCONTINUED | OUTPATIENT
Start: 2025-01-27 | End: 2025-01-31 | Stop reason: HOSPADM

## 2025-01-27 RX ORDER — CARBOPROST TROMETHAMINE 250 UG/ML
250 INJECTION, SOLUTION INTRAMUSCULAR PRN
Status: DISCONTINUED | OUTPATIENT
Start: 2025-01-27 | End: 2025-01-31 | Stop reason: HOSPADM

## 2025-01-27 RX ORDER — TERBUTALINE SULFATE 1 MG/ML
0.25 INJECTION, SOLUTION SUBCUTANEOUS
Status: ACTIVE | OUTPATIENT
Start: 2025-01-27 | End: 2025-01-28

## 2025-01-27 RX ORDER — MISOPROSTOL 200 UG/1
400 TABLET ORAL PRN
Status: DISCONTINUED | OUTPATIENT
Start: 2025-01-27 | End: 2025-01-28 | Stop reason: SDUPTHER

## 2025-01-27 RX ORDER — SODIUM CHLORIDE 0.9 % (FLUSH) 0.9 %
5-40 SYRINGE (ML) INJECTION PRN
Status: DISCONTINUED | OUTPATIENT
Start: 2025-01-27 | End: 2025-01-29 | Stop reason: SDUPTHER

## 2025-01-27 RX ORDER — METHYLERGONOVINE MALEATE 0.2 MG/ML
200 INJECTION INTRAVENOUS PRN
Status: DISCONTINUED | OUTPATIENT
Start: 2025-01-27 | End: 2025-01-31 | Stop reason: HOSPADM

## 2025-01-27 RX ORDER — SODIUM CHLORIDE 0.9 % (FLUSH) 0.9 %
5-40 SYRINGE (ML) INJECTION EVERY 12 HOURS SCHEDULED
Status: DISCONTINUED | OUTPATIENT
Start: 2025-01-27 | End: 2025-01-29 | Stop reason: SDUPTHER

## 2025-01-27 RX ORDER — NIFEDIPINE 30 MG/1
30 TABLET, EXTENDED RELEASE ORAL DAILY
Status: DISCONTINUED | OUTPATIENT
Start: 2025-01-28 | End: 2025-01-31 | Stop reason: HOSPADM

## 2025-01-27 RX ORDER — ONDANSETRON 4 MG/1
4 TABLET, ORALLY DISINTEGRATING ORAL EVERY 6 HOURS PRN
Status: DISCONTINUED | OUTPATIENT
Start: 2025-01-27 | End: 2025-01-28 | Stop reason: SDUPTHER

## 2025-01-27 RX ORDER — ASPIRIN 81 MG/1
81 TABLET, CHEWABLE ORAL DAILY
Status: DISCONTINUED | OUTPATIENT
Start: 2025-01-28 | End: 2025-01-31 | Stop reason: HOSPADM

## 2025-01-27 RX ORDER — ONDANSETRON 2 MG/ML
4 INJECTION INTRAMUSCULAR; INTRAVENOUS EVERY 6 HOURS PRN
Status: DISCONTINUED | OUTPATIENT
Start: 2025-01-27 | End: 2025-01-28 | Stop reason: SDUPTHER

## 2025-01-27 RX ORDER — FENTANYL CITRATE 50 UG/ML
25 INJECTION, SOLUTION INTRAMUSCULAR; INTRAVENOUS
Status: DISCONTINUED | OUTPATIENT
Start: 2025-01-27 | End: 2025-01-29 | Stop reason: ALTCHOICE

## 2025-01-27 RX ORDER — DOCUSATE SODIUM 100 MG/1
100 CAPSULE, LIQUID FILLED ORAL 2 TIMES DAILY
Status: DISCONTINUED | OUTPATIENT
Start: 2025-01-27 | End: 2025-01-28 | Stop reason: SDUPTHER

## 2025-01-27 RX ORDER — SODIUM CHLORIDE 9 MG/ML
25 INJECTION, SOLUTION INTRAVENOUS PRN
Status: DISCONTINUED | OUTPATIENT
Start: 2025-01-27 | End: 2025-01-29 | Stop reason: ALTCHOICE

## 2025-01-27 RX ORDER — SODIUM CHLORIDE, SODIUM LACTATE, POTASSIUM CHLORIDE, CALCIUM CHLORIDE 600; 310; 30; 20 MG/100ML; MG/100ML; MG/100ML; MG/100ML
INJECTION, SOLUTION INTRAVENOUS CONTINUOUS
Status: DISCONTINUED | OUTPATIENT
Start: 2025-01-27 | End: 2025-01-31 | Stop reason: HOSPADM

## 2025-01-27 RX ADMIN — INSULIN GLARGINE 40 UNITS: 100 INJECTION, SOLUTION SUBCUTANEOUS at 23:55

## 2025-01-27 RX ADMIN — Medication 1 MILLI-UNITS/MIN: at 14:18

## 2025-01-27 RX ADMIN — WATER 1000 MG: 1 INJECTION INTRAMUSCULAR; INTRAVENOUS; SUBCUTANEOUS at 21:39

## 2025-01-27 RX ADMIN — WATER 2000 MG: 1 INJECTION INTRAMUSCULAR; INTRAVENOUS; SUBCUTANEOUS at 13:59

## 2025-01-27 SDOH — ECONOMIC STABILITY: INCOME INSECURITY: HOW HARD IS IT FOR YOU TO PAY FOR THE VERY BASICS LIKE FOOD, HOUSING, MEDICAL CARE, AND HEATING?: NOT HARD AT ALL

## 2025-01-27 SDOH — ECONOMIC STABILITY: INCOME INSECURITY: IN THE PAST 12 MONTHS, HAS THE ELECTRIC, GAS, OIL, OR WATER COMPANY THREATENED TO SHUT OFF SERVICE IN YOUR HOME?: NO

## 2025-01-27 SDOH — ECONOMIC STABILITY: FOOD INSECURITY: WITHIN THE PAST 12 MONTHS, YOU WORRIED THAT YOUR FOOD WOULD RUN OUT BEFORE YOU GOT MONEY TO BUY MORE.: NEVER TRUE

## 2025-01-27 ASSESSMENT — PATIENT HEALTH QUESTIONNAIRE - PHQ9
SUM OF ALL RESPONSES TO PHQ QUESTIONS 1-9: 0
SUM OF ALL RESPONSES TO PHQ QUESTIONS 1-9: 0
2. FEELING DOWN, DEPRESSED OR HOPELESS: NOT AT ALL
SUM OF ALL RESPONSES TO PHQ QUESTIONS 1-9: 0
SUM OF ALL RESPONSES TO PHQ9 QUESTIONS 1 & 2: 0
SUM OF ALL RESPONSES TO PHQ QUESTIONS 1-9: 0
1. LITTLE INTEREST OR PLEASURE IN DOING THINGS: NOT AT ALL

## 2025-01-27 NOTE — DISCHARGE INSTRUCTIONS

## 2025-01-27 NOTE — PROGRESS NOTES
Pt sent over from the [facility] for induction per . New orders given. Pt denies any vaginal bleeding or lof. Efm and toco applied to soft, non-tender abdomen. Pt confirms +FM. Continuing to monitor

## 2025-01-27 NOTE — CARE COORDINATION
Cecilia provided Pt with written documentation for community resources based on Pt SDOH scores and any additional resources needed. These resources will be provided to the Pt at D/C. Electronically signed by Cecilia Guzman on 1/27/2025 at 3:57 PM

## 2025-01-28 ENCOUNTER — ANESTHESIA EVENT (OUTPATIENT)
Dept: LABOR AND DELIVERY | Age: 35
End: 2025-01-28
Payer: COMMERCIAL

## 2025-01-28 ENCOUNTER — ANESTHESIA (OUTPATIENT)
Dept: LABOR AND DELIVERY | Age: 35
End: 2025-01-28
Payer: COMMERCIAL

## 2025-01-28 PROBLEM — O99.210 OBESITY IN PREGNANCY: Status: ACTIVE | Noted: 2025-01-28

## 2025-01-28 PROBLEM — O24.419 GESTATIONAL DIABETES MELLITUS (GDM), ANTEPARTUM: Status: ACTIVE | Noted: 2025-01-28

## 2025-01-28 PROBLEM — Z3A.34 34 WEEKS GESTATION OF PREGNANCY: Status: RESOLVED | Noted: 2025-01-09 | Resolved: 2025-01-28

## 2025-01-28 PROBLEM — Z3A.32 32 WEEKS GESTATION OF PREGNANCY: Status: RESOLVED | Noted: 2024-12-30 | Resolved: 2025-01-28

## 2025-01-28 LAB
ANION GAP BLD CALC-SCNC: 8 MMOL/L
ANION GAP SERPL CALC-SCNC: 102 MEQ/L (ref 99–110)
BASE EXCESS ARTERIAL: 1 (ref -3–3)
CA-I BLD-SCNC: 1.62 MMOL/L (ref 1.1–1.3)
CO2 BLD CALC-SCNC: 31 MEQ/L (ref 21–32)
CREAT SERPL-MCNC: 0.4 MG/DL (ref 0.3–1.3)
GLUCOSE BLD-MCNC: 53 MG/DL (ref 70–99)
HCT VFR BLD AUTO: 51 % (ref 37–52)
HGB BLD CALC-MCNC: 17.4 GM/DL (ref 12–18)
O2 SAT, ARTERIAL: 9 % (ref 93–100)
PCO2 ARTERIAL: 74 MM HG (ref 35–48)
PERFORMED ON: ABNORMAL
PH ARTERIAL: 7.24 (ref 7.3–7.5)
PO2 ARTERIAL: 11 MM HG (ref 83–108)
POTASSIUM SERPL-SCNC: 5.3 MEQ/L (ref 3.5–5.1)
SODIUM BLD-SCNC: 141 MEQ/L (ref 136–145)
TCO2 ARTERIAL: 34 MMOL/L

## 2025-01-28 PROCEDURE — C1765 ADHESION BARRIER: HCPCS | Performed by: OBSTETRICS & GYNECOLOGY

## 2025-01-28 PROCEDURE — 6370000000 HC RX 637 (ALT 250 FOR IP): Performed by: OBSTETRICS & GYNECOLOGY

## 2025-01-28 PROCEDURE — 59514 CESAREAN DELIVERY ONLY: CPT | Performed by: OBSTETRICS & GYNECOLOGY

## 2025-01-28 PROCEDURE — 82947 ASSAY GLUCOSE BLOOD QUANT: CPT

## 2025-01-28 PROCEDURE — 2580000003 HC RX 258: Performed by: NURSE ANESTHETIST, CERTIFIED REGISTERED

## 2025-01-28 PROCEDURE — 3609079900 HC CESAREAN SECTION: Performed by: OBSTETRICS & GYNECOLOGY

## 2025-01-28 PROCEDURE — 7100000001 HC PACU RECOVERY - ADDTL 15 MIN: Performed by: OBSTETRICS & GYNECOLOGY

## 2025-01-28 PROCEDURE — 82330 ASSAY OF CALCIUM: CPT

## 2025-01-28 PROCEDURE — 6360000002 HC RX W HCPCS: Performed by: OBSTETRICS & GYNECOLOGY

## 2025-01-28 PROCEDURE — 3700000025 EPIDURAL BLOCK: Performed by: NURSE ANESTHETIST, CERTIFIED REGISTERED

## 2025-01-28 PROCEDURE — 82800 BLOOD PH: CPT

## 2025-01-28 PROCEDURE — 3700000001 HC ADD 15 MINUTES (ANESTHESIA): Performed by: OBSTETRICS & GYNECOLOGY

## 2025-01-28 PROCEDURE — 82810 BLOOD GASES O2 SAT ONLY: CPT

## 2025-01-28 PROCEDURE — 84132 ASSAY OF SERUM POTASSIUM: CPT

## 2025-01-28 PROCEDURE — 6360000002 HC RX W HCPCS: Performed by: NURSE ANESTHETIST, CERTIFIED REGISTERED

## 2025-01-28 PROCEDURE — 2500000003 HC RX 250 WO HCPCS: Performed by: OBSTETRICS & GYNECOLOGY

## 2025-01-28 PROCEDURE — 84295 ASSAY OF SERUM SODIUM: CPT

## 2025-01-28 PROCEDURE — 2580000003 HC RX 258: Performed by: OBSTETRICS & GYNECOLOGY

## 2025-01-28 PROCEDURE — 1220000000 HC SEMI PRIVATE OB R&B

## 2025-01-28 PROCEDURE — 82435 ASSAY OF BLOOD CHLORIDE: CPT

## 2025-01-28 PROCEDURE — 3700000000 HC ANESTHESIA ATTENDED CARE: Performed by: OBSTETRICS & GYNECOLOGY

## 2025-01-28 PROCEDURE — 7100000000 HC PACU RECOVERY - FIRST 15 MIN: Performed by: OBSTETRICS & GYNECOLOGY

## 2025-01-28 PROCEDURE — 88307 TISSUE EXAM BY PATHOLOGIST: CPT

## 2025-01-28 PROCEDURE — 2709999900 HC NON-CHARGEABLE SUPPLY: Performed by: OBSTETRICS & GYNECOLOGY

## 2025-01-28 PROCEDURE — 2500000003 HC RX 250 WO HCPCS: Performed by: NURSE ANESTHETIST, CERTIFIED REGISTERED

## 2025-01-28 PROCEDURE — 82374 ASSAY BLOOD CARBON DIOXIDE: CPT

## 2025-01-28 PROCEDURE — 82565 ASSAY OF CREATININE: CPT

## 2025-01-28 PROCEDURE — 85014 HEMATOCRIT: CPT

## 2025-01-28 PROCEDURE — 2720000010 HC SURG SUPPLY STERILE: Performed by: OBSTETRICS & GYNECOLOGY

## 2025-01-28 RX ORDER — NALOXONE HYDROCHLORIDE 0.4 MG/ML
INJECTION, SOLUTION INTRAMUSCULAR; INTRAVENOUS; SUBCUTANEOUS PRN
Status: DISCONTINUED | OUTPATIENT
Start: 2025-01-28 | End: 2025-01-28 | Stop reason: HOSPADM

## 2025-01-28 RX ORDER — ROPIVACAINE HYDROCHLORIDE 2 MG/ML
INJECTION, SOLUTION EPIDURAL; INFILTRATION; PERINEURAL
Status: DISCONTINUED | OUTPATIENT
Start: 2025-01-28 | End: 2025-01-28 | Stop reason: SDUPTHER

## 2025-01-28 RX ORDER — OXYCODONE HYDROCHLORIDE 5 MG/1
5 TABLET ORAL EVERY 4 HOURS PRN
Status: DISCONTINUED | OUTPATIENT
Start: 2025-01-28 | End: 2025-01-31 | Stop reason: HOSPADM

## 2025-01-28 RX ORDER — ONDANSETRON 2 MG/ML
4 INJECTION INTRAMUSCULAR; INTRAVENOUS EVERY 6 HOURS PRN
Status: DISCONTINUED | OUTPATIENT
Start: 2025-01-28 | End: 2025-01-31 | Stop reason: HOSPADM

## 2025-01-28 RX ORDER — LIDOCAINE HYDROCHLORIDE AND EPINEPHRINE 15; 5 MG/ML; UG/ML
INJECTION, SOLUTION EPIDURAL
Status: DISCONTINUED | OUTPATIENT
Start: 2025-01-28 | End: 2025-01-28 | Stop reason: SDUPTHER

## 2025-01-28 RX ORDER — ACETAMINOPHEN 500 MG
1000 TABLET ORAL EVERY 8 HOURS
Status: DISCONTINUED | OUTPATIENT
Start: 2025-01-28 | End: 2025-01-31 | Stop reason: HOSPADM

## 2025-01-28 RX ORDER — CITRIC ACID/SODIUM CITRATE 334-500MG
30 SOLUTION, ORAL ORAL ONCE
Status: COMPLETED | OUTPATIENT
Start: 2025-01-28 | End: 2025-01-28

## 2025-01-28 RX ORDER — ROPIVACAINE HYDROCHLORIDE 2 MG/ML
10 INJECTION, SOLUTION EPIDURAL; INFILTRATION; PERINEURAL CONTINUOUS
Status: DISCONTINUED | OUTPATIENT
Start: 2025-01-28 | End: 2025-01-28 | Stop reason: HOSPADM

## 2025-01-28 RX ORDER — CEFAZOLIN SODIUM 1 G/3ML
INJECTION, POWDER, FOR SOLUTION INTRAMUSCULAR; INTRAVENOUS
Status: DISCONTINUED | OUTPATIENT
Start: 2025-01-28 | End: 2025-01-28

## 2025-01-28 RX ORDER — TRANEXAMIC ACID 100 MG/ML
INJECTION, SOLUTION INTRAVENOUS
Status: DISCONTINUED | OUTPATIENT
Start: 2025-01-28 | End: 2025-01-28 | Stop reason: SDUPTHER

## 2025-01-28 RX ORDER — KETOROLAC TROMETHAMINE 30 MG/ML
INJECTION, SOLUTION INTRAMUSCULAR; INTRAVENOUS
Status: DISCONTINUED | OUTPATIENT
Start: 2025-01-28 | End: 2025-01-28 | Stop reason: SDUPTHER

## 2025-01-28 RX ORDER — FERROUS SULFATE 325(65) MG
325 TABLET ORAL 2 TIMES DAILY WITH MEALS
Status: DISCONTINUED | OUTPATIENT
Start: 2025-01-28 | End: 2025-01-31 | Stop reason: HOSPADM

## 2025-01-28 RX ORDER — EPHEDRINE SULFATE/0.9% NACL/PF 25 MG/5 ML
5 SYRINGE (ML) INTRAVENOUS PRN
Status: DISCONTINUED | OUTPATIENT
Start: 2025-01-29 | End: 2025-01-28 | Stop reason: HOSPADM

## 2025-01-28 RX ORDER — SODIUM CHLORIDE 0.9 % (FLUSH) 0.9 %
5-40 SYRINGE (ML) INJECTION EVERY 12 HOURS SCHEDULED
Status: DISCONTINUED | OUTPATIENT
Start: 2025-01-28 | End: 2025-01-30

## 2025-01-28 RX ORDER — ONDANSETRON 2 MG/ML
4 INJECTION INTRAMUSCULAR; INTRAVENOUS EVERY 6 HOURS PRN
Status: DISCONTINUED | OUTPATIENT
Start: 2025-01-28 | End: 2025-01-28 | Stop reason: SDUPTHER

## 2025-01-28 RX ORDER — BUPIVACAINE HYDROCHLORIDE 7.5 MG/ML
INJECTION, SOLUTION INTRASPINAL
Status: DISCONTINUED | OUTPATIENT
Start: 2025-01-28 | End: 2025-01-28 | Stop reason: SDUPTHER

## 2025-01-28 RX ORDER — SIMETHICONE 80 MG
80 TABLET,CHEWABLE ORAL EVERY 6 HOURS PRN
Status: DISCONTINUED | OUTPATIENT
Start: 2025-01-28 | End: 2025-01-31 | Stop reason: HOSPADM

## 2025-01-28 RX ORDER — SODIUM CHLORIDE, SODIUM LACTATE, POTASSIUM CHLORIDE, CALCIUM CHLORIDE 600; 310; 30; 20 MG/100ML; MG/100ML; MG/100ML; MG/100ML
INJECTION, SOLUTION INTRAVENOUS
Status: DISCONTINUED | OUTPATIENT
Start: 2025-01-28 | End: 2025-01-28 | Stop reason: SDUPTHER

## 2025-01-28 RX ORDER — SODIUM CHLORIDE 9 MG/ML
INJECTION, SOLUTION INTRAVENOUS PRN
Status: DISCONTINUED | OUTPATIENT
Start: 2025-01-28 | End: 2025-01-31 | Stop reason: HOSPADM

## 2025-01-28 RX ORDER — ONDANSETRON 4 MG/1
4 TABLET, ORALLY DISINTEGRATING ORAL EVERY 8 HOURS PRN
Status: DISCONTINUED | OUTPATIENT
Start: 2025-01-28 | End: 2025-01-31 | Stop reason: HOSPADM

## 2025-01-28 RX ORDER — MISOPROSTOL 200 UG/1
400 TABLET ORAL PRN
Status: DISCONTINUED | OUTPATIENT
Start: 2025-01-28 | End: 2025-01-31 | Stop reason: HOSPADM

## 2025-01-28 RX ORDER — CEFAZOLIN SODIUM 1 G/3ML
INJECTION, POWDER, FOR SOLUTION INTRAMUSCULAR; INTRAVENOUS
Status: DISCONTINUED | OUTPATIENT
Start: 2025-01-28 | End: 2025-01-28 | Stop reason: SDUPTHER

## 2025-01-28 RX ORDER — FENTANYL CITRATE 50 UG/ML
INJECTION, SOLUTION INTRAMUSCULAR; INTRAVENOUS
Status: DISCONTINUED | OUTPATIENT
Start: 2025-01-28 | End: 2025-01-28 | Stop reason: SDUPTHER

## 2025-01-28 RX ORDER — OXYCODONE HYDROCHLORIDE 10 MG/1
10 TABLET ORAL EVERY 4 HOURS PRN
Status: DISCONTINUED | OUTPATIENT
Start: 2025-01-28 | End: 2025-01-31 | Stop reason: HOSPADM

## 2025-01-28 RX ORDER — DIPHENHYDRAMINE HYDROCHLORIDE 50 MG/ML
25 INJECTION INTRAMUSCULAR; INTRAVENOUS EVERY 6 HOURS PRN
Status: DISCONTINUED | OUTPATIENT
Start: 2025-01-28 | End: 2025-01-31 | Stop reason: HOSPADM

## 2025-01-28 RX ORDER — IBUPROFEN 400 MG/1
800 TABLET, FILM COATED ORAL EVERY 8 HOURS
Status: DISCONTINUED | OUTPATIENT
Start: 2025-01-30 | End: 2025-01-31 | Stop reason: HOSPADM

## 2025-01-28 RX ORDER — POLYETHYLENE GLYCOL 3350 17 G/17G
17 POWDER, FOR SOLUTION ORAL DAILY
Status: DISCONTINUED | OUTPATIENT
Start: 2025-01-28 | End: 2025-01-31 | Stop reason: HOSPADM

## 2025-01-28 RX ORDER — IBUPROFEN 400 MG/1
800 TABLET, FILM COATED ORAL EVERY 8 HOURS
Status: DISCONTINUED | OUTPATIENT
Start: 2025-01-29 | End: 2025-01-28

## 2025-01-28 RX ORDER — KETOROLAC TROMETHAMINE 30 MG/ML
30 INJECTION, SOLUTION INTRAMUSCULAR; INTRAVENOUS EVERY 6 HOURS
Status: DISCONTINUED | OUTPATIENT
Start: 2025-01-28 | End: 2025-01-28

## 2025-01-28 RX ORDER — SODIUM CHLORIDE 0.9 % (FLUSH) 0.9 %
5-40 SYRINGE (ML) INJECTION PRN
Status: DISCONTINUED | OUTPATIENT
Start: 2025-01-28 | End: 2025-01-30

## 2025-01-28 RX ORDER — HYDROMORPHONE HYDROCHLORIDE 1 MG/ML
0.5 INJECTION, SOLUTION INTRAMUSCULAR; INTRAVENOUS; SUBCUTANEOUS
Status: DISCONTINUED | OUTPATIENT
Start: 2025-01-28 | End: 2025-01-31 | Stop reason: HOSPADM

## 2025-01-28 RX ORDER — PRENATAL VIT/IRON FUM/FOLIC AC 27MG-0.8MG
1 TABLET ORAL DAILY
Status: DISCONTINUED | OUTPATIENT
Start: 2025-01-28 | End: 2025-01-31 | Stop reason: HOSPADM

## 2025-01-28 RX ORDER — ACETAMINOPHEN 325 MG/1
650 TABLET ORAL EVERY 4 HOURS PRN
Status: DISCONTINUED | OUTPATIENT
Start: 2025-01-28 | End: 2025-01-31 | Stop reason: HOSPADM

## 2025-01-28 RX ORDER — KETOROLAC TROMETHAMINE 30 MG/ML
30 INJECTION, SOLUTION INTRAMUSCULAR; INTRAVENOUS EVERY 6 HOURS
Status: COMPLETED | OUTPATIENT
Start: 2025-01-29 | End: 2025-01-29

## 2025-01-28 RX ORDER — DEXAMETHASONE SODIUM PHOSPHATE 4 MG/ML
INJECTION, SOLUTION INTRA-ARTICULAR; INTRALESIONAL; INTRAMUSCULAR; INTRAVENOUS; SOFT TISSUE
Status: DISCONTINUED | OUTPATIENT
Start: 2025-01-28 | End: 2025-01-28 | Stop reason: SDUPTHER

## 2025-01-28 RX ORDER — DOCUSATE SODIUM 100 MG/1
100 CAPSULE, LIQUID FILLED ORAL 2 TIMES DAILY
Status: DISCONTINUED | OUTPATIENT
Start: 2025-01-28 | End: 2025-01-31 | Stop reason: HOSPADM

## 2025-01-28 RX ORDER — EPHEDRINE SULFATE/0.9% NACL/PF 25 MG/5 ML
10 SYRINGE (ML) INTRAVENOUS
Status: DISCONTINUED | OUTPATIENT
Start: 2025-01-28 | End: 2025-01-28 | Stop reason: HOSPADM

## 2025-01-28 RX ORDER — HYDROMORPHONE HYDROCHLORIDE 1 MG/ML
1 INJECTION, SOLUTION INTRAMUSCULAR; INTRAVENOUS; SUBCUTANEOUS
Status: DISCONTINUED | OUTPATIENT
Start: 2025-01-28 | End: 2025-01-31 | Stop reason: HOSPADM

## 2025-01-28 RX ADMIN — CEFAZOLIN 3 G: 1 INJECTION, POWDER, FOR SOLUTION INTRAMUSCULAR; INTRAVENOUS at 16:30

## 2025-01-28 RX ADMIN — PHENYLEPHRINE HYDROCHLORIDE 100 MCG: 10 INJECTION INTRAVENOUS at 16:54

## 2025-01-28 RX ADMIN — Medication 30 ML: at 16:20

## 2025-01-28 RX ADMIN — ACETAMINOPHEN 650 MG: 325 TABLET ORAL at 08:05

## 2025-01-28 RX ADMIN — INSULIN GLARGINE 40 UNITS: 100 INJECTION, SOLUTION SUBCUTANEOUS at 23:40

## 2025-01-28 RX ADMIN — LIDOCAINE HYDROCHLORIDE AND EPINEPHRINE 3 ML: 15; 5 INJECTION, SOLUTION EPIDURAL at 09:57

## 2025-01-28 RX ADMIN — ROPIVACAINE HYDROCHLORIDE 12 ML/HR: 2 INJECTION, SOLUTION EPIDURAL; INFILTRATION at 10:06

## 2025-01-28 RX ADMIN — LIDOCAINE HYDROCHLORIDE AND EPINEPHRINE 2 ML: 15; 5 INJECTION, SOLUTION EPIDURAL at 09:58

## 2025-01-28 RX ADMIN — WATER 1000 MG: 1 INJECTION INTRAMUSCULAR; INTRAVENOUS; SUBCUTANEOUS at 05:38

## 2025-01-28 RX ADMIN — SODIUM CHLORIDE, POTASSIUM CHLORIDE, SODIUM LACTATE AND CALCIUM CHLORIDE: 600; 310; 30; 20 INJECTION, SOLUTION INTRAVENOUS at 09:13

## 2025-01-28 RX ADMIN — FENTANYL CITRATE 250 MCG: 50 INJECTION INTRAMUSCULAR; INTRAVENOUS at 09:58

## 2025-01-28 RX ADMIN — BUPIVACAINE HYDROCHLORIDE IN DEXTROSE 1.4 ML: 7.5 INJECTION, SOLUTION SUBARACHNOID at 16:25

## 2025-01-28 RX ADMIN — PHENYLEPHRINE HYDROCHLORIDE 100 MCG: 10 INJECTION INTRAVENOUS at 17:05

## 2025-01-28 RX ADMIN — ASPIRIN 81 MG: 81 TABLET, CHEWABLE ORAL at 08:05

## 2025-01-28 RX ADMIN — DOCUSATE SODIUM 100 MG: 100 CAPSULE, LIQUID FILLED ORAL at 21:32

## 2025-01-28 RX ADMIN — OXYCODONE HYDROCHLORIDE 10 MG: 10 TABLET ORAL at 23:45

## 2025-01-28 RX ADMIN — DEXAMETHASONE SODIUM PHOSPHATE 4 MG: 4 INJECTION, SOLUTION INTRAMUSCULAR; INTRAVENOUS at 16:56

## 2025-01-28 RX ADMIN — WATER 1000 MG: 1 INJECTION INTRAMUSCULAR; INTRAVENOUS; SUBCUTANEOUS at 13:22

## 2025-01-28 RX ADMIN — Medication 10 MILLI-UNITS/MIN: at 08:29

## 2025-01-28 RX ADMIN — OXYCODONE HYDROCHLORIDE 10 MG: 10 TABLET ORAL at 18:48

## 2025-01-28 RX ADMIN — WATER 1000 MG: 1 INJECTION INTRAMUSCULAR; INTRAVENOUS; SUBCUTANEOUS at 21:32

## 2025-01-28 RX ADMIN — Medication 4 MILLI-UNITS/MIN: at 06:20

## 2025-01-28 RX ADMIN — SODIUM CHLORIDE, PRESERVATIVE FREE 10 ML: 5 INJECTION INTRAVENOUS at 21:32

## 2025-01-28 RX ADMIN — ONDANSETRON 4 MG: 2 INJECTION INTRAMUSCULAR; INTRAVENOUS at 16:15

## 2025-01-28 RX ADMIN — Medication 700 ML/HR: at 16:48

## 2025-01-28 RX ADMIN — TRANEXAMIC ACID 1000 MG: 1 INJECTION, SOLUTION INTRAVENOUS at 16:51

## 2025-01-28 RX ADMIN — DOCUSATE SODIUM 100 MG: 100 CAPSULE, LIQUID FILLED ORAL at 08:04

## 2025-01-28 RX ADMIN — KETOROLAC TROMETHAMINE 30 MG: 30 INJECTION, SOLUTION INTRAMUSCULAR; INTRAVENOUS at 17:22

## 2025-01-28 RX ADMIN — METFORMIN HYDROCHLORIDE 500 MG: 500 TABLET ORAL at 08:05

## 2025-01-28 RX ADMIN — Medication 1 MILLI-UNITS/MIN: at 05:15

## 2025-01-28 RX ADMIN — Medication 2 MILLI-UNITS/MIN: at 05:45

## 2025-01-28 RX ADMIN — NIFEDIPINE 30 MG: 30 TABLET, EXTENDED RELEASE ORAL at 08:05

## 2025-01-28 RX ADMIN — SODIUM CHLORIDE, SODIUM LACTATE, POTASSIUM CHLORIDE, AND CALCIUM CHLORIDE: 600; 310; 30; 20 INJECTION, SOLUTION INTRAVENOUS at 16:14

## 2025-01-28 RX ADMIN — FAMOTIDINE 20 MG: 10 INJECTION, SOLUTION INTRAVENOUS at 16:20

## 2025-01-28 ASSESSMENT — PAIN DESCRIPTION - DESCRIPTORS
DESCRIPTORS: BURNING
DESCRIPTORS: CRAMPING;ACHING

## 2025-01-28 ASSESSMENT — LIFESTYLE VARIABLES: SMOKING_STATUS: 1

## 2025-01-28 ASSESSMENT — PAIN DESCRIPTION - LOCATION
LOCATION: ABDOMEN
LOCATION: ABDOMEN;INCISION

## 2025-01-28 ASSESSMENT — PAIN SCALES - GENERAL
PAINLEVEL_OUTOF10: 7
PAINLEVEL_OUTOF10: 3

## 2025-01-28 ASSESSMENT — PAIN - FUNCTIONAL ASSESSMENT: PAIN_FUNCTIONAL_ASSESSMENT: PREVENTS OR INTERFERES SOME ACTIVE ACTIVITIES AND ADLS

## 2025-01-28 NOTE — FLOWSHEET NOTE
Dr. Wilde notified of pt's refusal to move, desire for , and lack of cervical change. Orders to prep patient for CS.

## 2025-01-28 NOTE — ANESTHESIA POSTPROCEDURE EVALUATION
Department of Anesthesiology  Postprocedure Note    Patient: Magy Samuel  MRN: [id number]  YOB: 1990  Date of evaluation: 2025    Procedure Summary       Date: 25 Room / Location: [facility]    Anesthesia Start: 935 Anesthesia Stop:     Procedures:        SECTION (Abdomen)      Labor Analgesia Diagnosis: (Arrest of Descent)    Surgeons: Tyra Conn MD Responsible Provider: Theodore Toussaint APRN - CRNA    Anesthesia Type: epidural ASA Status: 3            Anesthesia Type: No value filed.    Amado Phase I:      Amado Phase II:      Anesthesia Post Evaluation    Patient location during evaluation: PACU  Patient participation: complete - patient participated  Level of consciousness: sleepy but conscious and awake  Pain score: 0  Airway patency: patent  Nausea & Vomiting: no nausea and no vomiting  Cardiovascular status: hemodynamically stable and blood pressure returned to baseline  Respiratory status: acceptable  Hydration status: euvolemic  Comments: T8 bilaterally to pinprick.  VSS.  Multimodal analgesia pain management approach  Pain management: adequate    No notable events documented.

## 2025-01-28 NOTE — FLOWSHEET NOTE
This nurse to bedside to move patient from left to right side. Pt refusing to move at this time. Pt reports that she is \"tired of this\" and \"just wants it to be over.\" This nurse educated patient on benefits of movement during labor to progress labor. Pt still refusing to move. This nurse educated pt that she can call out when she wants help changing positions.

## 2025-01-28 NOTE — ANESTHESIA PROCEDURE NOTES
Epidural Block    Patient location during procedure: OB  Start time: 1/28/2025 9:49 AM  End time: 1/28/2025 9:54 AM  Reason for block: labor epidural  Staffing  Performed: resident/CRNA   Resident/CRNA: Theodore Toussaint APRN - CRNA  Performed by: Theodore Toussaint APRN - CRNA  Authorized by: Theodore Toussaint APRN - CRNA    Epidural  Patient position: sitting  Prep: ChloraPrep  Patient monitoring: continuous pulse ox and frequent blood pressure checks  Approach: midline  Location: L4-5  Injection technique: GAMAL saline  Provider prep: mask and sterile gloves  Needle  Needle type: Tuohy   Needle gauge: 17 G  Needle length: 6 in  Needle insertion depth: 9.5 cm  Catheter type: multi-orifice  Catheter size: 19 G  Catheter at skin depth: 15 cm  Test dose: negativeCatheter Secured: tegaderm and tape  Assessment  Sensory level: T10 (bilaterally to pinprick assessment)  Hemodynamics: stable  Attempts: 1  Outcomes: uncomplicated and patient tolerated procedure well  Additional Notes  Pt. reported relief from contraction pain.  VSS.  FHTS. PCEA instructions provided - pt. verbalized understanding.  PCEA control secured to bedrail within easy reach. No questions at this time.  Preanesthetic Checklist  Completed: patient identified, IV checked, site marked, risks and benefits discussed, surgical/procedural consents, equipment checked, pre-op evaluation, timeout performed, anesthesia consent given, oxygen available and monitors applied/VS acknowledged

## 2025-01-28 NOTE — ANESTHESIA PRE PROCEDURE
Department of Anesthesiology  Preprocedure Note       Name:  Magy Samuel   Age:  34 y.o.  :  1990                                          MRN:  [id number]         Date:  2025      Surgeon: * No surgeons listed *    Procedure: * No procedures listed *    Medications prior to admission:   Prior to Admission medications    Medication Sig Start Date End Date Taking? Authorizing Provider   aspirin 81 MG chewable tablet Take 1 tablet by mouth daily   Yes Joellen Spence MD   metFORMIN (GLUCOPHAGE) 500 MG tablet Take 2 tablets by mouth 2 times daily (with meals) 24  Yes Joellen Spence MD   LANNGHIA SOLOSTAR 100 UNIT/ML injection pen Inject 40 Units into the skin nightly 24  Yes Joellen Spence MD   NIFEdipine (PROCARDIA XL) 30 MG extended release tablet Take 1 tablet by mouth daily Managed @ [facility]   Yes Joellen Spence MD   pantoprazole (PROTONIX) 40 MG tablet Take 1 tablet by mouth 18  Yes Joellen Spence MD   albuterol sulfate HFA (VENTOLIN HFA) 108 (90 Base) MCG/ACT inhaler INHALE 2 PUFFS BY MOUTH EVERY 4 HOURS AS NEEDED FOR WHEEZING  Patient not taking: Reported on 2025   Joellen Spence MD   fluticasone (FLONASE) 50 MCG/ACT nasal spray 2 sprays by Nasal route  Patient not taking: Reported on 2025   Joellen Spence MD   levothyroxine (SYNTHROID) 200 MCG tablet Take 200 mcg by mouth  Patient not taking: Reported on 2025   Joellen Spence MD   ofloxacin (FLOXIN) 0.3 % otic solution Place in ear(s)  Patient not taking: Reported on 2025 10/1/18   Joellen Spence MD   SUMAtriptan (IMITREX) 25 MG tablet Take one tablet at onset of headache. May repeat dose one time in 2 hours if headache not relieved.  Patient not taking: Reported on 2025   Joellen Spence MD   zolpidem (AMBIEN) 5 MG tablet Take 5 mg by mouth..  Patient not taking: Reported on 2025   Bebe 
01/27/2025    LABANTI NEG 01/27/2025       Drug/Infectious Status (If Applicable):  No results found for: \"HIV\", \"HEPCAB\"    COVID-19 Screening (If Applicable): No results found for: \"COVID19\"        Anesthesia Evaluation     no history of anesthetic complications:   Airway:           Dental:          Pulmonary:   (+)           current smoker (last cigarette 1/27/25)          Patient did not smoke on day of surgery.                 Cardiovascular:  Exercise tolerance: good (>4 METS)  (+) hypertension: moderate                  Neuro/Psych:               GI/Hepatic/Renal:   (+) morbid obesity          Endo/Other:    (+) DiabetesType II DM, well controlled, using insulin, hypothyroidism::..                 Abdominal:             Vascular: negative vascular ROS.         Other Findings:       Anesthesia Plan        Theodore Toussaint, PETE - CRNA   1/28/2025

## 2025-01-29 LAB
ERYTHROCYTE [DISTWIDTH] IN BLOOD BY AUTOMATED COUNT: 12.4 % (ref 11.5–14.5)
GLUCOSE BLD-MCNC: 99 MG/DL (ref 70–99)
HCT VFR BLD AUTO: 30.7 % (ref 37–47)
HGB BLD-MCNC: 10.2 G/DL (ref 12–16)
MCH RBC QN AUTO: 29.1 PG (ref 27–31)
MCHC RBC AUTO-ENTMCNC: 33.2 G/DL (ref 33–37)
MCV RBC AUTO: 87.7 FL (ref 81–99)
PERFORMED ON: NORMAL
PLATELET # BLD AUTO: 329 K/UL (ref 130–400)
PMV BLD AUTO: 9.6 FL (ref 9.4–12.3)
RBC # BLD AUTO: 3.5 M/UL (ref 4.2–5.4)
WBC # BLD AUTO: 17.9 K/UL (ref 4.8–10.8)

## 2025-01-29 PROCEDURE — 6360000002 HC RX W HCPCS: Performed by: OBSTETRICS & GYNECOLOGY

## 2025-01-29 PROCEDURE — 36415 COLL VENOUS BLD VENIPUNCTURE: CPT

## 2025-01-29 PROCEDURE — 2500000003 HC RX 250 WO HCPCS: Performed by: OBSTETRICS & GYNECOLOGY

## 2025-01-29 PROCEDURE — 6370000000 HC RX 637 (ALT 250 FOR IP): Performed by: OBSTETRICS & GYNECOLOGY

## 2025-01-29 PROCEDURE — 85027 COMPLETE CBC AUTOMATED: CPT

## 2025-01-29 PROCEDURE — 99232 SBSQ HOSP IP/OBS MODERATE 35: CPT | Performed by: NURSE PRACTITIONER

## 2025-01-29 PROCEDURE — 1220000000 HC SEMI PRIVATE OB R&B

## 2025-01-29 RX ADMIN — INSULIN GLARGINE 40 UNITS: 100 INJECTION, SOLUTION SUBCUTANEOUS at 23:43

## 2025-01-29 RX ADMIN — SODIUM CHLORIDE, PRESERVATIVE FREE 10 ML: 5 INJECTION INTRAVENOUS at 22:11

## 2025-01-29 RX ADMIN — KETOROLAC TROMETHAMINE 30 MG: 30 INJECTION, SOLUTION INTRAMUSCULAR at 16:02

## 2025-01-29 RX ADMIN — POLYETHYLENE GLYCOL 3350 17 G: 17 POWDER, FOR SOLUTION ORAL at 08:21

## 2025-01-29 RX ADMIN — OXYCODONE HYDROCHLORIDE 10 MG: 10 TABLET ORAL at 12:41

## 2025-01-29 RX ADMIN — OXYCODONE HYDROCHLORIDE 10 MG: 10 TABLET ORAL at 16:40

## 2025-01-29 RX ADMIN — OXYCODONE HYDROCHLORIDE 10 MG: 10 TABLET ORAL at 08:22

## 2025-01-29 RX ADMIN — ACETAMINOPHEN 1000 MG: 500 TABLET ORAL at 14:57

## 2025-01-29 RX ADMIN — NIFEDIPINE 30 MG: 30 TABLET, EXTENDED RELEASE ORAL at 08:21

## 2025-01-29 RX ADMIN — KETOROLAC TROMETHAMINE 30 MG: 30 INJECTION, SOLUTION INTRAMUSCULAR at 03:48

## 2025-01-29 RX ADMIN — KETOROLAC TROMETHAMINE 30 MG: 30 INJECTION, SOLUTION INTRAMUSCULAR at 09:42

## 2025-01-29 RX ADMIN — ONDANSETRON 4 MG: 4 TABLET, ORALLY DISINTEGRATING ORAL at 20:56

## 2025-01-29 RX ADMIN — PRENATAL VIT W/ FE FUMARATE-FA TAB 27-0.8 MG 1 TABLET: 27-0.8 TAB at 08:21

## 2025-01-29 RX ADMIN — METFORMIN HYDROCHLORIDE 500 MG: 500 TABLET ORAL at 16:40

## 2025-01-29 RX ADMIN — ASPIRIN 81 MG: 81 TABLET, CHEWABLE ORAL at 08:22

## 2025-01-29 RX ADMIN — SODIUM CHLORIDE, PRESERVATIVE FREE 10 ML: 5 INJECTION INTRAVENOUS at 08:23

## 2025-01-29 RX ADMIN — SIMETHICONE 80 MG: 80 TABLET, CHEWABLE ORAL at 23:43

## 2025-01-29 RX ADMIN — METFORMIN HYDROCHLORIDE 500 MG: 500 TABLET ORAL at 08:21

## 2025-01-29 RX ADMIN — DOCUSATE SODIUM 100 MG: 100 CAPSULE, LIQUID FILLED ORAL at 08:21

## 2025-01-29 RX ADMIN — DOCUSATE SODIUM 100 MG: 100 CAPSULE, LIQUID FILLED ORAL at 22:11

## 2025-01-29 RX ADMIN — KETOROLAC TROMETHAMINE 30 MG: 30 INJECTION, SOLUTION INTRAMUSCULAR at 22:11

## 2025-01-29 RX ADMIN — FERROUS SULFATE TAB 325 MG (65 MG ELEMENTAL FE) 325 MG: 325 (65 FE) TAB at 08:22

## 2025-01-29 RX ADMIN — FERROUS SULFATE TAB 325 MG (65 MG ELEMENTAL FE) 325 MG: 325 (65 FE) TAB at 16:40

## 2025-01-29 RX ADMIN — WATER 1000 MG: 1 INJECTION INTRAMUSCULAR; INTRAVENOUS; SUBCUTANEOUS at 12:41

## 2025-01-29 RX ADMIN — OXYCODONE HYDROCHLORIDE 10 MG: 10 TABLET ORAL at 03:49

## 2025-01-29 RX ADMIN — SIMETHICONE 80 MG: 80 TABLET, CHEWABLE ORAL at 18:05

## 2025-01-29 ASSESSMENT — PAIN SCALES - GENERAL
PAINLEVEL_OUTOF10: 6
PAINLEVEL_OUTOF10: 8
PAINLEVEL_OUTOF10: 7
PAINLEVEL_OUTOF10: 6
PAINLEVEL_OUTOF10: 7
PAINLEVEL_OUTOF10: 6
PAINLEVEL_OUTOF10: 7
PAINLEVEL_OUTOF10: 7

## 2025-01-29 ASSESSMENT — PAIN DESCRIPTION - DESCRIPTORS
DESCRIPTORS: CRAMPING;BURNING;SHARP
DESCRIPTORS: SORE;DISCOMFORT
DESCRIPTORS: SORE
DESCRIPTORS: ACHING;SORE;CRAMPING
DESCRIPTORS: DISCOMFORT;SORE
DESCRIPTORS: DISCOMFORT
DESCRIPTORS: CRAMPING
DESCRIPTORS: SORE

## 2025-01-29 ASSESSMENT — PAIN DESCRIPTION - ORIENTATION
ORIENTATION: LOWER

## 2025-01-29 ASSESSMENT — PAIN - FUNCTIONAL ASSESSMENT
PAIN_FUNCTIONAL_ASSESSMENT: ACTIVITIES ARE NOT PREVENTED

## 2025-01-29 ASSESSMENT — PAIN DESCRIPTION - LOCATION
LOCATION: ABDOMEN;INCISION
LOCATION: ABDOMEN
LOCATION: ABDOMEN
LOCATION: ABDOMEN;INCISION
LOCATION: ABDOMEN;INCISION
LOCATION: ABDOMEN
LOCATION: ABDOMEN;INCISION
LOCATION: ABDOMEN;INCISION

## 2025-01-29 NOTE — PLAN OF CARE
Problem: Pain  Goal: Verbalizes/displays adequate comfort level or baseline comfort level  Outcome: Progressing  Flowsheets (Taken 2025)  Verbalizes/displays adequate comfort level or baseline comfort level:   Encourage patient to monitor pain and request assistance   Assess pain using appropriate pain scale   Administer analgesics based on type and severity of pain and evaluate response     Problem: Postpartum  Goal: Experiences normal postpartum course  Description:  Postpartum OB-Pregnancy care plan goal which identifies if the mother is experiencing a normal postpartum course  Outcome: Progressing  Goal: Appropriate maternal -  bonding  Description:  Postpartum OB-Pregnancy care plan goal which identifies if the mother and  are bonding appropriately  Outcome: Progressing  Goal: Establishment of infant feeding pattern  Description:  Postpartum OB-Pregnancy care plan goal which identifies if the mother is establishing a feeding pattern with their   Outcome: Progressing  Goal: Incisions, wounds, or drain sites healing without S/S of infection  Outcome: Progressing  Flowsheets (Taken 2025)  Incisions, Wounds, or Drain Sites Healing Without Sign and Symptoms of Infection: TWICE DAILY: Assess and document dressing/incision, wound bed, drain sites and surrounding tissue     Problem: Infection - Adult  Goal: Absence of infection at discharge  Outcome: Progressing  Flowsheets (Taken 2025)  Absence of infection at discharge:   Assess and monitor for signs and symptoms of infection   Monitor lab/diagnostic results   Monitor all insertion sites i.e., indwelling lines, tubes and drains  Goal: Absence of infection during hospitalization  Outcome: Progressing  Flowsheets (Taken 2025)  Absence of infection during hospitalization:   Assess and monitor for signs and symptoms of infection   Monitor lab/diagnostic results   Monitor all insertion sites i.e., indwelling

## 2025-01-29 NOTE — CARE COORDINATION
Update: Pt declined notary at this time. Pt reported she is currently legally  , but has been  for over 10 months. Electronically signed by Cecilia Guzman on 1/29/2025 at 12:36 PM

## 2025-01-29 NOTE — LACTATION NOTE
Infant Name: Ciara  Gestation: 36.5  Day of Life: NB  Birth weight: 7-7.6 lb (3390g)  Today's weight:  Weight loss:  24 hour summary of feeds:Attempt x1  Voids:1  Stools: 2  Emesis: 0  Assistive device: none  Maternal History: , HTN, hypothyroidism, depression, insomnia, GDM  Breastfeeding History:  none  Maternal Medications: ASA, lantus, metformin, procardia, protonix  Nipple Assessment: Intact  Maternal Goal: one feeding at a time  Breast pump for home:  needs       Instructed mother to breastfeed every 2- 3 hours for 15-20 mins each side or on demand watching for hunger cues and using waking techniques when needed. 8-12 feedings in 24 hours being the goal. Hand expression and breast compressions encouraged to increase milk supply and transfer. Discussed the benefits of colostrum, skin to skin and the importance of good positioning and latch. Informed mother that baby can be very sleepy the first 24 hours and typically the 2nd night babies will be more awake and want to feed a lot and that this is normal and important in establishing milk supply.

## 2025-01-29 NOTE — LACTATION NOTE
Infant Name: Ciara  Gestation: 36.5  Day of Life: 1  Birth weight: 7-7.6 lb (3390g)  Today's weight: 7-2.8 lb (3255g)  Weight loss: -4%  24 hour summary of feeds: Attempt x 1, Breastfeeding x 1, formula x 3, pump x 1  Voids: 3  Stools: 4  Emesis: 0  Assistive device: none  Maternal History: , HTN, hypothyroidism, depression, insomnia, GDM  Breastfeeding History:  none  Maternal Medications: ASA, lantus, metformin, procardia, protonix  Nipple Assessment: Intact  Maternal Goal: one feeding at a time  Breast pump for home: yes, Spectra    Encouraged mother to start pumping with electric pump. Instructed to continue breastfeed every 2-3 hours for 15-20 mins each side or on demand. Hand expression and breast compression encouraged to increase milk transfer while breastfeeding and pumping. Instructed to pump for 15 mins after breastfeeding, giving baby every drop of colostrum/EBM and then formula feed baby. Basin, soap, syringe, lanolin and spoon given. Encouraged to spoon feed baby if mother gets only a small amount. Lakeshia from [employer] brought mother Spectra electric breast pump.  Measured mothers nipples right nipple measures 20 mm, left nipple measures 22 mm. Encouraged mother to use size 24 flange. All questions answered at this time. Encouraged mother to call out for help with feedings when needed.

## 2025-01-29 NOTE — PROGRESS NOTES
Postpartum Day 1:  Delivery    Patient is a  that delivered on The patient feels well. The patient denies emotional concerns. Pain is well controlled with current medications. The baby is well. Baby is feeding via both breast and bottle - Similac with iron. Urinary output is adequate and she is due to void. The patient is ambulating well. The patient is tolerating a normal diet. Flatus has been passed.    Objective:      Vitals:    25 0822   BP: 123/72   Pulse: 88   Resp: 16   Temp: 97.2 °F (36.2 °C)   SpO2: 97%         General:    alert, appears stated age, and cooperative   Bowel Sounds:  active   Lochia:  appropriate   Uterine Fundus:   firm   Incision:  healing well, no significant drainage, no dehiscence, no significant erythema   DVT Evaluation:  No evidence of DVT seen on physical exam.     Lab Results   Component Value Date    WBC 13.2 (H) 2025    HGB 17.4 2025    HCT 38.7 2025    MCV 85.8 2025     2025     Assessment:     Status post  section. Doing well postoperatively.     Plan:     Continue current care. To get out of bed, ambulate, void.   Over 50% of the total visit time of 35 minutes was spent on counseling and/or coordination of care. All questions were answered and the patient voiced understanding.

## 2025-01-29 NOTE — CARE COORDINATION
Consult: VINAY Brooks contacted [facility] Hotline to make a report Pt (mother) UDS lab confirmed was negative on admission and  UDS lab confirmed was positive for fentanyl Pt mother received fentanyl in epidural. Pt mother history of THC during pregnancy. Cord was sent and currently pending as of 25. This writer spoke to centralized intake report ID # [id number].   25 [facility] report made #[id number]. Electronically signed by Cecilia Guzman on 2025 at 10:42 AM

## 2025-01-30 LAB — RPR SER QL: NORMAL

## 2025-01-30 PROCEDURE — 6370000000 HC RX 637 (ALT 250 FOR IP): Performed by: NURSE PRACTITIONER

## 2025-01-30 PROCEDURE — 1220000000 HC SEMI PRIVATE OB R&B

## 2025-01-30 PROCEDURE — 6370000000 HC RX 637 (ALT 250 FOR IP): Performed by: OBSTETRICS & GYNECOLOGY

## 2025-01-30 PROCEDURE — 82962 GLUCOSE BLOOD TEST: CPT

## 2025-01-30 PROCEDURE — 99232 SBSQ HOSP IP/OBS MODERATE 35: CPT | Performed by: NURSE PRACTITIONER

## 2025-01-30 RX ORDER — PANTOPRAZOLE SODIUM 40 MG/1
40 TABLET, DELAYED RELEASE ORAL
Status: DISCONTINUED | OUTPATIENT
Start: 2025-01-30 | End: 2025-01-31 | Stop reason: HOSPADM

## 2025-01-30 RX ORDER — GLUCAGON 1 MG/ML
1 KIT INJECTION PRN
Status: DISCONTINUED | OUTPATIENT
Start: 2025-01-30 | End: 2025-01-31 | Stop reason: HOSPADM

## 2025-01-30 RX ORDER — DEXTROSE MONOHYDRATE 100 MG/ML
INJECTION, SOLUTION INTRAVENOUS CONTINUOUS PRN
Status: DISCONTINUED | OUTPATIENT
Start: 2025-01-30 | End: 2025-01-31 | Stop reason: HOSPADM

## 2025-01-30 RX ADMIN — METFORMIN HYDROCHLORIDE 500 MG: 500 TABLET ORAL at 08:22

## 2025-01-30 RX ADMIN — OXYCODONE HYDROCHLORIDE 10 MG: 10 TABLET ORAL at 08:29

## 2025-01-30 RX ADMIN — ACETAMINOPHEN 1000 MG: 500 TABLET ORAL at 23:26

## 2025-01-30 RX ADMIN — OXYCODONE HYDROCHLORIDE 10 MG: 10 TABLET ORAL at 12:26

## 2025-01-30 RX ADMIN — DOCUSATE SODIUM 100 MG: 100 CAPSULE, LIQUID FILLED ORAL at 08:21

## 2025-01-30 RX ADMIN — OXYCODONE HYDROCHLORIDE 10 MG: 10 TABLET ORAL at 16:33

## 2025-01-30 RX ADMIN — INSULIN GLARGINE 40 UNITS: 100 INJECTION, SOLUTION SUBCUTANEOUS at 23:27

## 2025-01-30 RX ADMIN — ONDANSETRON 4 MG: 4 TABLET, ORALLY DISINTEGRATING ORAL at 12:26

## 2025-01-30 RX ADMIN — IBUPROFEN 800 MG: 400 TABLET, FILM COATED ORAL at 12:26

## 2025-01-30 RX ADMIN — PANTOPRAZOLE SODIUM 40 MG: 40 TABLET, DELAYED RELEASE ORAL at 08:22

## 2025-01-30 RX ADMIN — NIFEDIPINE 30 MG: 30 TABLET, EXTENDED RELEASE ORAL at 08:22

## 2025-01-30 RX ADMIN — FERROUS SULFATE TAB 325 MG (65 MG ELEMENTAL FE) 325 MG: 325 (65 FE) TAB at 08:22

## 2025-01-30 RX ADMIN — ASPIRIN 81 MG: 81 TABLET, CHEWABLE ORAL at 08:21

## 2025-01-30 RX ADMIN — PRENATAL VIT W/ FE FUMARATE-FA TAB 27-0.8 MG 1 TABLET: 27-0.8 TAB at 08:22

## 2025-01-30 RX ADMIN — IBUPROFEN 800 MG: 400 TABLET, FILM COATED ORAL at 20:42

## 2025-01-30 RX ADMIN — ACETAMINOPHEN 1000 MG: 500 TABLET ORAL at 16:33

## 2025-01-30 RX ADMIN — DOCUSATE SODIUM 100 MG: 100 CAPSULE, LIQUID FILLED ORAL at 20:42

## 2025-01-30 RX ADMIN — OXYCODONE HYDROCHLORIDE 10 MG: 10 TABLET ORAL at 03:58

## 2025-01-30 RX ADMIN — ACETAMINOPHEN 1000 MG: 500 TABLET ORAL at 08:21

## 2025-01-30 RX ADMIN — SIMETHICONE 80 MG: 80 TABLET, CHEWABLE ORAL at 12:26

## 2025-01-30 RX ADMIN — IBUPROFEN 800 MG: 400 TABLET, FILM COATED ORAL at 03:58

## 2025-01-30 ASSESSMENT — PAIN DESCRIPTION - ORIENTATION
ORIENTATION: LOWER

## 2025-01-30 ASSESSMENT — PAIN DESCRIPTION - LOCATION
LOCATION: ABDOMEN
LOCATION: PERINEUM

## 2025-01-30 ASSESSMENT — PAIN SCALES - GENERAL
PAINLEVEL_OUTOF10: 7
PAINLEVEL_OUTOF10: 10
PAINLEVEL_OUTOF10: 0
PAINLEVEL_OUTOF10: 4
PAINLEVEL_OUTOF10: 1
PAINLEVEL_OUTOF10: 10
PAINLEVEL_OUTOF10: 6

## 2025-01-30 ASSESSMENT — PAIN DESCRIPTION - DESCRIPTORS
DESCRIPTORS: SORE
DESCRIPTORS: CRAMPING
DESCRIPTORS: CRAMPING;BURNING

## 2025-01-30 ASSESSMENT — PAIN - FUNCTIONAL ASSESSMENT: PAIN_FUNCTIONAL_ASSESSMENT: ACTIVITIES ARE NOT PREVENTED

## 2025-01-30 NOTE — CARE COORDINATION
Consult: Pt requested [facility] for paternity documents before D/C. Electronically signed by Cecilia Guzman on 1/30/2025 at 6:05 AM

## 2025-01-30 NOTE — PROGRESS NOTES
Postpartum Day 2:  Delivery    Patient is a  that delivered on The patient feels well. The patient denies emotional concerns. Pain is well controlled with current medications. The baby iswell. Baby is feeding via bottle - Similac with iron. Urinary output is adequate. The patient is ambulating well. The patient is tolerating a normal diet. Flatus has been passed. Taking Mylicon for \"gas pains.\" Also requesting medication for heartburn. Has taken Protonix in the past with relief. Questions regarding if she needs to continue her Metformin and Lantus. Failed early 1 hour and 3 hour GTT at 15 weeks.    Objective:      Patient Vitals for the past 8 hrs:  Vitals:    25 2332   BP: (!) 103/48   Pulse: 87   Resp: 18   Temp: 98.5 °F (36.9 °C)   SpO2: 97%       General:    alert, appears stated age, and cooperative   Bowel Sounds:  active   Lochia:  appropriate   Uterine Fundus:   firm   Incision:  healing well, no significant drainage, no dehiscence, no significant erythema   DVT Evaluation:  No evidence of DVT seen on physical exam.     Lab Results   Component Value Date    WBC 17.9 (H) 2025    HGB 10.2 (L) 2025    HCT 30.7 (L) 2025    MCV 87.7 2025     2025     Assessment:     Status post  section. Doing well postoperatively.     Plan:     Continue current care. Start Protonix. Reviewed GTT- discussed failing early GTT can mean possibly Type 2 DM or insulin resistance. Can have further testing in the future. Will discuss with Dr Wilde regarding continuing those medications at this time.     Over 50% of the total visit time of 35 minutes was spent on counseling and/or coordination of care. All questions were answered and the patient voiced understanding.

## 2025-01-30 NOTE — LACTATION NOTE
Infant Name: Ciara  Gestation: 36.5  Day of Life: 2  Birth weight: 7-7.6 lb (3390g)  25: 7-2.8 lb (3255g)  Today's weight: 7-1.1 lb (3205g)  Weight loss: -5.46%  24 hour summary of feeds:  formula x 9  Voids: 9  Stools: 5  Emesis: 0  Assistive device: none  Maternal History: , HTN, hypothyroidism, depression, insomnia, GDM  Breastfeeding History:  none  Maternal Medications: ASA, lantus, metformin, procardia, protonix  Nipple Assessment: Intact  Maternal Goal: one feeding at a time  Breast pump for home: yes, Spectra    Encouraged mother to start pumping with electric pump. Instructed to continue breastfeed every 2-3 hours for 15-20 mins each side or on demand. Hand expression and breast compression encouraged to increase milk transfer while breastfeeding and pumping. Instructed to pump for 15 mins after breastfeeding, giving baby every drop of colostrum/EBM and then formula feed baby. Basin, soap, syringe, lanolin and spoon given. Encouraged to spoon feed baby if mother gets only a small amount. Measured mothers nipples right nipple measures 20 mm, left nipple measures 22 mm. Encouraged mother to use size 24 flange. All questions answered at this time. Encouraged mother to call out for help with feedings when needed.     Instructions and handouts given over management of sore nipples, engorgement, plugged ducts, mastitis, hydration, nutrition, and medications that could effect milk supply. Mother knows when to call MD if needed. Lactation number and hours provided. Mother knows she can call and make appointment for pre and post feeding weights whenever needed or can call with questions or concerns her entire breastfeeding journey. All questions at this time answered. Support and Encouragement given.

## 2025-01-30 NOTE — CARE COORDINATION
( update)    Consult: SW reviewed  cord results they are currently pending at this time. Electronically signed by Cecilia Guzman on 2025 at 10:12 AM

## 2025-01-31 ENCOUNTER — READMISSION MANAGEMENT (OUTPATIENT)
Dept: CALL CENTER | Facility: HOSPITAL | Age: 35
End: 2025-01-31
Payer: COMMERCIAL

## 2025-01-31 VITALS
DIASTOLIC BLOOD PRESSURE: 88 MMHG | HEART RATE: 92 BPM | RESPIRATION RATE: 18 BRPM | WEIGHT: 293 LBS | TEMPERATURE: 98.2 F | SYSTOLIC BLOOD PRESSURE: 154 MMHG | HEIGHT: 70 IN | BODY MASS INDEX: 41.95 KG/M2 | OXYGEN SATURATION: 97 %

## 2025-01-31 PROCEDURE — 99239 HOSP IP/OBS DSCHRG MGMT >30: CPT | Performed by: NURSE PRACTITIONER

## 2025-01-31 PROCEDURE — 6370000000 HC RX 637 (ALT 250 FOR IP): Performed by: OBSTETRICS & GYNECOLOGY

## 2025-01-31 PROCEDURE — 6370000000 HC RX 637 (ALT 250 FOR IP): Performed by: NURSE PRACTITIONER

## 2025-01-31 RX ORDER — PRENATAL VIT/IRON FUM/FOLIC AC 27MG-0.8MG
1 TABLET ORAL DAILY
Qty: 30 TABLET | Refills: 0 | Status: SHIPPED | OUTPATIENT
Start: 2025-01-31

## 2025-01-31 RX ORDER — PSEUDOEPHEDRINE HCL 30 MG
100 TABLET ORAL 2 TIMES DAILY
Qty: 30 CAPSULE | Refills: 0 | Status: SHIPPED | OUTPATIENT
Start: 2025-01-31

## 2025-01-31 RX ORDER — FERROUS SULFATE 325(65) MG
325 TABLET ORAL 2 TIMES DAILY WITH MEALS
Qty: 30 TABLET | Refills: 3 | Status: SHIPPED | OUTPATIENT
Start: 2025-01-31

## 2025-01-31 RX ORDER — PANTOPRAZOLE SODIUM 40 MG/1
40 TABLET, DELAYED RELEASE ORAL
Qty: 30 TABLET | Refills: 3 | Status: SHIPPED | OUTPATIENT
Start: 2025-01-31

## 2025-01-31 RX ORDER — IBUPROFEN 800 MG/1
800 TABLET, FILM COATED ORAL EVERY 8 HOURS PRN
Qty: 30 TABLET | Refills: 0 | Status: SHIPPED | OUTPATIENT
Start: 2025-01-31

## 2025-01-31 RX ORDER — OXYCODONE HYDROCHLORIDE 5 MG/1
5 TABLET ORAL EVERY 4 HOURS PRN
Qty: 18 TABLET | Refills: 0 | Status: SHIPPED | OUTPATIENT
Start: 2025-01-31 | End: 2025-02-03

## 2025-01-31 RX ADMIN — ACETAMINOPHEN 650 MG: 325 TABLET ORAL at 08:37

## 2025-01-31 RX ADMIN — DOCUSATE SODIUM 100 MG: 100 CAPSULE, LIQUID FILLED ORAL at 08:34

## 2025-01-31 RX ADMIN — PRENATAL VIT W/ FE FUMARATE-FA TAB 27-0.8 MG 1 TABLET: 27-0.8 TAB at 08:34

## 2025-01-31 RX ADMIN — METFORMIN HYDROCHLORIDE 500 MG: 500 TABLET ORAL at 08:34

## 2025-01-31 RX ADMIN — ASPIRIN 81 MG: 81 TABLET, CHEWABLE ORAL at 08:34

## 2025-01-31 RX ADMIN — PANTOPRAZOLE SODIUM 40 MG: 40 TABLET, DELAYED RELEASE ORAL at 08:34

## 2025-01-31 RX ADMIN — IBUPROFEN 800 MG: 400 TABLET, FILM COATED ORAL at 03:06

## 2025-01-31 RX ADMIN — NIFEDIPINE 30 MG: 30 TABLET, EXTENDED RELEASE ORAL at 08:34

## 2025-01-31 RX ADMIN — FERROUS SULFATE TAB 325 MG (65 MG ELEMENTAL FE) 325 MG: 325 (65 FE) TAB at 08:34

## 2025-01-31 ASSESSMENT — PAIN DESCRIPTION - LOCATION
LOCATION: HEAD
LOCATION: BACK;BUTTOCKS

## 2025-01-31 ASSESSMENT — PAIN SCALES - GENERAL
PAINLEVEL_OUTOF10: 2
PAINLEVEL_OUTOF10: 3

## 2025-01-31 ASSESSMENT — PAIN DESCRIPTION - DESCRIPTORS
DESCRIPTORS: ACHING;SORE
DESCRIPTORS: ACHING

## 2025-01-31 ASSESSMENT — PAIN DESCRIPTION - ORIENTATION: ORIENTATION: POSTERIOR;LOWER

## 2025-01-31 NOTE — PLAN OF CARE
Problem: Pain  Goal: Verbalizes/displays adequate comfort level or baseline comfort level  2025 234 by Elizabeth Sy RN  Outcome: Progressing  2025 by Ngoc Odell RN  Outcome: Progressing     Problem: Postpartum  Goal: Experiences normal postpartum course  Description:  Postpartum OB-Pregnancy care plan goal which identifies if the mother is experiencing a normal postpartum course  2025 234 by Elizabeth Sy RN  Outcome: Progressing  2025 by Ngoc Odell RN  Outcome: Progressing  Goal: Appropriate maternal -  bonding  Description:  Postpartum OB-Pregnancy care plan goal which identifies if the mother and  are bonding appropriately  2025 234 by Elizabeth Sy RN  Outcome: Progressing  2025 by Ngoc Odell RN  Outcome: Progressing  Goal: Establishment of infant feeding pattern  Description:  Postpartum OB-Pregnancy care plan goal which identifies if the mother is establishing a feeding pattern with their   2025 2340 by Elizabeth Sy RN  Outcome: Progressing  2025 by Ngoc Odell RN  Outcome: Progressing  Goal: Incisions, wounds, or drain sites healing without S/S of infection  2025 234 by Elizabeth Sy RN  Outcome: Progressing  2025 by Ngoc Odell RN  Outcome: Progressing     Problem: Infection - Adult  Goal: Absence of infection at discharge  2025 2340 by Elizabeth Sy RN  Outcome: Progressing  2025 by Ngoc Odell RN  Outcome: Progressing  Goal: Absence of infection during hospitalization  2025 2340 by Elizabeth Sy RN  Outcome: Progressing  2025 by Ngoc Odell RN  Outcome: Progressing  Goal: Absence of fever/infection during anticipated neutropenic period  2025 2340 by Elizabeth Sy RN  Outcome: Progressing  2025 by Ngoc Odell RN  Outcome: Progressing     Problem: Safety - Adult  Goal:

## 2025-01-31 NOTE — DISCHARGE SUMMARY
Postpartum Day 3:  Delivery    Patient is a  that delivered on The patient feels well. The patient denies emotional concerns. Pain is well controlled with current medications. The baby iswell. Baby is feeding via both breast and bottle - Similac with iron. Urinary output is adequate. The patient is ambulating well. The patient is tolerating a normal diet. Flatus has been passed.    Objective:      Patient Vitals for the past 8 hrs:  Vitals:    25 0039   BP: (!) 142/83   Pulse: 93   Resp: 16   Temp: 98.1 °F (36.7 °C)   SpO2: 98%       General:    alert, appears stated age, and cooperative   Bowel Sounds:  active   Lochia:  appropriate   Uterine Fundus:   firm   Incision:  healing well, no significant drainage, no dehiscence, no significant erythema   DVT Evaluation:  No evidence of DVT seen on physical exam.     Lab Results   Component Value Date    WBC 17.9 (H) 2025    HGB 10.2 (L) 2025    HCT 30.7 (L) 2025    MCV 87.7 2025     2025     Assessment:     Status post  section. Doing well postoperatively.     Plan:     Continue current care. Discharge home with standard and diabetes, HTN precautions with pt understanding. F/u as scheduled on 25 for wound vac removal and BP check, sooner with any problems.    Over 50% of the total visit time of 35 minutes was spent on counseling and/or coordination of care. All questions were answered and the patient voiced understanding.

## 2025-01-31 NOTE — FLOWSHEET NOTE
Discharge teaching reviewed. All questions answered. Follow up appointments made. Pt leaving unit per wheelchair.

## 2025-01-31 NOTE — OUTREACH NOTE
Prep Survey      Flowsheet Row Responses   [facility] [employer] facility patient discharged from? Non-[facility]   Is LACE score < 7 ? Non-BH Discharge   Eligibility Not Eligible   What are the reasons patient is not eligible? Other  [Maternal]   Does the patient have one of the following disease processes/diagnoses(primary or secondary)? Other   Prep survey completed? Yes            Shreya CHRISTIANSON - Registered Nurse

## 2025-01-31 NOTE — PROGRESS NOTES
Pt refused wheelchair to the main entrance. Pt ambulatory without difficulty.    Electronically signed by Allison Chavez RN on 1/31/2025 at 10:07 AM

## 2025-02-01 NOTE — OP NOTE
Operative Note      Patient: Magy Samuel  YOB: 1990  MRN: [id number]    Date of Procedure: 2025    Preop Diagnosis  IUP at 36 5/7 wks  Gestational HTN  GDM    Post-Op Diagnosis: Same       Procedure(s):   SECTION    Surgeon(s):  Tyra Conn MD    Assistant:   First Assistant: Jacqueline Soto    Anesthesia: Spinal    Estimated Blood Loss (mL): 1000    Complications: None    Specimens:   ID Type Source Tests Collected by Time Destination   A :  Tissue Placenta SURGICAL PATHOLOGY Tyra Conn MD 2025 1751        Implants:  * No implants in log *      Drains:   [REMOVED] Urinary Catheter 25 Pérez (Removed)   Urine Color Margarita 25 0409   Output (mL) 450 mL 25 0409       Findings:  Infection Present At Time Of Surgery (PATOS) (choose all levels that have infection present):  No infection present  Other Findings: Normal uterus, tubes and ovaries    Detailed Description of Procedure:   The patient was seen in the Holding Room. The risks, benefits, complications, treatment options, and expected outcomes were discussed with the patient.  The patient concurred with the proposed plan, giving informed consent.  The site of surgery properly noted/marked. The patient was taken to the Operating Room  identified as Magy Samuel and the procedure verified as  Delivery. A Time Out was held and the above information confirmed.    After induction of anesthesia, the patient was draped and prepped in the usual sterile manner. A Pfannenstiel incision was made and carried down through the subcutaneous tissue to the fascia. Fascial incision was made and extended transversely. The fascia was  from the underlying rectus tissue superiorly and inferiorly. The peritoneum was identified and entered. Peritoneal incision was extended longitudinally. A low transverse uterine incision was made.  Delivered from vertex presentation was a 3390 gram female with  Apgar scores of 7 at one minute and 9 at five minutes. After the umbilical cord was clamped and cut cord blood was obtained for evaluation. The placenta was removed intact and appeared normal. The uterine outline, tubes and ovaries appeared normal. The uterus was exteriorized and incision was closed with running sutures of 0 Vicryl. A second imbricating layer of the same suture was placed with excellent Hemostasis obtained. Posterior and paracolic gutters were cleared of all clots and debris.  The uterus was returned to the abdomen and Interceed was placed over the incision.  Peritoneum was closed using 2-0 vicryl in a running fashion.  The fascia was then reapproximated with running sutures of 1-Stratafix. The skin was closed with 3-0 monocryl in a subcuticular fashion.      Instrument, sponge, and needle counts were correct prior the abdominal closure and at the conclusion of the case.     Intake/Output:     Date 25 1901 - 25 0700 25 0701 - 25 0700(Discharged)   Shift 2591-1341 24 Hour Total 4628-6608 7968-1510 24 Hour Total   INTAKE   I.V.   2896.4  2896.4   Shift Total   2896.4  2896.4   OUTPUT   Shift Total        NET   2896.4  2896.4                     Implants: Interceed           Complications:  none         Disposition: PACU - hemodynamically stable.           Condition: infant stable to general nursery        Electronically signed by Tyra Jeronimo MD

## 2025-02-04 NOTE — CARE COORDINATION
Update: VINAY Brooks reviewed  chart for cord results, cord results are negative at this time. Electronically signed by Cecilia Guzman on 2025 at 8:55 AM

## 2025-02-10 RX ORDER — FERROUS SULFATE 325(65) MG
1 TABLET ORAL 2 TIMES DAILY WITH MEALS
Qty: 180 TABLET | Refills: 1 | Status: SHIPPED | OUTPATIENT
Start: 2025-02-10

## 2025-02-10 NOTE — H&P
Magy Samuel is a 34 y.o. female patient who presents from clinic with uncontrolled hypertension.    Past Medical History:   Diagnosis Date    Acute insomnia     Adult hypothyroidism     Depression     Gestational diabetes     HTN (hypertension)     Kidney stone     Morbid obesity with BMI of 45.0-49.9, adult      OB History          4    Para   1    Term           1    AB   3    Living   1         SAB   3    IAB        Ectopic        Molar        Multiple   0    Live Births   1              36w5d  Estimated Date of Delivery: 25  Allergies   Allergen Reactions    Penicillins Hives     Principal Problem:    36 weeks gestation of pregnancy  Active Problems:    Obesity in pregnancy    Gestational diabetes mellitus (GDM), antepartum  Resolved Problems:    * No resolved hospital problems. *    Blood pressure (!) 154/88, pulse 92, temperature 98.2 °F (36.8 °C), temperature source Oral, resp. rate 18, height 1.778 m (5' 10\"), weight (!) 155.1 kg (342 lb), last menstrual period 2024, SpO2 97%, unknown if currently breastfeeding.    Maternal Medical History:   Fetal activity: Perceived fetal activity is normal.    Prenatal complications: PIH.    Prenatal Complications - Diabetes: gestational.      Maternal Exam:   Abdomen: Fetal presentation: vertex  Introitus: Normal vulva. Normal vagina.  Pelvis: adequate for delivery.        Fetal Exam  Fetal Monitor Review: Mode: ultrasound.    Variability: moderate (6-25 bpm).    Pattern: accelerations present and no decelerations.    Fetal State Assessment: Category I - tracings are normal.      Assessment:  IUP at 36 4/7 wks  Uncontrolled HTN  GDM  Obesity in pregnancy    Plan:  Admit for IOL  Routine intrapartum care  Resume home meds  Analgesia per patient request      Tyra Jeronimo MD

## 2025-03-18 ENCOUNTER — PATIENT MESSAGE (OUTPATIENT)
Dept: OBGYN CLINIC | Age: 35
End: 2025-03-18

## 2025-03-25 NOTE — PATIENT INSTRUCTIONS
Hypothyroidism  Hypothyroidism is a disorder of the thyroid. The thyroid is a large gland that is located in the lower front of the neck. The thyroid releases hormones that control how the body works. With hypothyroidism, the thyroid does not make enough of these hormones.  What are the causes?  Causes of hypothyroidism may include:  · Viral infections.  · Pregnancy.  · Your own defense system (immune system) attacking your thyroid.  · Certain medicines.  · Birth defects.  · Past radiation treatments to your head or neck.  · Past treatment with radioactive iodine.  · Past surgical removal of part or all of your thyroid.  · Problems with the gland that is located in the center of your brain (pituitary).  What are the signs or symptoms?  Signs and symptoms of hypothyroidism may include:  · Feeling as though you have no energy (lethargy).  · Inability to tolerate cold.  · Weight gain that is not explained by a change in diet or exercise habits.  · Dry skin.  · Coarse hair.  · Menstrual irregularity.  · Slowing of thought processes.  · Constipation.  · Sadness or depression.  How is this diagnosed?  Your health care provider may diagnose hypothyroidism with blood tests and ultrasound tests.  How is this treated?  Hypothyroidism is treated with medicine that replaces the hormones that your body does not make. After you begin treatment, it may take several weeks for symptoms to go away.  Follow these instructions at home:  · Take medicines only as directed by your health care provider.  · If you start taking any new medicines, tell your health care provider.  · Keep all follow-up visits as directed by your health care provider. This is important. As your condition improves, your dosage needs may change. You will need to have blood tests regularly so that your health care provider can watch your condition.  Contact a health care provider if:  · Your symptoms do not get better with treatment.  · You are taking thyroid  Routing to covering provider to review and advise.     Pepper Ponce RN      replacement medicine and:  ¨ You sweat excessively.  ¨ You have tremors.  ¨ You feel anxious.  ¨ You lose weight rapidly.  ¨ You cannot tolerate heat.  ¨ You have emotional swings.  ¨ You have diarrhea.  ¨ You feel weak.  Get help right away if:  · You develop chest pain.  · You develop an irregular heartbeat.  · You develop a rapid heartbeat.  This information is not intended to replace advice given to you by your health care provider. Make sure you discuss any questions you have with your health care provider.  Document Released: 12/18/2006 Document Revised: 05/25/2017 Document Reviewed: 05/05/2015  [employer] Interactive Patient Education © 2017 [employer]  Heartburn  Heartburn is a type of pain or discomfort that can happen in the throat or chest. It is often described as a burning pain. It may also cause a bad taste in the mouth. Heartburn may feel worse when you lie down or bend over. It may be caused by stomach contents that move back up (reflux) into the tube that connects the mouth with the stomach (esophagus).  Follow these instructions at home:  Take these actions to lessen your discomfort and to help avoid problems.  Diet   · Follow a diet as told by your doctor. You may need to avoid foods and drinks such as:  ¨ Coffee and tea (with or without caffeine).  ¨ Drinks that contain alcohol.  ¨ Energy drinks and sports drinks.  ¨ Carbonated drinks or sodas.  ¨ Chocolate and cocoa.  ¨ Peppermint and mint flavorings.  ¨ Garlic and onions.  ¨ Horseradish.  ¨ Spicy and acidic foods, such as peppers, chili powder, link powder, vinegar, hot sauces, and BBQ sauce.  ¨ Citrus fruit juices and citrus fruits, such as oranges, yannick, and limes.  ¨ Tomato-based foods, such as red sauce, chili, salsa, and pizza with red sauce.  ¨ Fried and fatty foods, such as donuts, french fries, potato chips, and high-fat dressings.  ¨ High-fat meats, such as hot dogs, rib eye steak, sausage, ham, and kent.  ¨ High-fat dairy items,  such as whole milk, butter, and cream cheese.  · Eat small meals often. Avoid eating large meals.  · Avoid drinking large amounts of liquid with your meals.  · Avoid eating meals during the 2-3 hours before bedtime.  · Avoid lying down right after you eat.  · Do not exercise right after you eat.  General instructions   · Pay attention to any changes in your symptoms.  · Take over-the-counter and prescription medicines only as told by your doctor. Do not take aspirin, ibuprofen, or other NSAIDs unless your doctor says it is okay.  · Do not use any tobacco products, including cigarettes, chewing tobacco, and e-cigarettes. If you need help quitting, ask your doctor.  · Wear loose clothes. Do not wear anything tight around your waist.  · Raise (elevate) the head of your bed about 6 inches (15 cm).  · Try to lower your stress. If you need help doing this, ask your doctor.  · If you are overweight, lose an amount of weight that is healthy for you. Ask your doctor about a safe weight loss goal.  · Keep all follow-up visits as told by your doctor. This is important.  Contact a doctor if:  · You have new symptoms.  · You lose weight and you do not know why it is happening.  · You have trouble swallowing, or it hurts to swallow.  · You have wheezing or a cough that keeps happening.  · Your symptoms do not get better with treatment.  · You have heartburn often for more than two weeks.  Get help right away if:  · You have pain in your arms, neck, jaw, teeth, or back.  · You feel sweaty, dizzy, or light-headed.  · You have chest pain or shortness of breath.  · You throw up (vomit) and your throw up looks like blood or coffee grounds.  · Your poop (stool) is bloody or black.  This information is not intended to replace advice given to you by your health care provider. Make sure you discuss any questions you have with your health care provider.  Document Released: 08/29/2012 Document Revised: 05/25/2017 Document Reviewed:  "04/13/2016  [employer] Interactive Patient Education © 2017 [employer]  Hypertension  Hypertension, commonly called high blood pressure, is when the force of blood pumping through the arteries is too strong. The arteries are the blood vessels that carry blood from the heart throughout the body. Hypertension forces the heart to work harder to pump blood and may cause arteries to become narrow or stiff. Having untreated or uncontrolled hypertension can cause heart attacks, strokes, kidney disease, and other problems.  A blood pressure reading consists of a higher number over a lower number. Ideally, your blood pressure should be below 120/80. The first (\"top\") number is called the systolic pressure. It is a measure of the pressure in your arteries as your heart beats. The second (\"bottom\") number is called the diastolic pressure. It is a measure of the pressure in your arteries as the heart relaxes.  What are the causes?  The cause of this condition is not known.  What increases the risk?  Some risk factors for high blood pressure are under your control. Others are not.  Factors you can change   · Smoking.  · Having type 2 diabetes mellitus, high cholesterol, or both.  · Not getting enough exercise or physical activity.  · Being overweight.  · Having too much fat, sugar, calories, or salt (sodium) in your diet.  · Drinking too much alcohol.  Factors that are difficult or impossible to change   · Having chronic kidney disease.  · Having a family history of high blood pressure.  · Age. Risk increases with age.  · Race. You may be at higher risk if you are -American.  · Gender. Men are at higher risk than women before age 45. After age 65, women are at higher risk than men.  · Having obstructive sleep apnea.  · Stress.  What are the signs or symptoms?  Extremely high blood pressure (hypertensive crisis) may cause:  · Headache.  · Anxiety.  · Shortness of breath.  · Nosebleed.  · Nausea and vomiting.  · Severe " chest pain.  · Jerky movements you cannot control (seizures).  How is this diagnosed?  This condition is diagnosed by measuring your blood pressure while you are seated, with your arm resting on a surface. The cuff of the blood pressure monitor will be placed directly against the skin of your upper arm at the level of your heart. It should be measured at least twice using the same arm. Certain conditions can cause a difference in blood pressure between your right and left arms.  Certain factors can cause blood pressure readings to be lower or higher than normal (elevated) for a short period of time:  · When your blood pressure is higher when you are in a health care provider's office than when you are at home, this is called white coat hypertension. Most people with this condition do not need medicines.  · When your blood pressure is higher at home than when you are in a health care provider's office, this is called masked hypertension. Most people with this condition may need medicines to control blood pressure.  If you have a high blood pressure reading during one visit or you have normal blood pressure with other risk factors:  · You may be asked to return on a different day to have your blood pressure checked again.  · You may be asked to monitor your blood pressure at home for 1 week or longer.  If you are diagnosed with hypertension, you may have other blood or imaging tests to help your health care provider understand your overall risk for other conditions.  How is this treated?  This condition is treated by making healthy lifestyle changes, such as eating healthy foods, exercising more, and reducing your alcohol intake. Your health care provider may prescribe medicine if lifestyle changes are not enough to get your blood pressure under control, and if:  · Your systolic blood pressure is above 130.  · Your diastolic blood pressure is above 80.  Your personal target blood pressure may vary depending on your  medical conditions, your age, and other factors.  Follow these instructions at home:  Eating and drinking   · Eat a diet that is high in fiber and potassium, and low in sodium, added sugar, and fat. An example eating plan is called the DASH (Dietary Approaches to Stop Hypertension) diet. To eat this way:  ¨ Eat plenty of fresh fruits and vegetables. Try to fill half of your plate at each meal with fruits and vegetables.  ¨ Eat whole grains, such as whole wheat pasta, brown rice, or whole grain bread. Fill about one quarter of your plate with whole grains.  ¨ Eat or drink low-fat dairy products, such as skim milk or low-fat yogurt.  ¨ Avoid fatty cuts of meat, processed or cured meats, and poultry with skin. Fill about one quarter of your plate with lean proteins, such as fish, chicken without skin, beans, eggs, and tofu.  ¨ Avoid premade and processed foods. These tend to be higher in sodium, added sugar, and fat.  · Reduce your daily sodium intake. Most people with hypertension should eat less than 1,500 mg of sodium a day.  · Limit alcohol intake to no more than 1 drink a day for nonpregnant women and 2 drinks a day for men. One drink equals 12 oz of beer, 5 oz of wine, or 1½ oz of hard liquor.  Lifestyle   · Work with your health care provider to maintain a healthy body weight or to lose weight. Ask what an ideal weight is for you.  · Get at least 30 minutes of exercise that causes your heart to beat faster (aerobic exercise) most days of the week. Activities may include walking, swimming, or biking.  · Include exercise to strengthen your muscles (resistance exercise), such as pilates or lifting weights, as part of your weekly exercise routine. Try to do these types of exercises for 30 minutes at least 3 days a week.  · Do not use any products that contain nicotine or tobacco, such as cigarettes and e-cigarettes. If you need help quitting, ask your health care provider.  · Monitor your blood pressure at home as  told by your health care provider.  · Keep all follow-up visits as told by your health care provider. This is important.  Medicines   · Take over-the-counter and prescription medicines only as told by your health care provider. Follow directions carefully. Blood pressure medicines must be taken as prescribed.  · Do not skip doses of blood pressure medicine. Doing this puts you at risk for problems and can make the medicine less effective.  · Ask your health care provider about side effects or reactions to medicines that you should watch for.  Contact a health care provider if:  · You think you are having a reaction to a medicine you are taking.  · You have headaches that keep coming back (recurring).  · You feel dizzy.  · You have swelling in your ankles.  · You have trouble with your vision.  Get help right away if:  · You develop a severe headache or confusion.  · You have unusual weakness or numbness.  · You feel faint.  · You have severe pain in your chest or abdomen.  · You vomit repeatedly.  · You have trouble breathing.  Summary  · Hypertension is when the force of blood pumping through your arteries is too strong. If this condition is not controlled, it may put you at risk for serious complications.  · Your personal target blood pressure may vary depending on your medical conditions, your age, and other factors. For most people, a normal blood pressure is less than 120/80.  · Hypertension is treated with lifestyle changes, medicines, or a combination of both. Lifestyle changes include weight loss, eating a healthy, low-sodium diet, exercising more, and limiting alcohol.  This information is not intended to replace advice given to you by your health care provider. Make sure you discuss any questions you have with your health care provider.  Document Released: 12/18/2006 Document Revised: 11/15/2017 Document Reviewed: 11/15/2017  [employer] Interactive Patient Education © 2017 [employer].  Steps to Quit  Smoking  Smoking tobacco can be bad for your health. It can also affect almost every organ in your body. Smoking puts you and people around you at risk for many serious long-lasting (chronic) diseases. Quitting smoking is hard, but it is one of the best things that you can do for your health. It is never too late to quit.  What are the benefits of quitting smoking?  When you quit smoking, you lower your risk for getting serious diseases and conditions. They can include:  · Lung cancer or lung disease.  · Heart disease.  · Stroke.  · Heart attack.  · Not being able to have children (infertility).  · Weak bones (osteoporosis) and broken bones (fractures).  If you have coughing, wheezing, and shortness of breath, those symptoms may get better when you quit. You may also get sick less often. If you are pregnant, quitting smoking can help to lower your chances of having a baby of low birth weight.  What can I do to help me quit smoking?  Talk with your doctor about what can help you quit smoking. Some things you can do (strategies) include:  · Quitting smoking totally, instead of slowly cutting back how much you smoke over a period of time.  · Going to in-person counseling. You are more likely to quit if you go to many counseling sessions.  · Using resources and support systems, such as:  ¨ Online chats with a counselor.  ¨ Phone quitlines.  ¨ Printed self-help materials.  ¨ Support groups or group counseling.  ¨ Text messaging programs.  ¨ Mobile phone apps or applications.  · Taking medicines. Some of these medicines may have nicotine in them. If you are pregnant or breastfeeding, do not take any medicines to quit smoking unless your doctor says it is okay. Talk with your doctor about counseling or other things that can help you.  Talk with your doctor about using more than one strategy at the same time, such as taking medicines while you are also going to in-person counseling. This can help make quitting easier.  What  things can I do to make it easier to quit?  Quitting smoking might feel very hard at first, but there is a lot that you can do to make it easier. Take these steps:  · Talk to your family and friends. Ask them to support and encourage you.  · Call phone quitlines, reach out to support groups, or work with a counselor.  · Ask people who smoke to not smoke around you.  · Avoid places that make you want (trigger) to smoke, such as:  ¨ Bars.  ¨ Parties.  ¨ Smoke-break areas at work.  · Spend time with people who do not smoke.  · Lower the stress in your life. Stress can make you want to smoke. Try these things to help your stress:  ¨ Getting regular exercise.  ¨ Deep-breathing exercises.  ¨ Yoga.  ¨ Meditating.  ¨ Doing a body scan. To do this, close your eyes, focus on one area of your body at a time from head to toe, and notice which parts of your body are tense. Try to relax the muscles in those areas.  · Download or buy apps on your mobile phone or tablet that can help you stick to your quit plan. There are many free apps, such as QuitGuide from the CDC (Centers for Disease Control and Prevention). You can find more support from smokefree.gov and other websites.  This information is not intended to replace advice given to you by your health care provider. Make sure you discuss any questions you have with your health care provider.  Document Released: 10/14/2010 Document Revised: 08/15/2017 Document Reviewed: 05/03/2016  [employer] Interactive Patient Education © 2017 [employer].

## 2025-04-04 PROCEDURE — 88305 TISSUE EXAM BY PATHOLOGIST: CPT

## 2025-04-04 PROCEDURE — 88312 SPECIAL STAINS GROUP 1: CPT

## 2025-04-07 ENCOUNTER — TELEPHONE (OUTPATIENT)
Dept: OBGYN CLINIC | Age: 35
End: 2025-04-07

## 2025-04-07 NOTE — TELEPHONE ENCOUNTER
Pt called and was asking about the status of her nexplanon. I let her know that Jazmin sent her a mychart on the 1 st stating she will check the status this Friday again, she did not get to read the mychart message. I let her know to call or mychart if she had not heard from us by Friday afternoon, she said she would on Friday or Monday. She said she was in town and if it was here was wanting to come in and have it inserted. I did let her know that Nexplanon has been taking longer to get a determination of benefits. Carli/ALEXANDER

## 2025-04-08 ENCOUNTER — LAB REQUISITION (OUTPATIENT)
Dept: LAB | Facility: HOSPITAL | Age: 35
End: 2025-04-08
Payer: COMMERCIAL

## 2025-04-08 DIAGNOSIS — R21 RASH AND OTHER NONSPECIFIC SKIN ERUPTION: ICD-10-CM

## 2025-04-08 NOTE — TELEPHONE ENCOUNTER
Spoke with pt over the phone regarding nexplanon device. We received summary of benefits for the pt and it's Buy/Bill. I explained to the pt & that our office doesn't offer that here at the office. The nexplanon rep stated that she would rerun the benefits to try and get it covered. I let the pt know that if the summary of benefits still doesn't cover nexplanon device we would need to look at other birth control options. I will call the nexplanon company again on Friday to get a status update. Pt verbalized understanding.   RAINA Wu

## 2025-04-17 ENCOUNTER — PATIENT MESSAGE (OUTPATIENT)
Dept: OBGYN CLINIC | Age: 35
End: 2025-04-17

## 2025-04-21 LAB
CYTO UR: NORMAL
LAB AP CASE REPORT: NORMAL
LAB AP CLINICAL INFORMATION: NORMAL
Lab: NORMAL
PATH REPORT.FINAL DX SPEC: NORMAL
PATH REPORT.GROSS SPEC: NORMAL

## 2025-05-05 RX ORDER — LORATADINE 10 MG
1 TABLET ORAL DAILY
Qty: 100 TABLET | Refills: 0 | Status: SHIPPED | OUTPATIENT
Start: 2025-05-05

## 2025-05-08 ENCOUNTER — TELEPHONE (OUTPATIENT)
Dept: OBGYN CLINIC | Age: 35
End: 2025-05-08

## 2025-05-19 ENCOUNTER — PROCEDURE VISIT (OUTPATIENT)
Dept: OBGYN CLINIC | Age: 35
End: 2025-05-19
Payer: COMMERCIAL

## 2025-05-19 VITALS
SYSTOLIC BLOOD PRESSURE: 141 MMHG | DIASTOLIC BLOOD PRESSURE: 87 MMHG | HEIGHT: 68 IN | WEIGHT: 293 LBS | BODY MASS INDEX: 44.41 KG/M2 | HEART RATE: 85 BPM

## 2025-05-19 DIAGNOSIS — Z30.017 NEXPLANON INSERTION: Primary | ICD-10-CM

## 2025-05-19 DIAGNOSIS — Z01.812 PRE-PROCEDURE LAB EXAM: ICD-10-CM

## 2025-05-19 LAB
CONTROL: NORMAL
PREGNANCY TEST URINE, POC: NEGATIVE

## 2025-05-19 PROCEDURE — 11981 INSERTION DRUG DLVR IMPLANT: CPT | Performed by: OBSTETRICS & GYNECOLOGY

## 2025-05-19 PROCEDURE — 81025 URINE PREGNANCY TEST: CPT | Performed by: OBSTETRICS & GYNECOLOGY

## 2025-05-19 NOTE — PROGRESS NOTES
Pt is here today for a nexplanon insertion     Nexplanon - [facility] - 66756-825-63    Lot # - [id number]    Exp date - 6/30/2027          PRE-OP DIAGNOSIS:  Insertion of Nexplanon  POSTOP DIAGNOSIS: Same   PROCEDURE: informed consent was obtained.  UPT was negative.site was selected 8 cm from medial epicondyle and marked along with guiding site using sterile markerarea was prepped and draped in a sterile fashion.  3 mL of 1% lidocaine was used for subcutaneous anesthesia. Anesthesia confirmed. ® trocar was inserted subcutaneously and then Nexplanon ® capsule delivered subcutaneouslywas removed from the insertion site.® capsule was palpated by provider and patient to assure satisfactory placement.blood loss of <10 mLstrips applied and arm wrapped with dressing.: The patient tolerated the procedure well without complications. Standard post-procedure care is explained and return precautions are given.

## 2025-08-14 RX ORDER — LORATADINE 10 MG
1 TABLET ORAL DAILY
Qty: 100 TABLET | Refills: 0 | Status: SHIPPED | OUTPATIENT
Start: 2025-08-14

## (undated) DEVICE — ENDOGATOR AUXILIARY WATER JET CONNECTOR: Brand: ENDOGATOR

## (undated) DEVICE — FRCP BX RADJAW4 NDL 2.8 240 STD OG

## (undated) DEVICE — SUTURE VICRYL SZ 0 L36IN ABSRB VLT L36MM CT-1 1/2 CIR J346H

## (undated) DEVICE — GLOVE ORANGE PI 7 1/2   MSG9075

## (undated) DEVICE — Device

## (undated) DEVICE — THE CHANNEL CLEANING BRUSH IS A NYLON FLEXI BRUSH ATTACHED TO A FLEXIBLE PLASTIC SHEATH DESIGNED TO SAFELY REMOVE DEBRIS FROM FLEXIBLE ENDOSCOPES.

## (undated) DEVICE — 1LYRTR 16FR10ML 100%SILI SNAP: Brand: MEDLINE INDUSTRIES, INC.

## (undated) DEVICE — CONMED SCOPE SAVER BITE BLOCK, 20X27 MM: Brand: SCOPE SAVER

## (undated) DEVICE — STERILE POLYISOPRENE POWDER-FREE SURGICAL GLOVES: Brand: PROTEXIS

## (undated) DEVICE — BARRIER ADH W3XL4IN UTER PELV ABSRB GYNECARE INTCEED

## (undated) DEVICE — BINDER ABD H12IN COT FOR 45-62IN WAIST UNIV PREM 4 PNL DSGN

## (undated) DEVICE — Device: Brand: DEFENDO AIR/WATER/SUCTION AND BIOPSY VALVE

## (undated) DEVICE — AGENT HEMSTAT 3GM OXIDIZED REGENERATED CELOS ABSRB FOR CONT (ORDER MULTIPLES OF 5EA)

## (undated) DEVICE — MASK ROUND 60MM FPH (10) S/USE: Brand: FISHER & PAYKEL HEALTHCARE

## (undated) DEVICE — DRESSING NEG PRSS 13CM PREVENA

## (undated) DEVICE — GARMENT COMPR STD FOR 17IN CALF UNIF THER FLOTRN

## (undated) DEVICE — SUTURE VICRYL + SZ 0 L36IN ABSRB UD L48MM CTX 1/2 CIR VCP978H

## (undated) DEVICE — SENSR O2 OXIMAX FNGR A/ 18IN NONSTR

## (undated) DEVICE — AIR SHEET,LAT,COMFORT GLIDE, BLEND 40X80: Brand: MEDLINE

## (undated) DEVICE — TBG SMPL FLTR LINE NASL 02/C02 A/ BX/100

## (undated) DEVICE — TRAY EPI 25GA 3.5IN 0.75% BIPIVCAIN 8.25% D CONTAIN BPA

## (undated) DEVICE — SUTURE MONOCRYL SZ 3-0 L27IN ABSRB UD L60MM KS STR REV CUT Y523H

## (undated) DEVICE — SUTURE STRATAFIX SYMMETRIC PDS + SZ 1 L18IN ABSRB VLT OS-6 SXPP1A201

## (undated) DEVICE — SPONGE LAP W18XL18IN WHT COT 4 PLY FLD STRUNG RADPQ DISP ST 2 PER PACK

## (undated) DEVICE — SUTURE VICRYL + SZ 2-0 L36IN ABSRB UD L36MM CT-1 1/2 CIR VCP945H

## (undated) DEVICE — CUFF,BP,DISP,1 TUBE,ADULT,HP: Brand: MEDLINE